# Patient Record
Sex: MALE | Race: BLACK OR AFRICAN AMERICAN | Employment: OTHER | URBAN - METROPOLITAN AREA
[De-identification: names, ages, dates, MRNs, and addresses within clinical notes are randomized per-mention and may not be internally consistent; named-entity substitution may affect disease eponyms.]

---

## 2017-01-03 ENCOUNTER — ANESTHESIA EVENT (OUTPATIENT)
Dept: MEDSURG UNIT | Age: 77
End: 2017-01-03
Payer: MEDICARE

## 2017-01-03 ENCOUNTER — APPOINTMENT (OUTPATIENT)
Dept: CT IMAGING | Age: 77
End: 2017-01-03
Attending: INTERNAL MEDICINE
Payer: MEDICARE

## 2017-01-03 ENCOUNTER — ANESTHESIA (OUTPATIENT)
Dept: MEDSURG UNIT | Age: 77
End: 2017-01-03
Payer: MEDICARE

## 2017-01-03 ENCOUNTER — HOSPITAL ENCOUNTER (OUTPATIENT)
Dept: NON INVASIVE DIAGNOSTICS | Age: 77
Discharge: HOME | End: 2017-01-04
Attending: INTERNAL MEDICINE | Admitting: INTERNAL MEDICINE
Payer: MEDICARE

## 2017-01-03 PROBLEM — E11.9 DIABETES (HCC): Status: ACTIVE | Noted: 2017-01-03

## 2017-01-03 PROBLEM — I25.10 ASCVD (ARTERIOSCLEROTIC CARDIOVASCULAR DISEASE): Status: ACTIVE | Noted: 2017-01-03

## 2017-01-03 PROBLEM — R55 NEAR SYNCOPE: Status: ACTIVE | Noted: 2017-01-03

## 2017-01-03 PROBLEM — E78.5 HYPERLIPIDEMIA: Status: ACTIVE | Noted: 2017-01-03

## 2017-01-03 PROBLEM — I10 HYPERTENSION: Status: ACTIVE | Noted: 2017-01-03

## 2017-01-03 PROBLEM — M19.90 DJD (DEGENERATIVE JOINT DISEASE): Status: ACTIVE | Noted: 2017-01-03

## 2017-01-03 PROBLEM — I42.9 CARDIOMYOPATHY (HCC): Status: ACTIVE | Noted: 2017-01-03

## 2017-01-03 LAB
GLUCOSE BLD STRIP.AUTO-MCNC: 119 MG/DL (ref 65–100)
GLUCOSE BLD STRIP.AUTO-MCNC: 120 MG/DL (ref 65–100)
GLUCOSE BLD STRIP.AUTO-MCNC: 124 MG/DL (ref 65–100)
GLUCOSE BLD STRIP.AUTO-MCNC: 153 MG/DL (ref 65–100)
GLUCOSE BLD STRIP.AUTO-MCNC: 65 MG/DL (ref 65–100)
GLUCOSE BLD STRIP.AUTO-MCNC: 76 MG/DL (ref 65–100)
GLUCOSE BLD STRIP.AUTO-MCNC: 78 MG/DL (ref 65–100)
SERVICE CMNT-IMP: ABNORMAL
SERVICE CMNT-IMP: NORMAL

## 2017-01-03 PROCEDURE — 77030010507 HC ADH SKN DERMBND J&J -B

## 2017-01-03 PROCEDURE — 76060000032 HC ANESTHESIA 0.5 TO 1 HR

## 2017-01-03 PROCEDURE — 74011250636 HC RX REV CODE- 250/636: Performed by: INTERNAL MEDICINE

## 2017-01-03 PROCEDURE — 77030031139 HC SUT VCRL2 J&J -A

## 2017-01-03 PROCEDURE — 82962 GLUCOSE BLOOD TEST: CPT

## 2017-01-03 PROCEDURE — 77030011640 HC PAD GRND REM COVD -A

## 2017-01-03 PROCEDURE — 77030002996 HC SUT SLK J&J -A

## 2017-01-03 PROCEDURE — 33264 RMVL & RPLCMT DFB GEN MLT LD: CPT

## 2017-01-03 PROCEDURE — 74011250636 HC RX REV CODE- 250/636

## 2017-01-03 PROCEDURE — 77030037029 HC IMPL ENV ICD ANTIBACT ABSRB TYRX MEDT -G

## 2017-01-03 PROCEDURE — 74011250637 HC RX REV CODE- 250/637

## 2017-01-03 PROCEDURE — 70450 CT HEAD/BRAIN W/O DYE: CPT

## 2017-01-03 PROCEDURE — L3670 SO ACRO/CLAV CAN WEB PRE OTS: HCPCS

## 2017-01-03 PROCEDURE — 74011000250 HC RX REV CODE- 250

## 2017-01-03 PROCEDURE — 74011000250 HC RX REV CODE- 250: Performed by: INTERNAL MEDICINE

## 2017-01-03 PROCEDURE — 74011000258 HC RX REV CODE- 258: Performed by: INTERNAL MEDICINE

## 2017-01-03 PROCEDURE — 77030018729 HC ELECTRD DEFIB PAD CARD -B

## 2017-01-03 PROCEDURE — 77010033678 HC OXYGEN DAILY

## 2017-01-03 PROCEDURE — C1882 AICD, OTHER THAN SING/DUAL: HCPCS

## 2017-01-03 PROCEDURE — 77030028698 HC BLD TISS PLSM MEDT -D

## 2017-01-03 PROCEDURE — 77030018836 HC SOL IRR NACL ICUM -A

## 2017-01-03 RX ORDER — DEXTROSE 50 % IN WATER (D50W) INTRAVENOUS SYRINGE
Status: DISPENSED
Start: 2017-01-03 | End: 2017-01-03

## 2017-01-03 RX ORDER — ONDANSETRON 2 MG/ML
4 INJECTION INTRAMUSCULAR; INTRAVENOUS
Status: DISCONTINUED | OUTPATIENT
Start: 2017-01-03 | End: 2017-01-04 | Stop reason: HOSPADM

## 2017-01-03 RX ORDER — FENTANYL CITRATE 50 UG/ML
12.5-5 INJECTION, SOLUTION INTRAMUSCULAR; INTRAVENOUS
Status: DISCONTINUED | OUTPATIENT
Start: 2017-01-03 | End: 2017-01-03 | Stop reason: ALTCHOICE

## 2017-01-03 RX ORDER — GUAIFENESIN 100 MG/5ML
81 LIQUID (ML) ORAL DAILY
Status: DISCONTINUED | OUTPATIENT
Start: 2017-01-04 | End: 2017-01-04 | Stop reason: HOSPADM

## 2017-01-03 RX ORDER — PROPOFOL 10 MG/ML
INJECTION, EMULSION INTRAVENOUS AS NEEDED
Status: DISCONTINUED | OUTPATIENT
Start: 2017-01-03 | End: 2017-01-03 | Stop reason: HOSPADM

## 2017-01-03 RX ORDER — DEXTROSE 50 % IN WATER (D50W) INTRAVENOUS SYRINGE
25 AS NEEDED
Status: DISCONTINUED | OUTPATIENT
Start: 2017-01-03 | End: 2017-01-04 | Stop reason: HOSPADM

## 2017-01-03 RX ORDER — BACITRACIN 50000 [IU]/1
INJECTION, POWDER, FOR SOLUTION INTRAMUSCULAR
Status: COMPLETED
Start: 2017-01-03 | End: 2017-01-03

## 2017-01-03 RX ORDER — HEPARIN SODIUM 200 [USP'U]/100ML
500 INJECTION, SOLUTION INTRAVENOUS ONCE
Status: COMPLETED | OUTPATIENT
Start: 2017-01-03 | End: 2017-01-03

## 2017-01-03 RX ORDER — BACITRACIN 50000 [IU]/1
50000 INJECTION, POWDER, FOR SOLUTION INTRAMUSCULAR ONCE
Status: COMPLETED | OUTPATIENT
Start: 2017-01-03 | End: 2017-01-03

## 2017-01-03 RX ORDER — PROPOFOL 10 MG/ML
INJECTION, EMULSION INTRAVENOUS
Status: DISCONTINUED | OUTPATIENT
Start: 2017-01-03 | End: 2017-01-03 | Stop reason: HOSPADM

## 2017-01-03 RX ORDER — SODIUM CHLORIDE 0.9 % (FLUSH) 0.9 %
5-10 SYRINGE (ML) INJECTION AS NEEDED
Status: CANCELLED | OUTPATIENT
Start: 2017-01-03

## 2017-01-03 RX ORDER — MAGNESIUM SULFATE 100 %
4 CRYSTALS MISCELLANEOUS AS NEEDED
Status: DISCONTINUED | OUTPATIENT
Start: 2017-01-03 | End: 2017-01-04 | Stop reason: HOSPADM

## 2017-01-03 RX ORDER — ACETAMINOPHEN 325 MG/1
650 TABLET ORAL
Status: DISCONTINUED | OUTPATIENT
Start: 2017-01-03 | End: 2017-01-04 | Stop reason: HOSPADM

## 2017-01-03 RX ORDER — CEFAZOLIN SODIUM 1 G/3ML
INJECTION, POWDER, FOR SOLUTION INTRAMUSCULAR; INTRAVENOUS
Status: DISCONTINUED
Start: 2017-01-03 | End: 2017-01-03 | Stop reason: ALTCHOICE

## 2017-01-03 RX ORDER — INSULIN LISPRO 100 [IU]/ML
INJECTION, SOLUTION INTRAVENOUS; SUBCUTANEOUS
Status: DISCONTINUED | OUTPATIENT
Start: 2017-01-03 | End: 2017-01-04 | Stop reason: HOSPADM

## 2017-01-03 RX ORDER — LIDOCAINE HYDROCHLORIDE AND EPINEPHRINE 10; 10 MG/ML; UG/ML
1-20 INJECTION, SOLUTION INFILTRATION; PERINEURAL
Status: DISCONTINUED | OUTPATIENT
Start: 2017-01-03 | End: 2017-01-03 | Stop reason: ALTCHOICE

## 2017-01-03 RX ORDER — SODIUM CHLORIDE 0.9 % (FLUSH) 0.9 %
5-10 SYRINGE (ML) INJECTION EVERY 8 HOURS
Status: CANCELLED | OUTPATIENT
Start: 2017-01-03

## 2017-01-03 RX ORDER — SODIUM CHLORIDE 9 MG/ML
100 INJECTION, SOLUTION INTRAVENOUS CONTINUOUS
Status: DISCONTINUED | OUTPATIENT
Start: 2017-01-03 | End: 2017-01-04 | Stop reason: HOSPADM

## 2017-01-03 RX ORDER — DEXTROSE 50 % IN WATER (D50W) INTRAVENOUS SYRINGE
12.5-25 AS NEEDED
Status: DISCONTINUED | OUTPATIENT
Start: 2017-01-03 | End: 2017-01-04 | Stop reason: HOSPADM

## 2017-01-03 RX ORDER — MIDAZOLAM HYDROCHLORIDE 1 MG/ML
1-5 INJECTION, SOLUTION INTRAMUSCULAR; INTRAVENOUS
Status: DISCONTINUED | OUTPATIENT
Start: 2017-01-03 | End: 2017-01-03 | Stop reason: ALTCHOICE

## 2017-01-03 RX ORDER — ACETAMINOPHEN 325 MG/1
650 TABLET ORAL
Status: CANCELLED | OUTPATIENT
Start: 2017-01-03

## 2017-01-03 RX ORDER — GUAIFENESIN 100 MG/5ML
LIQUID (ML) ORAL
Status: COMPLETED
Start: 2017-01-03 | End: 2017-01-03

## 2017-01-03 RX ORDER — CEPHALEXIN 500 MG/1
500 CAPSULE ORAL 3 TIMES DAILY
Qty: 15 CAP | Refills: 0 | Status: SHIPPED | OUTPATIENT
Start: 2017-01-03 | End: 2017-01-08

## 2017-01-03 RX ADMIN — PROPOFOL 50 MCG/KG/MIN: 10 INJECTION, EMULSION INTRAVENOUS at 11:56

## 2017-01-03 RX ADMIN — DEXTROSE MONOHYDRATE 12.5 G: 25 INJECTION, SOLUTION INTRAVENOUS at 10:09

## 2017-01-03 RX ADMIN — ASPIRIN 81 MG 81 MG: 81 TABLET ORAL at 17:47

## 2017-01-03 RX ADMIN — HEPARIN SODIUM 1000 UNITS: 200 INJECTION, SOLUTION INTRAVENOUS at 12:14

## 2017-01-03 RX ADMIN — PROPOFOL 30 MG: 10 INJECTION, EMULSION INTRAVENOUS at 12:34

## 2017-01-03 RX ADMIN — CEFAZOLIN SODIUM 2 G: 1 INJECTION, POWDER, FOR SOLUTION INTRAMUSCULAR; INTRAVENOUS at 12:12

## 2017-01-03 RX ADMIN — BACITRACIN 50000 UNITS: 5000 INJECTION, POWDER, FOR SOLUTION INTRAMUSCULAR at 12:35

## 2017-01-03 RX ADMIN — SODIUM CHLORIDE 250 ML: 900 INJECTION, SOLUTION INTRAVENOUS at 17:50

## 2017-01-03 RX ADMIN — BACITRACIN 50000 UNITS: 50000 INJECTION, POWDER, FOR SOLUTION INTRAMUSCULAR at 12:35

## 2017-01-03 RX ADMIN — SODIUM CHLORIDE 100 ML/HR: 900 INJECTION, SOLUTION INTRAVENOUS at 10:10

## 2017-01-03 RX ADMIN — LIDOCAINE HYDROCHLORIDE AND EPINEPHRINE 20 ML: 10; 10 INJECTION, SOLUTION INFILTRATION; PERINEURAL at 12:33

## 2017-01-03 RX ADMIN — PROPOFOL 20 MG: 10 INJECTION, EMULSION INTRAVENOUS at 12:28

## 2017-01-03 NOTE — PROGRESS NOTES
Spiritual Care Assessment/Progress Notes    Roman Peña [id number]  [id number]    1940  67 y.o.  male    Patient Telephone Number: [phone] (home)   Mormon Affiliation: [facility]   Language: English   Extended Emergency Contact Information  Primary Emergency Contact: Georgina Rodriguez  Address: [address], [address] Phone: [phone]  Mobile Phone: [phone]  Relation: Spouse   There are no active problems to display for this patient. Date: 1/3/2017       Level of Mormon/Spiritual Activity:  []         Involved in tammy tradition/spiritual practice    []         Not involved in tammy tradition/spiritual practice  []         Spiritually oriented    []         Claims no spiritual orientation    []         seeking spiritual identity  []         Feels alienated from Sikhism practice/tradition  []         Feels angry about Sikhism practice/tradition  []         Spirituality/Sikhism tradition a resource for coping at this time.   [x]         Not able to assess due to medical condition    Services Provided Today:  []         crisis intervention    []         reading Scriptures  []         spiritual assessment    []         prayer  []         empathic listening/emotional support  []         rites and rituals (cite in comments)  []         life review     []         Sikhism support  []         theological development   []         advocacy  []         ethical dialog     []         blessing  []         bereavement support    []         support to family  []         anticipatory grief support   []         help with AMD  []         spiritual guidance    []         meditation      Spiritual Care Needs  [x]         Emotional Support  []         Spiritual/Mormon Care  []         Loss/Adjustment  []         Advocacy/Referral                /Ethics  []         No needs expressed at               this time  []         Other: (note in comments)  Spiritual Care Plan  []         Follow up visits with               pt/family  []         Provide materials  []         Schedule sacraments  []         Contact Community               Clergy  [x]         Follow up as needed  []         Other: (note in               comments)     Comments:  Provided emotional support to spouse and family during and after code. Family was self supporting when  left unit. Marlo Lindsey M.S., M.Div.   [address] ([id number])

## 2017-01-03 NOTE — PROGRESS NOTES
Responding to RAT call Code S secondary to staff reports preparing for discharge when pt. C/o nausea with drooling. Upon arrival pt. Awake and oriented. Answers questions appropriately and following commands some slight drift of left lower extremity. Dr. Malick Goff at bedside. Dr. Valente Merino notified by staff via phone (See MD orders). Very slight dysarthria noted. FSBS 124. Tele-Neuro not initiated at this time per Dr. Malick Goff. NIH 3. Pt. deines headache or discomfort, but reports similar episode in past.  CT pending. Josey 37 CT results noted.

## 2017-01-03 NOTE — PROCEDURES
[address]  [phone]    Patient ID:  Patient: Morgan Hill  MRN: [id number]  Age: 67 y.o.  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 He is pacemaker-dependent and it would be catastrophic if his battery were to deplete unexpectedly. Procedures Performed:  1. Replace/Upgrade Existing ICD pulse generator (03634)  2. ICD testing, initial (05590-44)    The patient was brought to the EP lab in a postabsorptive state after informed consent had been previously obtained. Continuous electrocardiographic and hemodynamic monitoring was performed. Sedation was performed by the anesthesiologist who was in constant attendance throughout the procedure and by the anesthesiologist during ICD testing. Using 1% lidocaine with epinephrine, the left chest site was anesthetized. Using blunt dissection and electrocautery, the pocket was dissected and ultimately the old BIV-ICD generator delivered from the pocket. After the leads were disconnected from the generator, they were tested and performance verified. The new pulse generator was connected to the leads and placed in the pocket after hemostasis was confirmed. A Tyrx absorbable antibiotic envelope was used. Vigorous irrigation with antibiotic solution was performed. The pocket was closed using a running 2-0 Vicryl layer x1, followed by a more superficial layer of running 4-0 Vicryl in a subcuticular fashion to close the skin. Dermabond was applied. Defibrillation testing was performed before pocket closure.   Using an induction protocol, ventricular fibrillation was successfully sensed and a single 20J biphasic shock restored sinus rhythm, 63 ohm shock impedance. No complications. Preoperative Diagnosis: As above. Postoperative Diagnosis: As above. Procedure:  As above. Surgeon(s) and Role:  Hector Hernandez MD - Primary   Anesthesia:   MAC by the anesthesiologist  Estimated Blood Loss:  <5 cc. Specimens: * No specimens in log *   Findings:  As below. Complications:  None. Case time:  18 minutes    SETTINGS for new generator:  SJM 3369-40Q, [id number]  RA 1.6, 1.0, 440  RV -, 0.625, 490  LV -, 1.125, 680  DDD     Explanted device:  SJM 3365-40Q, [id number], implanted 10/6/2015    Recommendations:  After successful BIV-ICD generator change at the left chest, routine follow-up in 2-4 weeks for wound and device check.

## 2017-01-03 NOTE — ANESTHESIA POSTPROCEDURE EVALUATION
Post-Anesthesia Evaluation and Assessment    Patient: Roman Peña MRN: [id number]  SSN: [id number]    YOB: 1940  Age: 67 y.o. Sex: male       Cardiovascular Function/Vital Signs  Visit Vitals    /65    Pulse 60    Temp 36.2 °C (97.1 °F)    Resp 18    Ht 4' 11.5\" (1.511 m)    Wt 52.2 kg (115 lb)    SpO2 98%    BMI 22.84 kg/m2       Patient is status post general anesthesia for * No procedures listed *. Nausea/Vomiting: None    Postoperative hydration reviewed and adequate. Pain:  Pain Scale 1: Numeric (0 - 10) (01/03/17 1306)  Pain Intensity 1: 0 (01/03/17 1306)   Managed    Neurological Status: At baseline    Mental Status and Level of Consciousness: Arousable    Pulmonary Status:   O2 Device: Nasal cannula (01/03/17 1306)   Adequate oxygenation and airway patent    Complications related to anesthesia: None    Post-anesthesia assessment completed.  No concerns    Signed By: Ron Mac MD     January 3, 2017

## 2017-01-03 NOTE — ANESTHESIA POSTPROCEDURE EVALUATION
Post-Anesthesia Evaluation and Assessment    Patient: Contreras Paz MRN: [id number]  SSN: [id number]    YOB: 1940  Age: 67 y.o. Sex: male       Cardiovascular Function/Vital Signs  Visit Vitals    BP (!) 108/91    Pulse 60    Temp 36.2 °C (97.1 °F)    Resp 14    Ht 4' 11.5\" (1.511 m)    Wt 52.2 kg (115 lb)    SpO2 100%    BMI 22.84 kg/m2       Patient is status post general anesthesia for * No procedures listed *. Nausea/Vomiting: None    Postoperative hydration reviewed and adequate. Pain:  Pain Scale 1: Numeric (0 - 10) (01/03/17 1306)  Pain Intensity 1: 0 (01/03/17 1306)   Managed    Neurological Status: At baseline    Mental Status and Level of Consciousness: Arousable    Pulmonary Status:   O2 Device: Nasal cannula (01/03/17 1306)   Adequate oxygenation and airway patent    Complications related to anesthesia: None    Post-anesthesia assessment completed.  No concerns    Signed By: Olivia Hernandez MD     January 3, 2017

## 2017-01-03 NOTE — ANESTHESIA PREPROCEDURE EVALUATION
Anesthetic History   No history of anesthetic complications            Review of Systems / Medical History  Patient summary reviewed, nursing notes reviewed and pertinent labs reviewed    Pulmonary  Within defined limits                 Neuro/Psych   Within defined limits           Cardiovascular    Hypertension        Dysrhythmias   Pacemaker, CAD and CABG    Exercise tolerance: >4 METS     GI/Hepatic/Renal     GERD           Endo/Other    Diabetes    Arthritis     Other Findings        Anesthetic History   No history of anesthetic complications            Review of Systems / Medical History  Patient summary reviewed, nursing notes reviewed and pertinent labs reviewed    Pulmonary  Within defined limits                 Neuro/Psych   Within defined limits           Cardiovascular    Hypertension      CHF  Dysrhythmias   Pacemaker, CAD, CABG (CABG in 1999) and hyperlipidemia    Exercise tolerance: >4 METS     GI/Hepatic/Renal     GERD           Endo/Other    Diabetes    Arthritis     Other Findings              Physical Exam    Airway  Mallampati: II  TM Distance: 4 - 6 cm  Neck ROM: normal range of motion   Mouth opening: Normal     Cardiovascular  Regular rate and rhythm,  S1 and S2 normal,  no murmur, click, rub, or gallop             Dental    Dentition: Lower partial plate     Pulmonary  Breath sounds clear to auscultation               Abdominal  GI exam deferred       Other Findings            Anesthetic Plan    ASA: 3  Anesthesia type: total IV anesthesia and general          Induction: Intravenous  Anesthetic plan and risks discussed with: Patient                Physical Exam    Airway  Mallampati: II  TM Distance: 4 - 6 cm  Neck ROM: normal range of motion   Mouth opening: Normal     Cardiovascular  Regular rate and rhythm,  S1 and S2 normal,  no murmur, click, rub, or gallop             Dental    Dentition: Lower partial plate     Pulmonary  Breath sounds clear to auscultation               Abdominal  GI exam deferred       Other Findings            Anesthetic Plan    ASA: 3  Anesthesia type: general    Monitoring Plan: BIS      Induction: Intravenous  Anesthetic plan and risks discussed with: Patient

## 2017-01-03 NOTE — DISCHARGE INSTRUCTIONS
DISCHARGE INSTRUCTIONS FOR PATIENTS WITH ICD'S    You had a prophylactic [facility] BIV-ICD generator change 1/2/2016 due to a medical advisory for battery. 1. Remember to call for an appointment in 2-4 weeks [phone] to check healing and implant programming with Dr. Jose Brooks nurse, Mikel Trujillo. 2. Medic Alert Bracelets are available from your pharmacist to wear at all times if you choose to wear one. 3. Carry your ID card for your ICD with you at all times. This card will be given to you in the hospital or mailed to you. 4. The ICD will bulge slightly under your skin. The bulge will decrease in size over the next few weeks. Please notify the doctor's office if you notice any of the following around your ICD site:   A.  A bruise that does not go away. B.  Soreness or yellow, green, or brown drainage from the site. C. Any swelling from the site. D. If you have a fever of 100 degrees or higher that lasts for a few days. INCISION CARE       1.  Leave the skin glue over your site until it dissolves on its own, usually in a few weeks. 2.  You may shower after 3 days as long as your incision isnt submerged or directly sprayed upon until well healed. 3.  For comfort, wear loose fitting clothing. 4.  Report any signs of infection, fever, pain, swelling, redness, oozing, or heat at site especially if these symptoms increase after the first 3 to 4 days. ACTIVITY PRECAUTIONS     1. Avoid rough contact with the implant site. 2. No driving for 5 days. 3. Avoid lifting your arm over your head, carrying anything on the affected side, or lifting over 10 pounds for 30 days. For the first 2 days only bend your arm at the elbow. 4. Any extreme activity such as golf, weight lifting or exercise biking should be restricted for 60 days. 5. Do not carry objects by holding them against your implant site. 6.  No shooting rifles or any type of gun with the affected shoulder permanently.   7.  Welding and chainsaws are prohibited. SPECIAL PRECAUTIONS     1. You should avoid all strong magnetic fields, such as arc welding, large transformers, large motors. Some ICD devices will beep if it detects a strong magnet. If this occurs, move out of the area. 2.  You may not have an MRI which uses a strong magnet to take pictures. 3.  Treatments or surgery that requires diathermy or electrocautery should be discussed with your doctor before scheduled. 4.  Avoid radio frequency transmitters, including radar. 5.  Advise dentist or other medical personnel you see that you have an ICD. 6.  Cell phones and microwave oven use is okay. 7.  If you plan to move or take a trip to a new area, the doctor's office will give you a name of a doctor to contact for any problems. SPECIAL INSTRUCTIONS ON SHOCKS     1. Notify your doctor for any of the following:       A. Anytime a shock is received in a 24 hour period. An office visit is not usually required for a single shock. B.  Two or more shocks in a row. If you do not feel well, call the [facility], otherwise call your doctor. This may require an office visit. C. Two or more shocks spaced apart by several hours. This may require an office visit. 2.  Keep a record of events. Include date, time, symptoms and activity at that time. ANTIBIOTIC THERAPY    During the first 8 weeks after your ICD insertion, you may need antibiotics before any dental work or certain tests or operations. Let the dentist or doctor who is caring for you know that you have had an implanted device. You will receive a prescription for a prophylactic antibiotic called cephalexin for a few days after your implant. This was called in to your pharmacy.

## 2017-01-03 NOTE — PROGRESS NOTES
Patient ambulated in hallway without difficulty. Dressing CDI. No complaints. Discharge instructions reviewed with patient; to be discharged to home with wife. Site care instructions reviewed; site(s) CDI. Patient instructed on which medications to continue and  which to start. Prescriptions sent to patient's pharmacy. Medication info provided and reviewed with patient. Follow-up appointment information given. IV and tele box removed. Opportunity for questions provided; all questions answered. All belongings returned. Patient wheeled to front door via wheelchair by nurse; to be transported home by wife.

## 2017-01-03 NOTE — PROGRESS NOTES
1737: Pt. Started feeling nauseous, feeling lethargic, and drooling, with slight left sided facial droop, and general decrease in LOC from baseline.  in hands, and strength in feet and legs are equal. Can follow pen light in all directions. Vitals stable. Pt. Was ready for discharge with tele and IV removed. Upon seeing these symptoms tele and IV access where obtained again. Aspirin 81mg and 250 NS bolus given. Head CT ordered and transported down to CT with nurse and lifepack. 1845: returned from [address]. Vitals stable. Pt. Level of consciousness back to baseline       Bedside and Verbal shift change report given to Stefanie Coronado (oncoming nurse) by Demetrius Redmond (offgoing nurse). Report included the following information SBAR, Kardex, Intake/Output, MAR, Accordion and Recent Results.

## 2017-01-03 NOTE — PROGRESS NOTES
Hospitalist Consultation Note    NAME:  Belinda Vallejo   :   1940   MRN:   [id number]     ATTENDING: Albino Kendrick MD  PCP:  Walker Pollard MD    Date/Time:  1/3/2017 5:56 PM      Recommendations/Plan:     Responded to Code S  CVA vs TIA vs Syncope: BP in the low side, no gross focal deficits at this time, will give a small bolus of IVF,  Give ASA, check CT head, get Neurology evaluation on AM.  Hypoglycemia: noted on AM labs, will place on SSI to have hypoglycemia protocol available. Check HbA1C.  HTN: BP is in the low side, monitor. CHF: EF 20%, not in acute failure at this time, monitor. Cardiology in the case. CAD: no chest pain at this time. S/p AICD generator change: under Cardiology care. Subjective:   REQUESTING PHYSICIAN:  REASON FOR CONSULT:      Geovani Grant is a 67 y.o.  male who I was asked to see for Code S. Patient with PMH of CHF, HTN, CAD, admitted for ICD generator change. When patient was ready for D/C patient had episode of drooling and confusion, that recovered in a couple of minutes. At this time patient denies chest pain, no SOB, no N/V no diarrhea, no fever, no urinary symptoms, no other associated symptoms.             Past Medical History   Diagnosis Date    Arrhythmia     Arthritis     CAD (coronary artery disease)     Diabetes ([address]      diet controlled    GERD (gastroesophageal reflux disease)     Hypertension     Kidney stone       Past Surgical History   Procedure Laterality Date    Hx other surgical       artificial testicle    Pr colonoscopy flx dx w/collj spec when pfrmd  2011          Pr cardiac surg procedure unlist       cabg x4 vessels     Hx heart catheterization      Hx pacemaker  10/6/15    Hx gi       COLONOSCOPY    Colorectal scrn; hi risk ind  2016          Colonoscopy N/A 2016     COLONOSCOPY performed by [facility]ailin Lara [facility] at [facility] ENDOSCOPY    Upper gi endoscopy,biopsy 12/14/2016           Social History   Substance Use Topics    Smoking status: Never Smoker    Smokeless tobacco: Never Used    Alcohol use No      Family History   Problem Relation Age of Onset    Heart Disease Mother     No Known Problems Father     Cancer Sister     Diabetes Brother     Anesth Problems Neg Hx        Allergies   Allergen Reactions    Caffeine Unknown (comments)     Sweating AND WOOZY AND CAN'T SLEEP    Codeine Other (comments)     Lightheaded, WOOZY AND CAN'T SLEEP    Percocet [Oxycodone-Acetaminophen] Nausea and Vomiting      Prior to Admission medications    Medication Sig Start Date End Date Taking? Authorizing Provider   cephALEXin (KEFLEX) 500 mg capsule Take 1 Cap by mouth three (3) times daily for 5 days. 1/3/17 1/8/17 Yes Artemio Meyers MD   ramipril (ALTACE) 5 mg capsule Take 20 mg by mouth daily. Yes Historical Provider   tadalafil (CIALIS) 20 mg tablet Take 20 mg by mouth as needed. Yes Historical Provider   OTHER Take 7 Tabs by mouth daily. [address]   Yes Historical Provider   metoprolol succinate (TOPROL XL) 25 mg XL tablet Take 1 Tab by mouth daily. 10/6/15  Yes Artemio Meyers MD   aspirin 81 mg tablet Take 81 mg by mouth daily. 4/7/11  Yes Historical Provider   albuterol sulfate 90 mcg/actuation aepb Take 2 Puffs by inhalation four (4) times daily as needed for Cough (SOB, wheezing). 3/10/16   Yudy Gomez NP       REVIEW OF SYSTEMS:     Total of 12 systems reviewed as follows:   I am not able to complete the review of systems because:    The patient is intubated and sedated    The patient has altered mental status due to his acute medical problems    The patient has baseline aphasia from prior stroke(s)    The patient has baseline dementia and is not reliable historian                 POSITIVE= underlined text  Negative = text not underlined  General:  fever, chills, sweats, generalized weakness, weight loss/gain,      loss of appetite Eyes:    blurred vision, eye pain, loss of vision, double vision  ENT:    rhinorrhea, pharyngitis   Respiratory:   cough, sputum production, SOB, wheezing, GUERIN, pleuritic pain   Cardiology:   chest pain, palpitations, orthopnea, PND, edema, syncope   Gastrointestinal:  abdominal pain , N/V, dysphagia, diarrhea, constipation, bleeding   Genitourinary:  frequency, urgency, dysuria, hematuria, incontinence   Muskuloskeletal :  arthralgia, myalgia   Hematology:  easy bruising, nose or gum bleeding, lymphadenopathy   Dermatological: rash, ulceration, pruritis   Endocrine:   hot flashes or polydipsia   Neurological:  headache, dizziness, confusion, focal weakness, paresthesia,     Speech difficulties, memory loss, gait disturbance  Psychological: Feelings of anxiety, depression, agitation    Objective:   VITALS:    Visit Vitals    /72    Pulse 64    Temp 97.1 °F (36.2 °C)    Resp 16    Ht 4' 11.5\" (1.511 m)    Wt 52.2 kg (115 lb)    SpO2 94%    BMI 22.84 kg/m2     Temp (24hrs), Av.3 °F (36.3 °C), Min:97.1 °F (36.2 °C), Max:97.5 °F (36.4 °C)      PHYSICAL EXAM:   General:    Alert, cooperative, no distress, appears stated age. HEENT: Atraumatic, anicteric sclerae, pink conjunctivae     No oral ulcers, mucosa moist, throat clear  Neck:  Supple, symmetrical,  thyroid: non tender  Lungs:   Coarse BS  Chest wall:  No tenderness  No Accessory muscle use. Heart:   Regular  rhythm,  No  murmur   No edema  Abdomen:   Soft, non-tender. Not distended. Bowel sounds normal  Extremities: No cyanosis. No clubbing  Skin:     Not pale. Not Jaundiced  No rashes   Psych:  Good insight. Not depressed. Not anxious or agitated.   Neurologic: Awake, oriented x3, GCS 15, no gross focal deficits    _______________________________________________________________________  Care Plan discussed with:    Comments   Patient y    Family  y    RN y    Care Manager                    Consultant: ____________________________________________________________________  TOTAL TIME:    mins    Comments     Reviewed previous records   >50% of visit spent in counseling and coordination of care y Discussion with patient and/or family and questions answered       Critical Care Provided 39   Minutes non procedure based  ________________________________________________________________________  Signed: Kira Dasilva MD      Procedures: see electronic medical records for all procedures/Xrays and details which were not copied into this note but were reviewed prior to creation of Plan.     LAB DATA REVIEWED:    Recent Results (from the past 24 hour(s))   GLUCOSE, POC    Collection Time: 01/03/17 10:03 AM   Result Value Ref Range    Glucose (POC) 78 65 - 100 mg/dL    Performed by Ebluis Ivonne    GLUCOSE, POC    Collection Time: 01/03/17 10:23 AM   Result Value Ref Range    Glucose (POC) 153 (H) 65 - 100 mg/dL    Performed by Herbert Parkers    GLUCOSE, POC    Collection Time: 01/03/17  1:20 PM   Result Value Ref Range    Glucose (POC) 65 65 - 100 mg/dL    Performed by [address], POC    Collection Time: 01/03/17  1:46 PM   Result Value Ref Range    Glucose (POC) 76 65 - 100 mg/dL    Performed by [address], POC    Collection Time: 01/03/17  2:05 PM   Result Value Ref Range    Glucose (POC) 119 (H) 65 - 100 mg/dL    Performed by Gale Rosario, POC    Collection Time: 01/03/17  5:31 PM   Result Value Ref Range    Glucose (POC) 124 (H) 65 - 100 mg/dL    Performed by Ruma Flynn        _____________________________  Hospitalist: Kira Dasilva MD

## 2017-01-03 NOTE — IP AVS SNAPSHOT
[address] [facility] 
[phone] Patient: Patrick Vega MRN: [id number] CPV:7/5/0445 You are allergic to the following Allergen Reactions Caffeine Unknown (comments) Sweating AND WOOZY AND CAN'T SLEEP Codeine Other (comments) Lightheaded, WOOZY AND CAN'T SLEEP Percocet (Oxycodone-Acetaminophen) Nausea and Vomiting Recent Documentation Height Weight BMI Smoking Status 1.511 m 52.2 kg 22.84 kg/m2 Never Smoker Emergency Contacts Name Discharge Info Relation Home Work Mobile Georgina Rodriguez DISCHARGE CAREGIVER [3] Spouse [3] [phone] About your hospitalization You were admitted on:  January 3, 2017 You last received care in the:  [facility] You were discharged on:  January 3, 2017 Unit phone number:  [phone] Why you were hospitalized Your primary diagnosis was:  Not on File Providers Seen During Your Hospitalizations Provider Role Specialty Primary office phone Duong Javier MD Attending Provider Cardiology [phone] Your Primary Care Physician (PCP) Primary Care Physician Office Phone Office Fax Sonia Dawson [phone] Follow-up Information Follow up With Details Comments Contact Info Doung Javier MD Schedule an appointment as soon as possible for a visit in 1 month with Dr. Jessica Hwang nurse, Alon Gillespie [address] [facility] 
[phone] Current Discharge Medication List  
  
START taking these medications Dose & Instructions Dispensing Information Comments Morning Noon Evening Bedtime  
 cephALEXin 500 mg capsule Commonly known as:  Merdis Perone Your next dose is: Today, Tomorrow Other:  _________  Dose:  500 mg  
 Take 1 Cap by mouth three (3) times daily for 5 days. Quantity:  15 Cap Refills:  0 CONTINUE these medications which have NOT CHANGED Dose & Instructions Dispensing Information Comments Morning Noon Evening Bedtime  
 albuterol sulfate 90 mcg/actuation Aepb Your next dose is: Today, Tomorrow Other:  _________ Dose:  2 Puff Take 2 Puffs by inhalation four (4) times daily as needed for Cough (SOB, wheezing). Quantity:  1 Inhaler Refills:  0  
     
   
   
   
  
 aspirin 81 mg tablet Your next dose is: Today, Tomorrow Other:  _________ Dose:  81 mg Take 81 mg by mouth daily. Refills:  0 CIALIS 20 mg tablet Generic drug:  tadalafil Your next dose is: Today, Tomorrow Other:  _________ Dose:  20 mg Take 20 mg by mouth as needed. Refills:  0  
     
   
   
   
  
 metoprolol succinate 25 mg XL tablet Commonly known as:  TOPROL XL Your next dose is: Today, Tomorrow Other:  _________ Dose:  25 mg Take 1 Tab by mouth daily. Quantity:  30 Tab Refills:  11  
     
   
   
   
  
 OTHER Your next dose is: Today, Tomorrow Other:  _________ Dose:  7 Tab Take 7 Tabs by mouth daily. [address] Refills:  0  
     
   
   
   
  
 ramipril 5 mg capsule Commonly known as:  ALTACE Your next dose is: Today, Tomorrow Other:  _________ Dose:  20 mg Take 20 mg by mouth daily. Refills:  0 Where to Get Your Medications Information on where to get these meds will be given to you by the nurse or doctor. ! Ask your nurse or doctor about these medications  
  cephALEXin 500 mg capsule Discharge Instructions DISCHARGE INSTRUCTIONS FOR PATIENTS WITH ICD'S 
 
 You had a prophylactic [employer] BIV-ICD generator change 1/2/2016 due to a medical advisory for battery. 1. Remember to call for an appointment in 2-4 weeks [phone] to check healing and implant programming with Dr. Bárbara Mills nurse, Ananya Underwood. 2. Medic Alert Bracelets are available from your pharmacist to wear at all times if you choose to wear one. 3. Carry your ID card for your ICD with you at all times. This card will be given to you in the hospital or mailed to you. 4. The ICD will bulge slightly under your skin. The bulge will decrease in size over the next few weeks. Please notify the doctor's office if you notice any of the following around your ICD site: A.  A bruise that does not go away. B.  Soreness or yellow, green, or brown drainage from the site. C. Any swelling from the site. D. If you have a fever of 100 degrees or higher that lasts for a few days. INCISION CARE 1.  Leave the skin glue over your site until it dissolves on its own, usually in a few weeks. 2.  You may shower after 3 days as long as your incision isnt submerged or directly sprayed upon until well healed. 3.  For comfort, wear loose fitting clothing. 4.  Report any signs of infection, fever, pain, swelling, redness, oozing, or heat at site especially if these symptoms increase after the first 3 to 4 days. ACTIVITY PRECAUTIONS 1. Avoid rough contact with the implant site. 2. No driving for 5 days. 3. Avoid lifting your arm over your head, carrying anything on the affected side, or lifting over 10 pounds for 30 days. For the first 2 days only bend your arm at the elbow. 4. Any extreme activity such as golf, weight lifting or exercise biking should be restricted for 60 days. 5. Do not carry objects by holding them against your implant site. 6.  No shooting rifles or any type of gun with the affected shoulder permanently. 7.  Welding and chainsaws are prohibited. SPECIAL PRECAUTIONS 1. You should avoid all strong magnetic fields, such as arc welding, large transformers, large motors. Some ICD devices will beep if it detects a strong magnet. If this occurs, move out of the area. 2.  You may not have an MRI which uses a strong magnet to take pictures. 3.  Treatments or surgery that requires diathermy or electrocautery should be discussed with your doctor before scheduled. 4.  Avoid radio frequency transmitters, including radar. 5.  Advise dentist or other medical personnel you see that you have an ICD. 6.  Cell phones and microwave oven use is okay. 7.  If you plan to move or take a trip to a new area, the doctor's office will give you a name of a doctor to contact for any problems. SPECIAL INSTRUCTIONS ON SHOCKS 1. Notify your doctor for any of the following: A. Anytime a shock is received in a 24 hour period. An office visit is not usually required for a single shock. B.  Two or more shocks in a row. If you do not feel well, call the [facility], otherwise call your doctor. This may require an office visit. C. Two or more shocks spaced apart by several hours. This may require an office visit. 2.  Keep a record of events. Include date, time, symptoms and activity at that time. ANTIBIOTIC THERAPY During the first 8 weeks after your ICD insertion, you may need antibiotics before any dental work or certain tests or operations. Let the dentist or doctor who is caring for you know that you have had an implanted device. You will receive a prescription for a prophylactic antibiotic called cephalexin for a few days after your implant. Discharge Orders None Introducing [facility]! Jacob Foster introduces [employer] patient portal. Now you can access parts of your medical record, email your doctor's office, and request medication refills online.    
 
1. In your internet browser, go to [email or web address] 2. Click on the First Time User? Click Here link in the Sign In box. You will see the New Member Sign Up page. 3. Enter your [employer] Access Code exactly as it appears below. You will not need to use this code after youve completed the sign-up process. If you do not sign up before the expiration date, you must request a new code. · [employer] Access Code: [email or web address] Expires: 3/7/2017 12:49 PM 
 
4. Enter the last four digits of your Social Security Number (xxxx) and Date of Birth (mm/dd/yyyy) as indicated and click Submit. You will be taken to the next sign-up page. 5. Create a [employer] ID. This will be your [employer] login ID and cannot be changed, so think of one that is secure and easy to remember. 6. Create a [employer] password. You can change your password at any time. 7. Enter your Password Reset Question and Answer. This can be used at a later time if you forget your password. 8. Enter your e-mail address. You will receive e-mail notification when new information is available in [address] 9. Click Sign Up. You can now view and download portions of your medical record. 10. Click the Download Summary menu link to download a portable copy of your medical information. If you have questions, please visit the Frequently Asked Questions section of the [employer] website. Remember, [employer] is NOT to be used for urgent needs. For medical emergencies, dial 911. Now available from your iPhone and Android! General Information Please provide this summary of care documentation to your next provider. Patient Signature:  ____________________________________________________________ Date:  ____________________________________________________________  
  
[facility] Provider Signature:  ____________________________________________________________ Date:  ____________________________________________________________

## 2017-01-03 NOTE — PROGRESS NOTES
Dr Yogesh Huerta notified via telephone of pts blood glucose of 78, pt given 1/2 Amp Dextrose 50% 12.5grams per protocol, blood glucose rechecked and 153. Pt upset with level of blood glucose (153), pt educated regarding unsafe low blood glucose while NPO and during a procedure. Dr Ivana Bragg) at bedside to assess pt, notified of blood glucose levels and treatment per protocol.

## 2017-01-03 NOTE — PROGRESS NOTES
EP/ End of Procedure/ TRANSFER - OUT REPORT:    Verbal report given to Alton Toro, Electrophysiology  on Equilla Aschoff for routine progression of care       Report consisted of patients Situation, Background, Assessment and   Recommendations(SBAR). Information from the following report(s) SBAR, Kardex, Procedure Summary and MAR was reviewed with the receiving nurse. Opportunity for questions and clarification was provided.

## 2017-01-03 NOTE — H&P
[facility]   [address] [facility] [address], [address]   HISTORY AND PHYSICAL       Name:  Lauren Marvin   MR#:  [id number]   :  1940   Account #:  [de-identified]        Date of Adm:  2017       CHIEF COMPLAINT: Prophylactic BiV ICD generator change. HISTORY OF PRESENT ILLNESS: This is a 78-year-old male with a   history of a [address] ICD, here for prophylactic   generator change. The device has medical advisory for battery. ALLERGIES:   1. CAFFEINE. 2. CODEINE. 3. PERCOCET. PAST MEDICAL HISTORY:   1. Coronary artery disease with remote coronary artery bypass grafting   in .   2. Hypertensive heart disease. 3. Chronic left bundle branch block. FAMILY HISTORY: Noncontributory. SOCIAL HISTORY: Noncontributory. REVIEW OF SYSTEMS   Noncontributory. PHYSICAL EXAMINATION   VITAL SIGNS: Blood pressure 101/65, pulse 60, respirations 14,   oxygen saturation 98% on room air. GENERAL: Nondiaphoretic, not in acute distress. RESPIRATORY: Unlabored, clear to auscultation bilaterally anteriorly. CARDIAC: Regular rate and rhythm. CHEST: Site is without erosion. NEUROLOGIC: Awake, appropriate, grossly nonfocal.    LABORATORY DATA: Outpatient labs were reviewed. IMPRESSION: Given the potential benefits, risks, and alternatives, he   agrees to proceed with prophylactic biventricular implantable   cardioverter-defibrillator generator change. It would be catastrophic   should his device prematurely and promptly deplete. RECOMMENDATIONS: After generator change, anticipate discharge   later today. MD KAREN Lomeli / Anay Dawkins   D:  2017   14:42   T:  2017   15:01   Job #:  [id number]

## 2017-01-04 VITALS
WEIGHT: 115 LBS | SYSTOLIC BLOOD PRESSURE: 124 MMHG | HEART RATE: 64 BPM | DIASTOLIC BLOOD PRESSURE: 63 MMHG | HEIGHT: 60 IN | TEMPERATURE: 98 F | RESPIRATION RATE: 18 BRPM | BODY MASS INDEX: 22.58 KG/M2 | OXYGEN SATURATION: 95 %

## 2017-01-04 LAB
ALBUMIN SERPL BCP-MCNC: 2.7 G/DL (ref 3.5–5)
ALBUMIN/GLOB SERPL: 0.8 {RATIO} (ref 1.1–2.2)
ALP SERPL-CCNC: 59 U/L (ref 45–117)
ALT SERPL-CCNC: 20 U/L (ref 12–78)
ANION GAP BLD CALC-SCNC: 9 MMOL/L (ref 5–15)
AST SERPL W P-5'-P-CCNC: 22 U/L (ref 15–37)
BASOPHILS # BLD AUTO: 0 K/UL (ref 0–0.1)
BASOPHILS # BLD: 0 % (ref 0–1)
BILIRUB SERPL-MCNC: 0.8 MG/DL (ref 0.2–1)
BUN SERPL-MCNC: 15 MG/DL (ref 6–20)
BUN/CREAT SERPL: 23 (ref 12–20)
CALCIUM SERPL-MCNC: 8.2 MG/DL (ref 8.5–10.1)
CHLORIDE SERPL-SCNC: 111 MMOL/L (ref 97–108)
CO2 SERPL-SCNC: 25 MMOL/L (ref 21–32)
CREAT SERPL-MCNC: 0.66 MG/DL (ref 0.7–1.3)
EOSINOPHIL # BLD: 0 K/UL (ref 0–0.4)
EOSINOPHIL NFR BLD: 0 % (ref 0–7)
ERYTHROCYTE [DISTWIDTH] IN BLOOD BY AUTOMATED COUNT: 16.2 % (ref 11.5–14.5)
GLOBULIN SER CALC-MCNC: 3.3 G/DL (ref 2–4)
GLUCOSE BLD STRIP.AUTO-MCNC: 122 MG/DL (ref 65–100)
GLUCOSE SERPL-MCNC: 105 MG/DL (ref 65–100)
HCT VFR BLD AUTO: 40.1 % (ref 36.6–50.3)
HGB BLD-MCNC: 13.2 G/DL (ref 12.1–17)
LYMPHOCYTES # BLD AUTO: 14 % (ref 12–49)
LYMPHOCYTES # BLD: 1.8 K/UL (ref 0.8–3.5)
MAGNESIUM SERPL-MCNC: 1.9 MG/DL (ref 1.6–2.4)
MCH RBC QN AUTO: 30.9 PG (ref 26–34)
MCHC RBC AUTO-ENTMCNC: 32.9 G/DL (ref 30–36.5)
MCV RBC AUTO: 93.9 FL (ref 80–99)
MONOCYTES # BLD: 1.2 K/UL (ref 0–1)
MONOCYTES NFR BLD AUTO: 9 % (ref 5–13)
NEUTS SEG # BLD: 9.9 K/UL (ref 1.8–8)
NEUTS SEG NFR BLD AUTO: 77 % (ref 32–75)
PLATELET # BLD AUTO: 152 K/UL (ref 150–400)
POTASSIUM SERPL-SCNC: 4 MMOL/L (ref 3.5–5.1)
PROT SERPL-MCNC: 6 G/DL (ref 6.4–8.2)
RBC # BLD AUTO: 4.27 M/UL (ref 4.1–5.7)
SERVICE CMNT-IMP: ABNORMAL
SODIUM SERPL-SCNC: 145 MMOL/L (ref 136–145)
WBC # BLD AUTO: 12.8 K/UL (ref 4.1–11.1)

## 2017-01-04 PROCEDURE — 74011250637 HC RX REV CODE- 250/637: Performed by: INTERNAL MEDICINE

## 2017-01-04 PROCEDURE — 85025 COMPLETE CBC W/AUTO DIFF WBC: CPT | Performed by: INTERNAL MEDICINE

## 2017-01-04 PROCEDURE — 83735 ASSAY OF MAGNESIUM: CPT | Performed by: INTERNAL MEDICINE

## 2017-01-04 PROCEDURE — 82962 GLUCOSE BLOOD TEST: CPT

## 2017-01-04 PROCEDURE — 80053 COMPREHEN METABOLIC PANEL: CPT | Performed by: INTERNAL MEDICINE

## 2017-01-04 PROCEDURE — 36415 COLL VENOUS BLD VENIPUNCTURE: CPT | Performed by: INTERNAL MEDICINE

## 2017-01-04 RX ADMIN — ASPIRIN 81 MG 81 MG: 81 TABLET ORAL at 09:55

## 2017-01-04 NOTE — PROGRESS NOTES
PROGRESS NOTE    NAME:  Leeann Lopez   :   1940   MRN:   [id number]     Date/Time:  2017 7:13 AM  Subjective:   History:  Chart reviewed and patient seen and examined and D/W his nurse this AM and all events noted. (Hospitalist's help appreciated yesterday} He was admitted yesterday for generator change for ICVD and apparently was doing well until late afternoon when he became a little confused with some drooling when he was attempted to be ambulated prior to planned D/C. Of note is that BP recordings were OK up until about 4 PM and subsequently after the event recordings were on the low side; however now improved to baseline since hydration. He has been up ambulating this AM w/o dizziness or further neurologic or cardio/respiratory events. There are no other c/o on complete ROS.       Medications reviewed:  Current Facility-Administered Medications   Medication Dose Route Frequency    0.9% sodium chloride infusion  100 mL/hr IntraVENous CONTINUOUS    dextrose (D50W) injection syrg 12.5 g  25 mL IntraVENous PRN    aspirin chewable tablet 81 mg  81 mg Oral DAILY    insulin lispro (HUMALOG) injection   SubCUTAneous AC&HS    glucose chewable tablet 16 g  4 Tab Oral PRN    dextrose (D50W) injection syrg 12.5-25 g  12.5-25 g IntraVENous PRN    glucagon (GLUCAGEN) injection 1 mg  1 mg IntraMUSCular PRN    acetaminophen (TYLENOL) tablet 650 mg  650 mg Oral Q4H PRN    ondansetron (ZOFRAN) injection 4 mg  4 mg IntraVENous Q6H PRN        Objective:   Vitals:  Visit Vitals    /61    Pulse 65    Temp 97.5 °F (36.4 °C)    Resp 17    Ht 4' 11.5\" (1.511 m)    Wt 52.2 kg (115 lb)    SpO2 98%    BMI 22.84 kg/m2    O2 Flow Rate (L/min): 2 l/min (oxygen removed.) O2 Device: Room air Temp (24hrs), Av.5 °F (36.4 °C), Min:97.1 °F (36.2 °C), Max:97.8 °F (36.6 °C)      Last 24hr Input/Output:    Intake/Output Summary (Last 24 hours) at 17 2515  Last data filed at 17 9486   Gross per 24 hour Intake              620 ml   Output                0 ml   Net              620 ml        PHYSICAL EXAM:  General:     Alert, cooperative, no distress, appears stated age. Head:    Normocephalic, without obvious abnormality, atraumatic. Eyes:    Conjunctivae/corneas clear. PERRLA  Nose:   Nares normal. No drainage or sinus tenderness. Throat:     Lips, mucosa, and tongue normal.  No Thrush  Neck:   Supple, symmetrical,  no adenopathy, thyroid: non tender     no carotid bruit and no JVD. Back:     Symmetric,  No CVA tenderness. Lungs:    Clear to auscultation bilaterally. No Wheezing or Rhonchi. No rales. Heart:    Regular rate and rhythm,  no murmur, rub or gallop. (STS at Incision site)  Abdomen:    Soft, non-tender. Not distended. Bowel sounds normal. No masses  Extremities:  Extremities normal, atraumatic, No cyanosis. No edema. No clubbing  Lymph nodes:  Cervical, supraclavicular normal.  Neurologic:  Normal strength, Alert and oriented X 3.        Lab Data Reviewed:    Recent Results (from the past 24 hour(s))   GLUCOSE, POC    Collection Time: 01/03/17 10:03 AM   Result Value Ref Range    Glucose (POC) 78 65 - 100 mg/dL    Performed by [employer] Diver    GLUCOSE, POC    Collection Time: 01/03/17 10:23 AM   Result Value Ref Range    Glucose (POC) 153 (H) 65 - 100 mg/dL    Performed by [employer] Diver    GLUCOSE, POC    Collection Time: 01/03/17  1:20 PM   Result Value Ref Range    Glucose (POC) 65 65 - 100 mg/dL    Performed by [address], POC    Collection Time: 01/03/17  1:46 PM   Result Value Ref Range    Glucose (POC) 76 65 - 100 mg/dL    Performed by [address], POC    Collection Time: 01/03/17  2:05 PM   Result Value Ref Range    Glucose (POC) 119 (H) 65 - 100 mg/dL    Performed by Moises Nuñez, POC    Collection Time: 01/03/17  5:31 PM   Result Value Ref Range    Glucose (POC) 124 (H) 65 - 100 mg/dL    Performed by Moises Hora, POC Collection Time: 01/03/17 10:38 PM   Result Value Ref Range    Glucose (POC) 120 (H) 65 - 100 mg/dL    Performed by Kae Fuentes    CBC WITH AUTOMATED DIFF    Collection Time: 01/04/17  4:52 AM   Result Value Ref Range    WBC 12.8 (H) 4.1 - 11.1 K/uL    RBC 4.27 4. 10 - 5.70 M/uL    HGB 13.2 12.1 - 17.0 g/dL    HCT 40.1 36.6 - 50.3 %    MCV 93.9 80.0 - 99.0 FL    MCH 30.9 26.0 - 34.0 PG    MCHC 32.9 30.0 - 36.5 g/dL    RDW 16.2 (H) 11.5 - 14.5 %    PLATELET 890 682 - 918 K/uL    NEUTROPHILS 77 (H) 32 - 75 %    LYMPHOCYTES 14 12 - 49 %    MONOCYTES 9 5 - 13 %    EOSINOPHILS 0 0 - 7 %    BASOPHILS 0 0 - 1 %    ABS. NEUTROPHILS 9.9 (H) 1.8 - 8.0 K/UL    ABS. LYMPHOCYTES 1.8 0.8 - 3.5 K/UL    ABS. MONOCYTES 1.2 (H) 0.0 - 1.0 K/UL    ABS. EOSINOPHILS 0.0 0.0 - 0.4 K/UL    ABS. BASOPHILS 0.0 0.0 - 0.1 K/UL   MAGNESIUM    Collection Time: 01/04/17  4:52 AM   Result Value Ref Range    Magnesium 1.9 1.6 - 2.4 mg/dL   METABOLIC PANEL, COMPREHENSIVE    Collection Time: 01/04/17  4:52 AM   Result Value Ref Range    Sodium 145 136 - 145 mmol/L    Potassium 4.0 3.5 - 5.1 mmol/L    Chloride 111 (H) 97 - 108 mmol/L    CO2 25 21 - 32 mmol/L    Anion gap 9 5 - 15 mmol/L    Glucose 105 (H) 65 - 100 mg/dL    BUN 15 6 - 20 MG/DL    Creatinine 0.66 (L) 0.70 - 1.30 MG/DL    BUN/Creatinine ratio 23 (H) 12 - 20      GFR est AA >60 >60 ml/min/1.73m2    GFR est non-AA >60 >60 ml/min/1.73m2    Calcium 8.2 (L) 8.5 - 10.1 MG/DL    Bilirubin, total 0.8 0.2 - 1.0 MG/DL    ALT 20 12 - 78 U/L    AST 22 15 - 37 U/L    Alk.  phosphatase 59 45 - 117 U/L    Protein, total 6.0 (L) 6.4 - 8.2 g/dL    Albumin 2.7 (L) 3.5 - 5.0 g/dL    Globulin 3.3 2.0 - 4.0 g/dL    A-G Ratio 0.8 (L) 1.1 - 2.2           Assessment/Plan:     Principal Problem:    Near syncope (1/3/2017)    Active Problems:    Cardiomyopathy ([facility] (1/3/2017)      Diabetes ([facility] (1/3/2017)      ASCVD (arteriosclerotic cardiovascular disease) (1/3/2017)      Hypertension (1/3/2017) Hyperlipidemia (1/3/2017)      DJD (degenerative joint disease) (1/3/2017)       ___________________________________________________  PLAN:    1. Had Vasovagal near syncopal; episode with relative hypotension and now recovered  2. No need for Neurology consult or further work up  3. Resume all usual meds  4.  BS a little on high side but still good  5.   Able to D/C when OK with Dr. Zak Dorsey      D/W patient, wife and his nurse      ___________________________________________________    Attending Physician: Bethanie Layne MD

## 2017-01-04 NOTE — PROGRESS NOTES
Patient ambulated in hallway without difficulty. Dressing CDI. No complaints. Discharge instructions reviewed with patient; to be discharged to home with wife. Site care instructions reviewed; site(s) CDI. Patient instructed on which medications to continue and which to start. Prescriptions sent to pharmacy. Medication info provided and reviewed with patient. Follow-up appointment information given. IV and tele box removed. Opportunity for questions provided; all questions answered. All belongings returned. Patient wheeled to front door via wheelchair by volunteer; to be transported home by wife.

## 2017-01-04 NOTE — PROGRESS NOTES
POD#1 site and device programming check OK. S/p BIV-ICD gen change yesterday. Right before discharge, developed neuro change, likely vasovagal episode. Resolved by the time I saw him. No recurrence. Dermabond intact. No hematoma. Ambulatory and taking oral.      Visit Vitals    /63 (BP 1 Location: Right arm, BP Patient Position: At rest)    Pulse 64    Temp 98 °F (36.7 °C)    Resp 18    Ht 4' 11.5\" (1.511 m)    Wt 52.2 kg (115 lb)    SpO2 95%    BMI 22.84 kg/m2       ND, NAD  L chest site OK. RRR, no rub. Lungs CTAB. No unilateral arm edema. Awake, appropriate, neuro grossly nonfocal.      PLAN:  Discharge to home with F/U in the office for wound and device programming check. All questions answered. Cephalexin x 5 days. Patient is aware of signs and sx warranting urgent med F/U or calling 911.

## 2017-01-04 NOTE — PROGRESS NOTES
Event(s) noted, patient seems OK on my interview. All questions answered. Hospitalist help appreciated.

## 2017-01-04 NOTE — ANESTHESIA POSTPROCEDURE EVALUATION
Post-Anesthesia Evaluation and Assessment    Patient: Chris Walton MRN: [id number]  SSN: [id number]    YOB: 1940  Age: 67 y.o. Sex: male       Cardiovascular Function/Vital Signs  Visit Vitals    /63 (BP 1 Location: Right arm, BP Patient Position: At rest)    Pulse 64    Temp 36.7 °C (98 °F)    Resp 18    Ht 4' 11.5\" (1.511 m)    Wt 52.2 kg (115 lb)    SpO2 95%    BMI 22.84 kg/m2       Patient is status post general anesthesia for * No procedures listed *. Nausea/Vomiting: None    Postoperative hydration reviewed and adequate. Pain:  Pain Scale 1: Numeric (0 - 10) (01/04/17 0752)  Pain Intensity 1: 0 (01/04/17 0752)   Managed    Neurological Status: At baseline    Mental Status and Level of Consciousness: Arousable    Pulmonary Status:   O2 Device: Room air (01/04/17 0752)   Adequate oxygenation and airway patent    Complications related to anesthesia: None    Post-anesthesia assessment completed.  No concerns    Signed By: Jarrell Mitchell MD     January 4, 2017

## 2017-01-04 NOTE — CARDIO/PULMONARY
C/P Rehab Note:    Chart Reviewed. Admitting diagnosis of Prophylactic BiV ICD generator change. PMH significant for:  1. CABG  2. HTN  3. DM  4. Hyperlipidemia  5. Cardiomyopathy EF 20%    Pt is a non smoker. Attempted to provide teaching materials but already has been discharged.

## 2017-08-05 PROBLEM — Z79.899 ON STATIN THERAPY: Status: ACTIVE | Noted: 2017-08-05

## 2017-08-05 PROBLEM — K57.90 DIVERTICULOSIS: Status: ACTIVE | Noted: 2017-08-05

## 2017-08-05 PROBLEM — N52.9 ED (ERECTILE DYSFUNCTION): Status: ACTIVE | Noted: 2017-08-05

## 2017-08-05 PROBLEM — K59.09 CONSTIPATION, CHRONIC: Status: ACTIVE | Noted: 2017-08-05

## 2017-08-05 PROBLEM — I25.10 ATHEROSCLEROTIC HEART DISEASE OF NATIVE CORONARY ARTERY WITHOUT ANGINA PECTORIS: Status: ACTIVE | Noted: 2017-08-05

## 2017-08-05 PROBLEM — M79.671 RIGHT FOOT PAIN: Status: ACTIVE | Noted: 2017-08-05

## 2017-08-05 PROBLEM — F41.9 ANXIETY: Status: ACTIVE | Noted: 2017-08-05

## 2017-08-05 PROBLEM — G62.9 NEUROPATHY: Status: ACTIVE | Noted: 2017-08-05

## 2017-08-05 PROBLEM — E29.1 HYPOGONADISM MALE: Status: ACTIVE | Noted: 2017-08-05

## 2017-08-05 PROBLEM — R00.1 BRADYCARDIA: Status: ACTIVE | Noted: 2017-08-05

## 2017-08-05 PROBLEM — E11.9 DIABETES MELLITUS (HCC): Status: ACTIVE | Noted: 2017-08-05

## 2017-08-05 RX ORDER — POLYETHYLENE GLYCOL 3350
POWDER (GRAM) MISCELLANEOUS
COMMUNITY
End: 2017-08-07 | Stop reason: SDUPTHER

## 2017-08-05 RX ORDER — ALPRAZOLAM 0.5 MG/1
TABLET ORAL
COMMUNITY
End: 2017-08-07 | Stop reason: ALTCHOICE

## 2017-08-05 RX ORDER — OMEPRAZOLE 20 MG/1
20 CAPSULE, DELAYED RELEASE ORAL DAILY
COMMUNITY
End: 2017-08-07 | Stop reason: ALTCHOICE

## 2017-08-05 RX ORDER — TESTOSTERONE CYPIONATE 200 MG/ML
INJECTION INTRAMUSCULAR ONCE
COMMUNITY
End: 2017-08-07 | Stop reason: ALTCHOICE

## 2017-08-05 RX ORDER — MELOXICAM 15 MG/1
15 TABLET ORAL DAILY
COMMUNITY
End: 2017-08-07 | Stop reason: ALTCHOICE

## 2017-08-07 ENCOUNTER — OFFICE VISIT (OUTPATIENT)
Dept: INTERNAL MEDICINE CLINIC | Age: 77
End: 2017-08-07

## 2017-08-07 VITALS
WEIGHT: 115 LBS | DIASTOLIC BLOOD PRESSURE: 80 MMHG | RESPIRATION RATE: 18 BRPM | SYSTOLIC BLOOD PRESSURE: 140 MMHG | OXYGEN SATURATION: 97 % | TEMPERATURE: 97.9 F | HEIGHT: 60 IN | HEART RATE: 60 BPM | BODY MASS INDEX: 22.58 KG/M2

## 2017-08-07 DIAGNOSIS — E78.2 MIXED HYPERLIPIDEMIA: ICD-10-CM

## 2017-08-07 DIAGNOSIS — I10 ESSENTIAL HYPERTENSION: Primary | ICD-10-CM

## 2017-08-07 DIAGNOSIS — M15.9 PRIMARY OSTEOARTHRITIS INVOLVING MULTIPLE JOINTS: ICD-10-CM

## 2017-08-07 DIAGNOSIS — Z00.00 MEDICARE ANNUAL WELLNESS VISIT, INITIAL: ICD-10-CM

## 2017-08-07 DIAGNOSIS — I25.10 ASCVD (ARTERIOSCLEROTIC CARDIOVASCULAR DISEASE): ICD-10-CM

## 2017-08-07 DIAGNOSIS — E11.9 TYPE 2 DIABETES MELLITUS WITHOUT COMPLICATION, WITHOUT LONG-TERM CURRENT USE OF INSULIN (HCC): ICD-10-CM

## 2017-08-07 DIAGNOSIS — Z12.11 COLON CANCER SCREENING: ICD-10-CM

## 2017-08-07 DIAGNOSIS — I42.9 CARDIOMYOPATHY, UNSPECIFIED TYPE (HCC): ICD-10-CM

## 2017-08-07 DIAGNOSIS — F41.9 ANXIETY: ICD-10-CM

## 2017-08-07 PROBLEM — R55 NEAR SYNCOPE: Status: RESOLVED | Noted: 2017-01-03 | Resolved: 2017-08-07

## 2017-08-07 PROBLEM — M79.671 RIGHT FOOT PAIN: Status: RESOLVED | Noted: 2017-08-05 | Resolved: 2017-08-07

## 2017-08-07 LAB
ALBUMIN SERPL-MCNC: 3.9 G/DL (ref 3.9–5.4)
ALKALINE PHOS POC: 79 U/L (ref 38–126)
ALT SERPL-CCNC: 34 U/L (ref 9–52)
AST SERPL-CCNC: 34 U/L (ref 14–36)
BUN BLD-MCNC: 18 MG/DL (ref 9–20)
CALCIUM BLD-MCNC: 10.1 MG/DL (ref 8.4–10.2)
CHLORIDE BLD-SCNC: 106 MMOL/L (ref 98–107)
CHOLEST SERPL-MCNC: 197 MG/DL (ref 0–200)
CK (CPK) POC: 103 U/L (ref 30–135)
CO2 POC: 28 MMOL/L (ref 22–32)
CREAT BLD-MCNC: 0.7 MG/DL (ref 0.8–1.5)
EGFR (POC): 90.4
GLUCOSE POC: 90 MG/DL (ref 75–110)
HBA1C MFR BLD HPLC: 6.1 % (ref 4–5.7)
HDLC SERPL-MCNC: 59 MG/DL (ref 35–130)
LDL CHOLESTEROL POC: 124.8 MG/DL (ref 0–130)
POTASSIUM SERPL-SCNC: 5.3 MMOL/L (ref 3.6–5)
PROT SERPL-MCNC: 7.1 G/DL (ref 6.3–8.2)
SODIUM SERPL-SCNC: 144 MMOL/L (ref 137–145)
TCHOL/HDL RATIO (POC): 3.3 (ref 0–4)
TOTAL BILIRUBIN POC: 0.7 MG/DL (ref 0.2–1.3)
TRIGL SERPL-MCNC: 66 MG/DL (ref 0–200)
VLDLC SERPL CALC-MCNC: 13.2 MG/DL

## 2017-08-07 RX ORDER — RAMIPRIL 10 MG/1
10 CAPSULE ORAL DAILY
COMMUNITY
Start: 2017-07-05 | End: 2018-04-10 | Stop reason: SDUPTHER

## 2017-08-07 RX ORDER — POLYETHYLENE GLYCOL 3350 17 G/17G
POWDER, FOR SOLUTION ORAL AS NEEDED
COMMUNITY
Start: 2017-05-30 | End: 2018-09-05 | Stop reason: SDUPTHER

## 2017-08-07 NOTE — PROGRESS NOTES
Chief Complaint   Patient presents with    Diabetes     3 month follow-up     1. Have you been to the ER, urgent care clinic since your last visit? Hospitalized since your last visit? No    2. Have you seen or consulted any other health care providers outside of the [address] [place] [address] since your last visit? Include any pap smears or colon screening.  No

## 2017-08-07 NOTE — PROGRESS NOTES
This is an Initial Medicare Annual Wellness Exam (AWV) (Performed 12 months after IPPE or effective date of Medicare Part B enrollment, Once in a lifetime)    I have reviewed the patient's medical history in detail and updated the computerized patient record. He presents today for his initial annual Medicare wellness examination. He is also here in follow-up of his medical problems include hypertension, hyperlipidemia, diabetes, cardiomyopathy, DJD, GERD, and generalized anxiety. .  He seems to be doing well and is taking his medications regularly. He is trying to get some exercise. He denies any chest pain shortness breath cardio instruments. There are no GI/ complaint. There are no neurologic complaints including headaches. There are no other complaints on complete review of systems.     History     Past Medical History:   Diagnosis Date    Anxiety 8/5/2017    Arrhythmia     Arthritis     Atherosclerotic heart disease of native coronary artery without angina pectoris 8/5/2017    Bradycardia 8/5/2017    CAD (coronary artery disease)     Constipation, chronic 8/5/2017    Diabetes ([address]     diet controlled    Diabetes mellitus ([address] 8/5/2017    Diverticulosis 8/5/2017    ED (erectile dysfunction) 8/5/2017    GERD (gastroesophageal reflux disease)     Hypertension     Hypogonadism male 8/5/2017    Kidney stone     Neuropathy ([address] 8/5/2017    On statin therapy 8/5/2017    Right foot pain 8/5/2017      Past Surgical History:   Procedure Laterality Date    CARDIAC SURG PROCEDURE UNLIST      cabg x4 vessels 1999    COLONOSCOPY N/A 6/21/2016    COLONOSCOPY performed by Rohit Perez., [facility] at [facility] ENDOSCOPY    COLORECTAL SCRN; HI RISK IND  6/21/2016         HX GI      COLONOSCOPY    HX HEART CATHETERIZATION      HX OTHER SURGICAL      artificial testicle    HX PACEMAKER  10/6/15    MA COLONOSCOPY FLX DX W/COLLJ SPEC WHEN PFRMD  4/8/2011         UPPER GI ENDOSCOPY,BIOPSY 12/14/2016          Current Outpatient Prescriptions   Medication Sig Dispense Refill    polyethylene glycol (MIRALAX) 17 gram/dose powder       ramipril (ALTACE) 10 mg capsule       UBIDECARENONE (CO Q-10 PO) Take  by mouth.  OTHER Nutratherm fat-furner stimulant      glucose blood VI test strips (TRUETEST TEST STRIPS) strip by Does Not Apply route See Admin Instructions.  tadalafil (CIALIS) 20 mg tablet Take 20 mg by mouth as needed.  OTHER Take 7 Tabs by mouth daily. [facility]      metoprolol succinate (TOPROL XL) 25 mg XL tablet Take 1 Tab by mouth daily. 30 Tab 11    aspirin 81 mg tablet Take 81 mg by mouth daily.        Allergies   Allergen Reactions    Caffeine Unknown (comments)     Sweating AND WOOZY AND CAN'T SLEEP    Codeine Other (comments)     Lightheaded, WOOZY AND CAN'T SLEEP    Percocet [Oxycodone-Acetaminophen] Nausea and Vomiting     Family History   Problem Relation Age of Onset    Heart Disease Mother     No Known Problems Father     Cancer Sister     Diabetes Brother     Anesth Problems Neg Hx      Social History   Substance Use Topics    Smoking status: Never Smoker    Smokeless tobacco: Never Used    Alcohol use No     Patient Active Problem List   Diagnosis Code    [facility] I42.9    ASCVD (arteriosclerotic cardiovascular disease) I25.10    Hypertension I10    Hyperlipidemia E78.5    DJD (degenerative joint disease) M19.90    Diverticulosis K57.90    On statin therapy Z79.899    Neuropathy ([facility] G62.9    Hypogonadism male E29.1    ED (erectile dysfunction) N52.9    Diabetes mellitus ([facility] E11.9    Constipation, chronic K59.09    Bradycardia R00.1    Anxiety F41.9    Medicare annual wellness visit, initial Z00.00    Colon cancer screening Z12.11         Depression Risk Factor Screening:     PHQ over the last two weeks 8/7/2017   Little interest or pleasure in doing things Not at all   Feeling down, depressed or hopeless Not at all   Total Score PHQ 2 0     Alcohol Risk Factor Screening: On any occasion during the past 3 months, have you had more than 4 drinks containing alcohol? No    Do you average more than 14 drinks per week? No      Functional Ability and Level of Safety:     Hearing Loss   none    Activities of Daily Living   Self-care. Requires assistance with: no ADLs    Fall Risk   Fall Risk Assessment, last 12 mths 8/7/2017   Able to walk? Yes   Fall in past 12 months? No     Abuse Screen   None  Patient is not abused    Review of Systems   Constitutional: He denies fevers, weight loss, sweats. Eyes: No blurred or double vision. ENT: No difficulty with swallowing, taste, speech or smell. Neck: no stiffness or swelling  Respiratory: No cough wheezing or shortness of breath. Cardiovascular: Denies chest pain, palpitations, unexplained indigestion or syncope. Gastrointestinal:  No changes in bowel movements, no abdominal pain, no bloating. Genitourinary:  He denies frequency, nocturia or stranguria. Extremities: No joint pain, stiffness or swelling. Neurological:  No numbness, tingling, burring paresthesias or loss of motor strength. No syncope, dizziness or frequent headache  Lymphatic: no adenopathy noted  Hematologic: no easy bruising or bleeding gums  Skin:  No recent rashes or mole changes. Psychiatric/Behavioral:  Negative for depression.   T    Physical Examination         Evaluation of Cognitive Function:  Mood/affect:  neutral  Appearance: age appropriate  Family member/caregiver input: None    Vitals:    08/07/17 0911   BP: 140/80   Pulse: 60   Resp: 18   Temp: 97.9 °F (36.6 °C)   TempSrc: Oral   SpO2: 97%   Weight: 115 lb (52.2 kg)   Height: 4' 11.84\" (1.52 m)   PainSc:   0 - No pain        General appearance - alert, well appearing, and in no distress  Mental status - alert, oriented to person, place, and time  HEENT:  Ears - bilateral TM's and external ear canals clear  Eyes - pupillary responses were normal.  Extraocular muscle function intact. Lids and conjunctiva not injected. Fundoscopic exam revealed sharp disc margins. eye movements intact  Pharynx- clear with teeth in good repair. No masses were noted  Neck - supple without thyromegaly or burit. No JVD noted  Lungs - clear to auscultation and percussion  Cardiac- normal rate, regular rhythm without murmurs. PMI not displaced. No gallop, rub or click  Abdomen - flat, soft, non-tender without palpable organomegaly or mass. No pulsatile mass was felt, and not bruit was heard. Bowel sounds were active  : Circumcised, Testes descended w/o masses  Rectal: Deferred to next visit  Extremities -  no clubbing cyanosis or edema  Lymphatics - no palpable lymphadenopathy, no hepatosplenomegaly  Hematologic: no petechiae or purpura  Peripheral vascular -Femoral, Dorsalis pedis and posterior tibial pulses felt without difficulty  Skin - no rash or unusual mole change noted  Neurological - Cranial nerves II-XII grossly intact. Motor strength 5/5. DTR's 2+ and symmetric. Station and gait normal  Back exam - full range of motion, no tenderness, palpable spasm or pain on motion  Musculoskeletal - no joint tenderness, deformity or swelling      Patient Care Team:  Mile Olivas MD as PCP - General (Internal Medicine)    Advice/Referrals/Counseling   Education and counseling provided:  Colorectal cancer screening tests      Assessment/Plan     ASSESSMENT:   1. Essential hypertension    2. Mixed hyperlipidemia    3. Primary osteoarthritis involving multiple joints    4. Type 2 diabetes mellitus without complication, without long-term current use of insulin (HCC)    5. Cardiomyopathy, unspecified type ([facility]    6. ASCVD (arteriosclerotic cardiovascular disease)    7. Anxiety    8. Medicare annual wellness visit, initial    9. Colon cancer screening      Impression  1. Hypertension that is well controlled today  2.   Hyperlipidemia we will see what that status is reviewed last numbers with him  3. DJD seems to be stable currently  4. Diabetes we will see what that status is I reviewed his last numbers and his medications  5. Cardiomyopathy seems to be stable  6. ASCVD seems to be stable  7. Anxiety that is currently stable  Medicare annual wellness examination and question are performed on patient today. Results were reviewed with him. Questions were answered. Lifestyle modifications and adjustments discussed with patient. We will call with the above lab results make further recommendations adjustments of all stable continue same and I will recheck him again in 3 months sooner should to be a problem    PLAN:  .  Orders Placed This Encounter    AMB POC LIPID PROFILE    AMB POC HEMOGLOBIN A1C    AMB POC CK (CPK)    AMB POC COMPREHENSIVE METABOLIC PANEL    AMB POC FECAL OCCULT BLOOD QL-3 CARDS    polyethylene glycol (MIRALAX) 17 gram/dose powder    ramipril (ALTACE) 10 mg capsule    UBIDECARENONE (CO Q-10 PO)    OTHER         ATTENTION:   This medical record was transcribed using an electronic medical records system. Although proofread, it may and can contain electronic and spelling errors. Other human spelling and other errors may be present. Corrections may be executed at a later time. Please feel free to contact us for any clarifications as needed. Follow-up Disposition:  Return in about 3 months (around 11/7/2017).       Juaquin Rodriguez MD.

## 2017-08-07 NOTE — MR AVS SNAPSHOT
Visit Information Date & Time Provider Department Dept. Phone Encounter #  
 8/7/2017  8:20 AM Lavon Barrett MD [address] [place] [address] [facility] [phone] [id number] Upcoming Health Maintenance Date Due HEMOGLOBIN A1C Q6M 1940 LIPID PANEL Q1 1940 FOOT EXAM Q1 7/1/1949 MICROALBUMIN Q1 7/1/1949 EYE EXAM RETINAL OR DILATED Q1 7/1/1949 DTaP/Tdap/Td series (1 - Tdap) 7/1/1960 ZOSTER VACCINE AGE 60> 5/1/1999 GLAUCOMA SCREENING Q2Y 7/1/2004 MEDICARE YEARLY EXAM 7/1/2004 INFLUENZA AGE 9 TO ADULT 8/1/2017 Allergies as of 8/7/2017  Review Complete On: 8/7/2017 By: Lavon Barrett MD  
  
 Severity Noted Reaction Type Reactions Caffeine  04/08/2011    Unknown (comments) Sweating AND WOOZY AND CAN'T SLEEP Codeine  04/07/2011    Other (comments) Lightheaded, WOOZY AND CAN'T SLEEP Percocet [Oxycodone-acetaminophen]  10/28/2015    Nausea and Vomiting Current Immunizations  Reviewed on 4/6/2016 Name Date Influenza Vaccine 10/1/2016, 12/16/2015, 11/10/2014 Influenza Vaccine (Quad) PF 10/7/2015  8:47 AM  
 Pneumococcal Conjugate (PCV-13) 9/14/2015 Pneumococcal Vaccine (Unspecified Type) 10/17/2011 Not reviewed this visit You Were Diagnosed With   
  
 Codes Comments Essential hypertension    -  Primary ICD-10-CM: I10 
ICD-9-CM: 401.9 Mixed hyperlipidemia     ICD-10-CM: E78.2 ICD-9-CM: 272.2 Primary osteoarthritis involving multiple joints     ICD-10-CM: M15.0 ICD-9-CM: 715.09 Type 2 diabetes mellitus without complication, without long-term current use of insulin (HCC)     ICD-10-CM: E11.9 ICD-9-CM: 250.00 Cardiomyopathy, unspecified type ([facility]     ICD-10-CM: I42.9 ICD-9-CM: 425.4 ASCVD (arteriosclerotic cardiovascular disease)     ICD-10-CM: I25.10 ICD-9-CM: 429.2, 440.9 Anxiety     ICD-10-CM: F41.9 ICD-9-CM: 300.00 Vitals BP Pulse Temp Resp Height(growth percentile) Weight(growth percentile) 140/80 (BP 1 Location: Left arm, BP Patient Position: Sitting) 60 97.9 °F (36.6 °C) (Oral) 18 4' 11.84\" (1.52 m) 115 lb (52.2 kg) SpO2 BMI Smoking Status 97% 22.58 kg/m2 Never Smoker Vitals History BMI and BSA Data Body Mass Index Body Surface Area  
 22.58 kg/m 2 1.48 m 2 Preferred Pharmacy Pharmacy Name Phone [facility] [address], [address] [place] [address] Avenue [phone] Your Updated Medication List  
  
   
This list is accurate as of: 8/7/17  9:43 AM.  Always use your most recent med list.  
  
  
  
  
 aspirin 81 mg tablet Take 81 mg by mouth daily. CIALIS 20 mg tablet Generic drug:  tadalafil Take 20 mg by mouth as needed. CO Q-10 PO Take  by mouth.  
  
 metoprolol succinate 25 mg XL tablet Commonly known as:  TOPROL XL Take 1 Tab by mouth daily. * OTHER Take 7 Tabs by mouth daily. [address]  
  
 * OTHER Nutratherm fat-furner stimulant  
  
 polyethylene glycol 17 gram/dose powder Commonly known as:  MIRALAX  
  
 ramipril 10 mg capsule Commonly known as:  ALTACE  
  
 TRUETEST TEST STRIPS strip Generic drug:  glucose blood VI test strips  
by Does Not Apply route See Admin Instructions. * Notice: This list has 2 medication(s) that are the same as other medications prescribed for you. Read the directions carefully, and ask your doctor or other care provider to review them with you. We Performed the Following AMB POC CK (CPK) [89435 CPT(R)] AMB POC COMPREHENSIVE METABOLIC PANEL [24449 CPT(R)] AMB POC HEMOGLOBIN A1C [87517 CPT(R)] AMB POC LIPID PROFILE [69447 CPT(R)] Introducing [facility]! [address] introduces [employer] patient portal. Now you can access parts of your medical record, email your doctor's office, and request medication refills online. 1. In your internet browser, go to [email or web address] 2. Click on the First Time User? Click Here link in the Sign In box. You will see the New Member Sign Up page. 3. Enter your O[employer] Access Code exactly as it appears below. You will not need to use this code after youve completed the sign-up process. If you do not sign up before the expiration date, you must request a new code. · [employer] Access Code: [email or web address] Expires: 11/5/2017  8:49 AM 
 
4. Enter the last four digits of your Social Security Number (xxxx) and Date of Birth (mm/dd/yyyy) as indicated and click Submit. You will be taken to the next sign-up page. 5. Create a [employer] ID. This will be your Omnikles login ID and cannot be changed, so think of one that is secure and easy to remember. 6. Create a [employer] password. You can change your password at any time. 7. Enter your Password Reset Question and Answer. This can be used at a later time if you forget your password. 8. Enter your e-mail address. You will receive e-mail notification when new information is available in [address] 9. Click Sign Up. You can now view and download portions of your medical record. 10. Click the Download Summary menu link to download a portable copy of your medical information. If you have questions, please visit the Frequently Asked Questions section of the Omnikles website. Remember, Omnikles is NOT to be used for urgent needs. For medical emergencies, dial 911. Now available from your iPhone and Android! Please provide this summary of care documentation to your next provider. Your primary care clinician is listed as Henry. If you have any questions after today's visit, please call [phone].

## 2017-08-10 LAB
HEMOCCULT STL QL: NEGATIVE
VALID INTERNAL CONTROL?: YES

## 2017-10-24 ENCOUNTER — OFFICE VISIT (OUTPATIENT)
Dept: INTERNAL MEDICINE CLINIC | Age: 77
End: 2017-10-24

## 2017-10-24 VITALS
HEIGHT: 60 IN | TEMPERATURE: 97.9 F | WEIGHT: 115.8 LBS | SYSTOLIC BLOOD PRESSURE: 139 MMHG | BODY MASS INDEX: 22.74 KG/M2 | OXYGEN SATURATION: 97 % | RESPIRATION RATE: 16 BRPM | DIASTOLIC BLOOD PRESSURE: 85 MMHG | HEART RATE: 59 BPM

## 2017-10-24 DIAGNOSIS — I25.10 ASCVD (ARTERIOSCLEROTIC CARDIOVASCULAR DISEASE): ICD-10-CM

## 2017-10-24 DIAGNOSIS — Z23 ENCOUNTER FOR IMMUNIZATION: ICD-10-CM

## 2017-10-24 DIAGNOSIS — I42.9 CARDIOMYOPATHY, UNSPECIFIED TYPE (HCC): ICD-10-CM

## 2017-10-24 DIAGNOSIS — I10 ESSENTIAL HYPERTENSION: ICD-10-CM

## 2017-10-24 DIAGNOSIS — E11.9 TYPE 2 DIABETES MELLITUS WITHOUT COMPLICATION, WITHOUT LONG-TERM CURRENT USE OF INSULIN (HCC): Primary | ICD-10-CM

## 2017-10-24 NOTE — PATIENT INSTRUCTIONS

## 2017-10-24 NOTE — MR AVS SNAPSHOT
Visit Information Date & Time Provider Department Dept. Phone Encounter #  
 10/24/2017  1:40 PM Jackelin Cardozo MD [facility] [id number] Follow-up Instructions Return if symptoms worsen or fail to improve. Your Appointments 11/20/2017  8:20 AM  
FOLLOW UP 10 with MD GUILLE Lopez [facility] ([facility]) Appt Note: 3 month flp [address] [phone] [address] [facility] [address] Upcoming Health Maintenance Date Due  
 EYE EXAM RETINAL OR DILATED Q1 7/1/1949 DTaP/Tdap/Td series (1 - Tdap) 7/1/1960 ZOSTER VACCINE AGE 60> 5/1/1999 GLAUCOMA SCREENING Q2Y 7/1/2004 MICROALBUMIN Q1 10/17/2017 HEMOGLOBIN A1C Q6M 2/7/2018 FOOT EXAM Q1 8/7/2018 LIPID PANEL Q1 8/7/2018 MEDICARE YEARLY EXAM 8/8/2018 COLONOSCOPY 6/21/2021 Allergies as of 10/24/2017  Review Complete On: 10/24/2017 By: Jackelin Cardozo MD  
  
 Severity Noted Reaction Type Reactions Caffeine  04/08/2011    Unknown (comments) Sweating AND WOOZY AND CAN'T SLEEP Codeine  04/07/2011    Other (comments) Lightheaded, WOOZY AND CAN'T SLEEP Percocet [Oxycodone-acetaminophen]  10/28/2015    Nausea and Vomiting Current Immunizations  Reviewed on 4/6/2016 Name Date Influenza High Dose Vaccine PF  Incomplete Influenza Vaccine 10/1/2016, 12/16/2015, 11/10/2014 Influenza Vaccine (Quad) PF 10/7/2015  8:47 AM  
 Pneumococcal Conjugate (PCV-13) 9/14/2015 Pneumococcal Vaccine (Unspecified Type) 10/17/2011 Not reviewed this visit You Were Diagnosed With   
  
 Codes Comments Type 2 diabetes mellitus without complication, without long-term current use of insulin (HCC)    -  Primary ICD-10-CM: E11.9 ICD-9-CM: 250.00 Essential hypertension     ICD-10-CM: I10 
ICD-9-CM: 401.9 ASCVD (arteriosclerotic cardiovascular disease)     ICD-10-CM: I25.10 ICD-9-CM: 429.2, 440.9 Cardiomyopathy, unspecified type ([facility]     ICD-10-CM: I42.9 ICD-9-CM: 425.4 Encounter for immunization     ICD-10-CM: G42 ICD-9-CM: V03.89 Vitals BP Pulse Temp Resp Height(growth percentile) Weight(growth percentile) 139/85 (BP 1 Location: Left arm, BP Patient Position: Sitting) (!) 59 97.9 °F (36.6 °C) (Oral) 16 4' 11\" (1.499 m) 115 lb 12.8 oz (52.5 kg) SpO2 BMI Smoking Status 97% 23.39 kg/m2 Never Smoker Vitals History BMI and BSA Data Body Mass Index Body Surface Area  
 23.39 kg/m 2 1.48 m 2 Preferred Pharmacy Pharmacy Name Phone [facility] [address] [place] [address] [address], [address] [phone] Your Updated Medication List  
  
   
This list is accurate as of: 10/24/17  2:56 PM.  Always use your most recent med list.  
  
  
  
  
 aspirin 81 mg tablet Take 81 mg by mouth daily. CIALIS 20 mg tablet Generic drug:  tadalafil Take 20 mg by mouth as needed. CO Q-10 PO Take  by mouth.  
  
 metoprolol succinate 25 mg XL tablet Commonly known as:  TOPROL XL Take 1 Tab by mouth daily. * OTHER Take 7 Tabs by mouth daily. [address]  
  
 * OTHER Nutratherm fat-furner stimulant  
  
 polyethylene glycol 17 gram/dose powder Commonly known as:  Kreg Patron  
as needed. ramipril 10 mg capsule Commonly known as:  ALTACE  
  
 TRUETEST TEST STRIPS strip Generic drug:  glucose blood VI test strips  
by Does Not Apply route See Admin Instructions. * Notice: This list has 2 medication(s) that are the same as other medications prescribed for you. Read the directions carefully, and ask your doctor or other care provider to review them with you. We Performed the Following ADMIN INFLUENZA VIRUS VAC [ HCP] INFLUENZA VIRUS VACCINE, HIGH DOSE SEASONAL, PRESERVATIVE FREE [35688 CPT(R)] Follow-up Instructions Return if symptoms worsen or fail to improve. Introducing \[facility] [place] [facility]\"" & [facility]! Ron Herbert introduces [employer] patient portal. Now you can access parts of your medical record, email your doctor's office, and request medication refills online. 1. In your internet browser, go to [email or web address] 2. Click on the First Time User? Click Here link in the Sign In box. You will see the New Member Sign Up page. 3. Enter your [employer] Access Code exactly as it appears below. You will not need to use this code after youve completed the sign-up process. If you do not sign up before the expiration date, you must request a new code. · [employer] Access Code: [email or web address] Expires: 11/5/2017  8:49 AM 
 
4. Enter the last four digits of your Social Security Number (xxxx) and Date of Birth (mm/dd/yyyy) as indicated and click Submit. You will be taken to the next sign-up page. 5. Create a [employer] ID. This will be your [employer] login ID and cannot be changed, so think of one that is secure and easy to remember. 6. Create a [employer] password. You can change your password at any time. 7. Enter your Password Reset Question and Answer. This can be used at a later time if you forget your password. 8. Enter your e-mail address. You will receive e-mail notification when new information is available in [address] 9. Click Sign Up. You can now view and download portions of your medical record. 10. Click the Download Summary menu link to download a portable copy of your medical information. If you have questions, please visit the Frequently Asked Questions section of the [employer] website. Remember, [employer] is NOT to be used for urgent needs. For medical emergencies, dial 911. Now available from your iPhone and Android! Please provide this summary of care documentation to your next provider. Your primary care clinician is listed as Henry. If you have any questions after today's visit, please call [phone].

## 2017-10-24 NOTE — PROGRESS NOTES
Subjective:   Jeralene Kussmaul is a 65 y.o. male      Chief Complaint   Patient presents with    Documentation        History of present illness: He has multiple medical problems specifically consisting of ASCVD, cardiomyopathy, hypertension, and diabetes among many others. He is here to participate in a [facility] diabetes class and needs my okay. He notes no chest pain shortness breath PND orthopnea or any other cardiorespiratory complaints. His blood sugars have been doing well. He notes no polyuria polydipsia and has no other complaints.     Patient Active Problem List   Diagnosis Code    Cardiomyopathy ([facility] I42.9    ASCVD (arteriosclerotic cardiovascular disease) I25.10    Hypertension I10    Hyperlipidemia E78.5    DJD (degenerative joint disease) M19.90    Diverticulosis K57.90    On statin therapy Z79.899    Neuropathy G62.9    Hypogonadism male E29.1    ED (erectile dysfunction) N52.9    Diabetes mellitus ([facility] E11.9    Constipation, chronic K59.09    Bradycardia R00.1    Anxiety F41.9    Medicare annual wellness visit, initial Z00.00    Colon cancer screening Z12.11      Past Medical History:   Diagnosis Date    Anxiety 8/5/2017    Arrhythmia     Arthritis     Atherosclerotic heart disease of native coronary artery without angina pectoris 8/5/2017    Bradycardia 8/5/2017    CAD (coronary artery disease)     Constipation, chronic 8/5/2017    Diabetes ([facility]     diet controlled    Diabetes mellitus ([facility] 8/5/2017    Diverticulosis 8/5/2017    ED (erectile dysfunction) 8/5/2017    GERD (gastroesophageal reflux disease)     Hypertension     Hypogonadism male 8/5/2017    Kidney stone     Neuropathy 8/5/2017    On statin therapy 8/5/2017    Right foot pain 8/5/2017      Allergies   Allergen Reactions    Caffeine Unknown (comments)     Sweating AND WOOZY AND CAN'T SLEEP    Codeine Other (comments)     Lightheaded, WOOZY AND CAN'T SLEEP    Percocet [Oxycodone-Acetaminophen] Nausea and Vomiting      Family History   Problem Relation Age of Onset    Heart Disease Mother     No Known Problems Father     Cancer Sister     Diabetes Brother     Anesth Problems Neg Hx       Social History     Social History    Marital status:      Spouse name: N/A    Number of children: N/A    Years of education: N/A     Occupational History    Not on file. Social History Main Topics    Smoking status: Never Smoker    Smokeless tobacco: Never Used    Alcohol use No    Drug use: No    Sexual activity: Not on file     Other Topics Concern    Not on file     Social History Narrative     Prior to Admission medications    Medication Sig Start Date End Date Taking? Authorizing Provider   polyethylene glycol (MIRALAX) 17 gram/dose powder as needed. 5/30/17  Yes Historical Provider   ramipril (ALTACE) 10 mg capsule  7/5/17  Yes Historical Provider   UBIDECARENONE (CO Q-10 PO) Take  by mouth. Yes Historical Provider   OTHER Nutratherm fat-furner stimulant   Yes Historical Provider   glucose blood VI test strips (TRUETEST TEST STRIPS) strip by Does Not Apply route See Admin Instructions. Yes Historical Provider   tadalafil (CIALIS) 20 mg tablet Take 20 mg by mouth as needed. Yes Historical Provider   OTHER Take 7 Tabs by mouth daily. [address]   Yes Historical Provider   metoprolol succinate (TOPROL XL) 25 mg XL tablet Take 1 Tab by mouth daily. 10/6/15  Yes Anuja Braswell MD   aspirin 81 mg tablet Take 81 mg by mouth daily. 4/7/11  Yes Historical Provider        Review of Systems              Constitutional:  He denies fever, weight loss, sweats or fatigue. EYES: No blurred or double vision,               ENT: no nasal congestion, no headache or dizziness. No difficulty with               swallowing, taste, speech or smell. Respiratory:  No cough, wheezing or shortness of breath. No sputum production.   Cardiac:  Denies chest pain, palpitations, unexplained indigestion, syncope, edema, PND or orthopnea. GI:  No changes in bowel movements, no abdominal pain, no bloating, anorexia, nausea, vomiting or heartburn. :  No frequency or dysuria. Denies incontinence or sexual dysfunction. Extremities:  No joint pain, stiffness or swelling  Back:.no pain or soreness  Skin:  No recent rashes or mole changes. Neurological:  No numbness, tingling, burning paresthesias or loss of motor strength. No syncope, dizziness, frequent headaches or memory loss. Hematologic:  No easy bruising  Lymphatic: No lymph node enlargement    Objective:     Vitals:    10/24/17 1347   BP: 139/85   Pulse: (!) 59   Resp: 16   Temp: 97.9 °F (36.6 °C)   TempSrc: Oral   SpO2: 97%   Weight: 115 lb 12.8 oz (52.5 kg)   Height: 4' 11\" (1.499 m)   PainSc:   0 - No pain       Body mass index is 23.39 kg/(m^2). Physical Examination:              General Appearance:  Well-developed, well-nourished, no acute distress. HEENT:      Ears:  The TMs and ear canals were clear. Eyes:  The pupillary responses were normal.  Extraocular muscle function intact. Lids and conjunctiva not injected. Funduscopic exam revealed sharp disc margins. Nares: Clear w/o edema or erythema  Pharynx:  Clear with teeth in good repair. No masses were noted. Neck:  Supple without thyromegaly or adenopathy. No JVD noted. No carotid                bruits. Lungs:  Clear to auscultation and percussion. Cardiac:  Regular rate and rhythm without murmur. PMI not displaced. No gallop, rub or click. Abdominal: Soft, non-tender, no hepata-spleenomegally or masses  Extremities:  No clubbing, cyanosis or edema. Skin:  No rash or unusual mole changes noted. Lymph Nodes:  None felt in the cervical, supraclavicular, axillary or inguinal region. Neurological: . DTRs 2+ and symmetric. Station and gait normal.   Hematologic:   No purpura or petechiae        Assessment/Plan:         1.  Type 2 diabetes mellitus without complication, without long-term current use of insulin ([facility]    2. Essential hypertension    3. ASCVD (arteriosclerotic cardiovascular disease)    4. Cardiomyopathy, unspecified type ([facility]    5. Encounter for immunization        Impressions/Plan:  Impression  1. Diabetes A1c in April was 5.6 and in July was 6.1.  2.  Hypertension that is well controlled  3. ASCVD clinically asymptomatic as is his cardiomyopathy form was filled out and he is okay for [facility] participation. Flu shot is given today we will recheck him at his previous schedule appointment for his other medical problems    Orders Placed This Encounter    Influenza virus vaccine ([address]) 65 years and older ([id number])       Follow-up Disposition:  Return if symptoms worsen or fail to improve. No results found for any visits on 10/24/17. Salvatore Hudson MD    The patient was given after the visit summary the patient verbalized an understanding of the plans and problems as explained.

## 2017-10-24 NOTE — PROGRESS NOTES
Krupa Camara is a 65 y.o. male      Chief Complaint   Patient presents with    Documentation         1. Have you been to the ER, urgent care clinic since your last visit? Hospitalized since your last visit? No    2. Have you seen or consulted any other health care providers outside of the [address] [place] [address] since your last visit? Include any pap smears or colon screening.  No

## 2017-11-20 ENCOUNTER — OFFICE VISIT (OUTPATIENT)
Dept: INTERNAL MEDICINE CLINIC | Age: 77
End: 2017-11-20

## 2017-11-20 VITALS
HEART RATE: 60 BPM | HEIGHT: 60 IN | RESPIRATION RATE: 14 BRPM | OXYGEN SATURATION: 97 % | DIASTOLIC BLOOD PRESSURE: 82 MMHG | SYSTOLIC BLOOD PRESSURE: 124 MMHG | BODY MASS INDEX: 22.65 KG/M2 | TEMPERATURE: 97.3 F | WEIGHT: 115.4 LBS

## 2017-11-20 DIAGNOSIS — F41.9 ANXIETY: ICD-10-CM

## 2017-11-20 DIAGNOSIS — I42.9 CARDIOMYOPATHY, UNSPECIFIED TYPE (HCC): ICD-10-CM

## 2017-11-20 DIAGNOSIS — E11.9 TYPE 2 DIABETES MELLITUS WITHOUT COMPLICATION, WITHOUT LONG-TERM CURRENT USE OF INSULIN (HCC): ICD-10-CM

## 2017-11-20 DIAGNOSIS — I25.10 ASCVD (ARTERIOSCLEROTIC CARDIOVASCULAR DISEASE): ICD-10-CM

## 2017-11-20 DIAGNOSIS — M15.9 PRIMARY OSTEOARTHRITIS INVOLVING MULTIPLE JOINTS: ICD-10-CM

## 2017-11-20 DIAGNOSIS — E78.2 MIXED HYPERLIPIDEMIA: ICD-10-CM

## 2017-11-20 DIAGNOSIS — I10 ESSENTIAL HYPERTENSION: Primary | ICD-10-CM

## 2017-11-20 LAB
ALBUMIN SERPL-MCNC: 3.9 G/DL (ref 3.9–5.4)
ALKALINE PHOS POC: 77 U/L (ref 38–126)
ALT SERPL-CCNC: 40 U/L (ref 9–52)
AST SERPL-CCNC: 36 U/L (ref 14–36)
BUN BLD-MCNC: 16 MG/DL (ref 9–20)
CALCIUM BLD-MCNC: 10 MG/DL (ref 8.4–10.2)
CHLORIDE BLD-SCNC: 105 MMOL/L (ref 98–107)
CHOLEST SERPL-MCNC: 191 MG/DL (ref 0–200)
CK (CPK) POC: 98 U/L (ref 30–135)
CO2 POC: 31 MMOL/L (ref 22–32)
CREAT BLD-MCNC: 0.8 MG/DL (ref 0.8–1.5)
EGFR (POC): 85.6
GLUCOSE POC: 75 MG/DL (ref 75–110)
HBA1C MFR BLD HPLC: 6.4 % (ref 4.5–5.7)
HDLC SERPL-MCNC: 59 MG/DL (ref 35–130)
LDL CHOLESTEROL POC: 119.4 MG/DL (ref 0–130)
MICROALBUMIN UR TEST STR-MCNC: NORMAL MG/L (ref 0–20)
POTASSIUM SERPL-SCNC: 4.8 MMOL/L (ref 3.6–5)
PROT SERPL-MCNC: 7 G/DL (ref 6.3–8.2)
SODIUM SERPL-SCNC: 144 MMOL/L (ref 137–145)
TCHOL/HDL RATIO (POC): 3.2 (ref 0–4)
TOTAL BILIRUBIN POC: 0.8 MG/DL (ref 0.2–1.3)
TRIGL SERPL-MCNC: 63 MG/DL (ref 0–200)
VLDLC SERPL CALC-MCNC: 12.6 MG/DL

## 2017-11-20 RX ORDER — BLOOD-GLUCOSE METER
EACH MISCELLANEOUS
Qty: 1 EACH | Refills: 0 | Status: SHIPPED | OUTPATIENT
Start: 2017-11-20 | End: 2021-07-26 | Stop reason: SDUPTHER

## 2017-11-20 NOTE — PROGRESS NOTES
1. Have you been to the ER, urgent care clinic since your last visit? Hospitalized since your last visit? No    2. Have you seen or consulted any other health care providers outside of the [address] [place] [address] since your last visit? Include any pap smears or colon screening. No     Chief Complaint   Patient presents with    Hypertension     3 month follow up    Diabetes     3 month follow up    Cholesterol Problem     3 month follow up     Fasting    Eye exam done around 9/2017- patient does not remember the name of the eye doctor.

## 2017-11-20 NOTE — MR AVS SNAPSHOT
Visit Information Date & Time Provider Department Dept. Phone Encounter #  
 11/20/2017  8:20 AM Pilo Hanson MD [address] [place] [address] [facility] [phone] [id number] Upcoming Health Maintenance Date Due  
 EYE EXAM RETINAL OR DILATED Q1 7[birth year]1/1950 DTaP/Tdap/Td series (1 - Tdap) 7/1/1960 ZOSTER VACCINE AGE 60> 5/1/1999 GLAUCOMA SCREENING Q2Y 7/1/2004 MICROALBUMIN Q1 10/17/2017 HEMOGLOBIN A1C Q6M 2/7/2018 FOOT EXAM Q1 8/7/2018 LIPID PANEL Q1 8/7/2018 MEDICARE YEARLY EXAM 8/8/2018 COLONOSCOPY 6/21/2021 Allergies as of 11/20/2017  Review Complete On: 11/20/2017 By: Pilo Hanson MD  
  
 Severity Noted Reaction Type Reactions Caffeine  04/08/2011    Unknown (comments) Sweating AND WOOZY AND CAN'T SLEEP Codeine  04/07/2011    Other (comments) Lightheaded, WOOZY AND CAN'T SLEEP Percocet [Oxycodone-acetaminophen]  10/28/2015    Nausea and Vomiting Current Immunizations  Reviewed on 4/6/2016 Name Date Influenza High Dose Vaccine PF 10/24/2017 Influenza Vaccine 10/1/2016, 12/16/2015, 11/10/2014 Influenza Vaccine (Quad) PF 10/7/2015  8:47 AM  
 Pneumococcal Conjugate (PCV-13) 9/14/2015 Pneumococcal Vaccine (Unspecified Type) 10/17/2011 Not reviewed this visit You Were Diagnosed With   
  
 Codes Comments Essential hypertension    -  Primary ICD-10-CM: I10 
ICD-9-CM: 401.9 Type 2 diabetes mellitus without complication, without long-term current use of insulin (HCC)     ICD-10-CM: E11.9 ICD-9-CM: 250.00 Mixed hyperlipidemia     ICD-10-CM: E78.2 ICD-9-CM: 272.2 Cardiomyopathy, unspecified type ([facility] [address]     ICD-10-CM: I42.9 ICD-9-CM: 425.4 ASCVD (arteriosclerotic cardiovascular disease)     ICD-10-CM: I25.10 ICD-9-CM: 429.2, 440.9 Primary osteoarthritis involving multiple joints     ICD-10-CM: M15.0 ICD-9-CM: 715.09 Anxiety     ICD-10-CM: F41.9 ICD-9-CM: 300.00 Vitals BP Pulse Temp Resp Height(growth percentile) Weight(growth percentile) 124/82 60 97.3 °F (36.3 °C) (Oral) 14 4' 11\" (1.499 m) 115 lb 6.4 oz (52.3 kg) SpO2 BMI Smoking Status 97% 23.31 kg/m2 Never Smoker Vitals History BMI and BSA Data Body Mass Index Body Surface Area  
 23.31 kg/m 2 1.48 m 2 Preferred Pharmacy Pharmacy Name Phone [facility] [address] [place] [address] Dr [address], [address] [phone] Your Updated Medication List  
  
   
This list is accurate as of: 11/20/17  9:09 AM.  Always use your most recent med list.  
  
  
  
  
 aspirin 81 mg tablet Take 81 mg by mouth daily. CIALIS 20 mg tablet Generic drug:  tadalafil Take 20 mg by mouth as needed. CO Q-10 PO Take  by mouth.  
  
 metoprolol succinate 25 mg XL tablet Commonly known as:  TOPROL XL Take 1 Tab by mouth daily. * OTHER Take 7 Tabs by mouth daily. [address]  
  
 * OTHER Nutratherm fat-furner stimulant  
  
 polyethylene glycol 17 gram/dose powder Commonly known as:  Reginia Crazier  
as needed. ramipril 10 mg capsule Commonly known as:  ALTACE Take 10 mg by mouth daily. TRUETEST TEST STRIPS strip Generic drug:  glucose blood VI test strips  
by Does Not Apply route See Admin Instructions. * Notice: This list has 2 medication(s) that are the same as other medications prescribed for you. Read the directions carefully, and ask your doctor or other care provider to review them with you. Introducing [facility]! Jaciel Mc introduces [employer] patient portal. Now you can access parts of your medical record, email your doctor's office, and request medication refills online. 1. In your internet browser, go to [email or web address] 2. Click on the First Time User? Click Here link in the Sign In box. You will see the New Member Sign Up page. 3. Enter your [employer] Access Code exactly as it appears below. You will not need to use this code after youve completed the sign-up process. If you do not sign up before the expiration date, you must request a new code. · [employer] Access Code: [email or web address] Expires: 2/18/2018  8:14 AM 
 
4. Enter the last four digits of your Social Security Number (xxxx) and Date of Birth (mm/dd/yyyy) as indicated and click Submit. You will be taken to the next sign-up page. 5. Create a [employer] ID. This will be your [employer] login ID and cannot be changed, so think of one that is secure and easy to remember. 6. Create a [employer] password. You can change your password at any time. 7. Enter your Password Reset Question and Answer. This can be used at a later time if you forget your password. 8. Enter your e-mail address. You will receive e-mail notification when new information is available in [address]. 9. Click Sign Up. You can now view and download portions of your medical record. 10. Click the Download Summary menu link to download a portable copy of your medical information. If you have questions, please visit the Frequently Asked Questions section of the [employer] website. Remember, [employer] is NOT to be used for urgent needs. For medical emergencies, dial 911. Now available from your iPhone and Android! Please provide this summary of care documentation to your next provider. Your primary care clinician is listed as Henry. If you have any questions after today's visit, please call [phone].

## 2017-11-20 NOTE — PROGRESS NOTES
Chief Complaint   Patient presents with    Hypertension     3 month follow up    Diabetes     3 month follow up    Cholesterol Problem     3 month follow up       SUBJECTIVE:    Isaiah Lopez is a 65 y.o. male who returns in follow-up for his hypertension, diabetes, hyperlipidemia, ASCVD, cardiomyopathy, DJD, and anxiety. He seems to be doing well and take his medication on a regular basis as well as trying to follow his diet. He is getting some exercise. He denies any chest pain shortness breath cardiorespiratory points. He denies any GI/ complaints. He denies any neurologic complaints. There are no other complaints complete review of systems. Current Outpatient Prescriptions   Medication Sig Dispense Refill    polyethylene glycol (MIRALAX) 17 gram/dose powder as needed.  ramipril (ALTACE) 10 mg capsule Take 10 mg by mouth daily.  UBIDECARENONE (CO Q-10 PO) Take  by mouth.  OTHER Nutratherm fat-furner stimulant      glucose blood VI test strips (TRUETEST TEST STRIPS) strip by Does Not Apply route See Admin Instructions.  tadalafil (CIALIS) 20 mg tablet Take 20 mg by mouth as needed.  OTHER Take 7 Tabs by mouth daily. [facility]      metoprolol succinate (TOPROL XL) 25 mg XL tablet Take 1 Tab by mouth daily. 30 Tab 11    aspirin 81 mg tablet Take 81 mg by mouth daily.        Past Medical History:   Diagnosis Date    Anxiety 8/5/2017    Arrhythmia     Arthritis     Atherosclerotic heart disease of native coronary artery without angina pectoris 8/5/2017    Bradycardia 8/5/2017    CAD (coronary artery disease)     Constipation, chronic 8/5/2017    Diabetes ([address]     diet controlled    Diabetes mellitus ([address] 8/5/2017    Diverticulosis 8/5/2017    ED (erectile dysfunction) 8/5/2017    GERD (gastroesophageal reflux disease)     Hypertension     Hypogonadism male 8/5/2017    Kidney stone     Neuropathy 8/5/2017    On statin therapy 8/5/2017  Right foot pain 8/5/2017     Past Surgical History:   Procedure Laterality Date    CARDIAC SURG PROCEDURE UNLIST      cabg x4 vessels 1999    COLONOSCOPY N/A 6/21/2016    COL[facility] ENDOSCOPY    COLORECTAL SCRN; HI RISK IND  6/21/2016         HX GI      COLONOSCOPY    HX HEART CATHETERIZATION      HX OTHER SURGICAL      artificial testicle    HX PACEMAKER  10/6/15    TN COLONOSCOPY FLX DX W/COLLJ SPEC WHEN PFRMD  4/8/2011         UPPER GI ENDOSCOPY,BIOPSY  12/14/2016          Allergies   Allergen Reactions    Caffeine Unknown (comments)     Sweating AND WOOZY AND CAN'T SLEEP    Codeine Other (comments)     Lightheaded, WOOZY AND CAN'T SLEEP    Percocet [Oxycodone-Acetaminophen] Nausea and Vomiting       REVIEW OF SYSTEMS:  General: negative for - chills or fever, or weight loss or gain  ENT: negative for - headaches, nasal congestion or tinnitus  Eyes: no blurred or visual changes  Neck: No stiffness or swollen nodes  Respiratory: negative for - cough, hemoptysis, shortness of breath or wheezing  Cardiovascular : negative for - chest pain, edema, palpitations or shortness of breath  Gastrointestinal: negative for - abdominal pain, blood in stools, heartburn or nausea/vomiting  Genito-Urinary: no dysuria, trouble voiding, or hematuria  Musculoskeletal: negative for - gait disturbance, joint pain, joint stiffness or joint swelling  Neurological: no TIA or stroke symptoms  Hematologic: no bruises, no bleeding  Lymphatic: no swollen glands  Integument: no lumps, mole changes, nail changes or rash  Endocrine:no malaise/lethargy poly uria or polydipsia or unexpected weight changes        Social History     Social History    Marital status:      Spouse name: N/A    Number of children: N/A    Years of education: N/A     Social History Main Topics    Smoking status: Never Smoker    Smokeless tobacco: Never Used    Alcohol use No    Drug use: No    Sexual activity: Not Asked     Other Topics Concern    None     Social History Narrative     Family History   Problem Relation Age of Onset    Heart Disease Mother     No Known Problems Father     Cancer Sister     Diabetes Brother     Anesth Problems Neg Hx        OBJECTIVE:     Visit Vitals    /82    Pulse 60    Temp 97.3 °F (36.3 °C) (Oral)    Resp 14    Ht 4' 11\" (1.499 m)    Wt 115 lb 6.4 oz (52.3 kg)    SpO2 97%    BMI 23.31 kg/m2     CONSTITUTIONAL:   well nourished, appears age appropriate  EYES: sclera anicteric, PERRL, EOMI  ENMT:nars clear, moist mucous membranes, pharynx clear  NECK: supple. Thyroid normal, No JVD or bruits  RESPIRATORY: Chest: clear to ascultation and percussion, normal inspiratory effort  CARDIOVASCULAR: Heart: regular rate and rhythm no murmurs, rubs or gallops, PMI not displaced, No thrills  GASTROINTESTINAL: Abdomen: non distended, soft, non tender, bowel sounds normal  HEMATOLOGIC: no purpura, petechiae or bruising  LYMPHATIC: No lymph node enlargemant  MUSCULOSKELETAL: Extremities: no edema or active synovitis, pulse 1+   INTEGUMENT: No unusual rashes or suspicious skin lesions noted. Nails appear normal.  PERIPHERAL VASCULAR: normal pulses femoral, PT and DP  NEUROLOGIC: non-focal exam, A & O X 3  PSYCHIATRIC:, appropriate affect     ASSESSMENT:   1. Essential hypertension    2. Type 2 diabetes mellitus without complication, without long-term current use of insulin ([address]    3. Mixed hyperlipidemia    4. Cardiomyopathy, unspecified type ([address]    5. ASCVD (arteriosclerotic cardiovascular disease)    6. Primary osteoarthritis involving multiple joints    7. Anxiety      Impression  1. Hypertension that is controlled on recheck it was all that was up initially by the digital cuff the regular cuff was normal.  2.  Diabetes we will see what that status is make adjustments as necessary  3.   Hyperlipidemia I reviewed his prior labs repeat status pending will make adjustments as necessary  4. Cardiomyopathy seems to be stable  5. ASCVD clinically stable  6. DJD currently stable  7. Anxiety chronic but stable  I will call the lab make further recommendations adjustments as necessary. Follow stable continue same and follow-up in 3 months or sooner should be a problem. PLAN:  .  Orders Placed This Encounter    AMB POC URINE, MICROALBUMIN, SEMIQUANT (1 RESULT)    AMB POC LIPID PROFILE    AMB POC HEMOGLOBIN A1C    AMB POC COMPREHENSIVE METABOLIC PANEL    AMB POC CK (CPK)         ATTENTION:   This medical record was transcribed using an electronic medical records system. Although proofread, it may and can contain electronic and spelling errors. Other human spelling and other errors may be present. Corrections may be executed at a later time. Please feel free to contact us for any clarifications as needed. Follow-up Disposition:  Return in about 3 months (around 2/20/2018). No results found for any visits on 11/20/17. Bethanie Layne MD    The patient verbalized understanding of the problems and plans as explained.

## 2017-11-20 NOTE — TELEPHONE ENCOUNTER
Requested Prescriptions     Pending Prescriptions Disp Refills    Blood-Glucose Meter (TRUE METRIX GLUCOSE METER) misc 1 Each 0     Sig: Use daily as needed for diabetes management. Diagnosis E11.9    glucose blood VI test strips (TRUE METRIX GLUCOSE TEST STRIP) strip 50 Strip 5     Sig: Use 1 to 2 times daily as needed for Diabetes management. Diagnosis is 411.9.    glucose blood VI test strips (TRUETEST TEST STRIPS) strip       Sig: by Does Not Apply route See Admin Instructions.

## 2018-03-04 PROBLEM — M15.9 PRIMARY OSTEOARTHRITIS INVOLVING MULTIPLE JOINTS: Status: ACTIVE | Noted: 2017-01-03

## 2018-03-04 PROBLEM — E78.2 MIXED HYPERLIPIDEMIA: Status: ACTIVE | Noted: 2017-01-03

## 2018-03-04 PROBLEM — E11.42 CONTROLLED TYPE 2 DIABETES MELLITUS WITH DIABETIC POLYNEUROPATHY, WITHOUT LONG-TERM CURRENT USE OF INSULIN (HCC): Status: ACTIVE | Noted: 2017-08-05

## 2018-03-05 ENCOUNTER — OFFICE VISIT (OUTPATIENT)
Dept: INTERNAL MEDICINE CLINIC | Age: 78
End: 2018-03-05

## 2018-03-05 VITALS
SYSTOLIC BLOOD PRESSURE: 114 MMHG | RESPIRATION RATE: 16 BRPM | BODY MASS INDEX: 22.38 KG/M2 | HEIGHT: 60 IN | WEIGHT: 114 LBS | OXYGEN SATURATION: 98 % | TEMPERATURE: 98.1 F | HEART RATE: 60 BPM | DIASTOLIC BLOOD PRESSURE: 76 MMHG

## 2018-03-05 DIAGNOSIS — M15.9 PRIMARY OSTEOARTHRITIS INVOLVING MULTIPLE JOINTS: ICD-10-CM

## 2018-03-05 DIAGNOSIS — I25.10 ASCVD (ARTERIOSCLEROTIC CARDIOVASCULAR DISEASE): ICD-10-CM

## 2018-03-05 DIAGNOSIS — E11.42 CONTROLLED TYPE 2 DIABETES MELLITUS WITH DIABETIC POLYNEUROPATHY, WITHOUT LONG-TERM CURRENT USE OF INSULIN (HCC): ICD-10-CM

## 2018-03-05 DIAGNOSIS — R10.13 EPIGASTRIC PAIN: ICD-10-CM

## 2018-03-05 DIAGNOSIS — E78.2 MIXED HYPERLIPIDEMIA: ICD-10-CM

## 2018-03-05 DIAGNOSIS — I10 ESSENTIAL HYPERTENSION: Primary | ICD-10-CM

## 2018-03-05 DIAGNOSIS — I42.9 CARDIOMYOPATHY, UNSPECIFIED TYPE (HCC): ICD-10-CM

## 2018-03-05 LAB
ALBUMIN SERPL-MCNC: 4.3 G/DL (ref 3.9–5.4)
ALKALINE PHOS POC: 66 U/L (ref 38–126)
ALT SERPL-CCNC: 44 U/L (ref 9–52)
AMYLASE, POCT, AMYLPOCT: 113 U/L (ref 30–100)
AST SERPL-CCNC: 40 U/L (ref 14–36)
BUN BLD-MCNC: 16 MG/DL (ref 9–20)
CALCIUM BLD-MCNC: 10.2 MG/DL (ref 8.4–10.2)
CHLORIDE BLD-SCNC: 106 MMOL/L (ref 98–107)
CHOLEST SERPL-MCNC: 202 MG/DL (ref 0–200)
CK (CPK) POC: 143 U/L (ref 30–135)
CO2 POC: 29 MMOL/L (ref 22–32)
CREAT BLD-MCNC: 0.7 MG/DL (ref 0.8–1.5)
EGFR (POC): 90.4
GLUCOSE POC: 65 MG/DL (ref 75–110)
GRAN# POC: 4.3 K/UL (ref 2–7.8)
GRAN% POC: 58.5 % (ref 37–92)
HBA1C MFR BLD HPLC: 5.7 % (ref 4.5–5.7)
HCT VFR BLD CALC: 50.1 % (ref 37–51)
HDLC SERPL-MCNC: 69 MG/DL (ref 35–130)
HGB BLD-MCNC: 16.6 G/DL (ref 12–18)
LDL CHOLESTEROL POC: 120 MG/DL (ref 0–130)
LY# POC: 2.6 K/UL (ref 0.6–4.1)
LY% POC: 36 % (ref 10–58.5)
MCH RBC QN: 32.7 PG (ref 26–32)
MCHC RBC-ENTMCNC: 33.2 G/DL (ref 30–36)
MCV RBC: 98 FL (ref 80–97)
MID #, POC: 0.3 K/UL (ref 0–1.8)
MID% POC: 5.5 % (ref 0.1–24)
PLATELET # BLD: 191 K/UL (ref 140–440)
POTASSIUM SERPL-SCNC: 5.1 MMOL/L (ref 3.6–5)
PROT SERPL-MCNC: 7.6 G/DL (ref 6.3–8.2)
RBC # BLD: 5.09 M/UL (ref 4.2–6.3)
SODIUM SERPL-SCNC: 140 MMOL/L (ref 137–145)
TCHOL/HDL RATIO (POC): 2.9 (ref 0–4)
TOTAL BILIRUBIN POC: 1.3 MG/DL (ref 0.2–1.3)
TRIGL SERPL-MCNC: 65 MG/DL (ref 0–200)
VLDLC SERPL CALC-MCNC: 13 MG/DL
WBC # BLD: 7.2 K/UL (ref 4.1–10.9)

## 2018-03-05 NOTE — MR AVS SNAPSHOT
[address] [facility] [address] [address] [phone] Patient: Niki Riley MRN: [id number]:[id number] Visit Information Date & Time Provider Department Dept. Phone Encounter #  
 3/5/2018  8:40 AM Sol Crystal MD [place] [id number] [phone] [id number] Upcoming Health Maintenance Date Due  
 EYE EXAM RETINAL OR DILATED Q1 7/1/1949 DTaP/Tdap/Td series (1 - Tdap) 7/1/1960 ZOSTER VACCINE AGE 60> 5/1/1999 GLAUCOMA SCREENING Q2Y 7/1/2004 HEMOGLOBIN A1C Q6M 5/20/2018 FOOT EXAM Q1 8/7/2018 MEDICARE YEARLY EXAM 8/8/2018 MICROALBUMIN Q1 11/20/2018 LIPID PANEL Q1 11/20/2018 COLONOSCOPY 6/21/2021 Allergies as of 3/5/2018  Review Complete On: 3/5/2018 By: Little Garcias LPN Severity Noted Reaction Type Reactions Caffeine  04/08/2011    Unknown (comments) Sweating AND WOOZY AND CAN'T SLEEP Codeine  04/07/2011    Other (comments) Lightheaded, WOOZY AND CAN'T SLEEP Percocet [Oxycodone-acetaminophen]  10/28/2015    Nausea and Vomiting Current Immunizations  Reviewed on 4/6/2016 Name Date Influenza High Dose Vaccine PF 10/24/2017 Influenza Vaccine 10/1/2016, 12/16/2015, 11/10/2014 Influenza Vaccine (Quad) PF 10/7/2015  8:47 AM  
 Pneumococcal Conjugate (PCV-13) 9/14/2015 Pneumococcal Vaccine (Unspecified Type) 10/17/2011 Not reviewed this visit Vitals BP Pulse Temp Resp Height(growth percentile) Weight(growth percentile) 114/76 (BP 1 Location: Left arm, BP Patient Position: Sitting) 60 98.1 °F (36.7 °C) (Oral) 16 4' 11\" (1.499 m) 114 lb (51.7 kg) SpO2 BMI Smoking Status 98% 23.03 kg/m2 Never Smoker Vitals History BMI and BSA Data Body Mass Index Body Surface Area 23.03 kg/m 2 1.47 m 2 Preferred Pharmacy Pharmacy Name Phone [facility] [address] [place] [address] [phone] Your Updated Medication List  
  
   
This list is accurate as of 3/5/18  9:27 AM.  Always use your most recent med list.  
  
  
  
  
 aspirin 81 mg tablet Take 81 mg by mouth daily. Blood-Glucose Meter Misc Commonly known as:  TRUE METRIX GLUCOSE METER Use daily as needed for diabetes management. Diagnosis E11.9 CIALIS 20 mg tablet Generic drug:  tadalafil Take 20 mg by mouth as needed. CO Q-10 PO Take  by mouth. * glucose blood VI test strips strip Commonly known as:  TRUE METRIX GLUCOSE TEST STRIP Use 1 to 2 times daily as needed for Diabetes management. Diagnosis is 411.9.  
  
 * glucose blood VI test strips strip Commonly known as:  TRUETEST TEST STRIPS  
by Does Not Apply route See Admin Instructions. metoprolol succinate 25 mg XL tablet Commonly known as:  TOPROL XL Take 1 Tab by mouth daily. * OTHER Take 7 Tabs by mouth daily. [address]  
  
 * OTHER Nutratherm fat-furner stimulant  
  
 polyethylene glycol 17 gram/dose powder Commonly known as:  Mevelyn Mall  
as needed. ramipril 10 mg capsule Commonly known as:  ALTACE Take 10 mg by mouth daily. * Notice: This list has 4 medication(s) that are the same as other medications prescribed for you. Read the directions carefully, and ask your doctor or other care provider to review them with you. Introducing [facility]! Drake Mayfield introduces [employer] patient portal. Now you can access parts of your medical record, email your doctor's office, and request medication refills online. 1. In your internet browser, go to [email or web address] 2. Click on the First Time User? Click Here link in the Sign In box. You will see the New Member Sign Up page. 3. Enter your [employer] Access Code exactly as it appears below.  You will not need to use this code after youve completed the sign-up process. If you do not sign up before the expiration date, you must request a new code. · [employer] Access Code: [email or web address] Expires: 6/3/2018  8:33 AM 
 
4. Enter the last four digits of your Social Security Number (xxxx) and Date of Birth (mm/dd/yyyy) as indicated and click Submit. You will be taken to the next sign-up page. 5. Create a [employer] ID. This will be your [employer] login ID and cannot be changed, so think of one that is secure and easy to remember. 6. Create a Gear[employer] password. You can change your password at any time. 7. Enter your Password Reset Question and Answer. This can be used at a later time if you forget your password. 8. Enter your e-mail address. You will receive e-mail notification when new information is available in [address]. 9. Click Sign Up. You can now view and download portions of your medical record. 10. Click the Download Summary menu link to download a portable copy of your medical information. If you have questions, please visit the Frequently Asked Questions section of the [employer] website. Remember, [employer] is NOT to be used for urgent needs. For medical emergencies, dial 911. Now available from your iPhone and Android! Please provide this summary of care documentation to your next provider. Your primary care clinician is listed as Henry. If you have any questions after today's visit, please call [phone].

## 2018-03-05 NOTE — PROGRESS NOTES
1. Have you been to the ER, urgent care clinic since your last visit? Hospitalized since your last visit? No    2. Have you seen or consulted any other health care providers outside of the [address] [place] [address] since your last visit? Include any pap smears or colon screening. No     Chief Complaint   Patient presents with    Hypertension     3 mo. f/u    Diabetes     3 mo. f/u    Cholesterol Problem     3 mo. f/u       Fasting      Eye exam was done June, 2017 by Dr. Amber Stephens.

## 2018-03-05 NOTE — PATIENT INSTRUCTIONS
Arthritis: Care Instructions  Your Care Instructions  Arthritis, also called osteoarthritis, is a breakdown of the cartilage that cushions your joints. When the cartilage wears down, your bones rub against each other. This causes pain and stiffness. Many people have some arthritis as they age. Arthritis most often affects the joints of the spine, hands, hips, knees, or feet. You can take simple measures to protect your joints, ease your pain, and help you stay active. Follow-up care is a key part of your treatment and safety. Be sure to make and go to all appointments, and call your doctor if you are having problems. It's also a good idea to know your test results and keep a list of the medicines you take. How can you care for yourself at home? · Stay at a healthy weight. Being overweight puts extra strain on your joints. · Talk to your doctor or physical therapist about exercises that will help ease joint pain. ¨ Stretch. You may enjoy gentle forms of yoga to help keep your joints and muscles flexible. ¨ Walk instead of jog. Other types of exercise that are less stressful on the joints include riding a bicycle, swimming, barbara chi, or water exercise. ¨ Lift weights. Strong muscles help reduce stress on your joints. Stronger thigh muscles, for example, take some of the stress off of the knees and hips. Learn the right way to lift weights so you do not make joint pain worse. · Take your medicines exactly as prescribed. Call your doctor if you think you are having a problem with your medicine. · Take pain medicines exactly as directed. ¨ If the doctor gave you a prescription medicine for pain, take it as prescribed. ¨ If you are not taking a prescription pain medicine, ask your doctor if you can take an over-the-counter medicine. · Use a cane, crutch, walker, or another device if you need help to get around. These can help rest your joints.  You also can use other things to make life easier, such as a higher toilet seat and padded handles on kitchen utensils. · Do not sit in low chairs, which can make it hard to get up. · Put heat or cold on your sore joints as needed. Use whichever helps you most. You also can take turns with hot and cold packs. ¨ Apply heat 2 or 3 times a day for 20 to 30 minutes-using a heating pad, hot shower, or hot pack-to relieve pain and stiffness. ¨ Put ice or a cold pack on your sore joint for 10 to 20 minutes at a time. Put a thin cloth between the ice and your skin. When should you call for help? Call your doctor now or seek immediate medical care if:  ? · You have sudden swelling, warmth, or pain in any joint. ? · You have joint pain and a fever or rash. ? · You have such bad pain that you cannot use a joint. ? Watch closely for changes in your health, and be sure to contact your doctor if:  ? · You have mild joint symptoms that continue even with more than 6 weeks of care at home. ? · You have stomach pain or other problems with your medicine. Where can you learn more? Go to http://estrella-david.info/. Enter S139 in the search box to learn more about \"Arthritis: Care Instructions. \"  Current as of: October 31, 2016  Content Version: 11.4  © 7485-1328 [employer]. Care instructions adapted under license by [employer] (which disclaims liability or warranty for this information). If you have questions about a medical condition or this instruction, always ask your healthcare professional. [place] [address] any warranty or liability for your use of this information.

## 2018-03-05 NOTE — PROGRESS NOTES
Chief Complaint   Patient presents with    Hypertension     3 mo. f/u    Diabetes     3 mo. f/u    Cholesterol Problem     3 mo. f/u       SUBJECTIVE:    Ezra Samano is a 65 y.o. male who returns in follow-up of his medical problems include hypertension, diabetes, hyperlipidemia, DJD, ASCVD, cardiomyopathy, and anxiety. He has a new problem today of complaints of upper abdominal discomfort more in the midepigastric area. He actually did complain that this was performed referred for GI evaluation by Dr. Danilo Loera and the EGD was unremarkable. He does not remember have an ultrasound at that point. He notes that she feels better he can throw up but he has a hard time throwing up. It is an intermittent discomfort. He denies any change in bowel habits. There are no other GI complaints and he has no  complaints. He denies any chest pain, shortness of breath, palpitations or cardiorespiratory complaints. He denies any headaches or neurologic complaints. There are no arthritic complaints and no other complaints on complete review of systems. He is trying to take his medicine regularly as well as follow his diet and get exercise. Current Outpatient Prescriptions   Medication Sig Dispense Refill    Blood-Glucose Meter (TRUE METRIX GLUCOSE METER) misc Use daily as needed for diabetes management. Diagnosis E11.9 1 Each 0    glucose blood VI test strips (TRUE METRIX GLUCOSE TEST STRIP) strip Use 1 to 2 times daily as needed for Diabetes management. Diagnosis is 411.9. 50 Strip 5    glucose blood VI test strips (TRUETEST TEST STRIPS) strip by Does Not Apply route See Admin Instructions. 100 Strip prn    polyethylene glycol (MIRALAX) 17 gram/dose powder as needed.  ramipril (ALTACE) 10 mg capsule Take 10 mg by mouth daily.  UBIDECARENONE (CO Q-10 PO) Take  by mouth.  OTHER Nutratherm fat-furner stimulant      tadalafil (CIALIS) 20 mg tablet Take 20 mg by mouth as needed.       OTHER Take 7 Tabs by mouth daily. [facility] [employer]      metoprolol succinate (TOPROL XL) 25 mg XL tablet Take 1 Tab by mouth daily. 30 Tab 11    aspirin 81 mg tablet Take 81 mg by mouth daily.        Past Medical History:   Diagnosis Date    Anxiety 8/5/2017    Arrhythmia     Arthritis     Atherosclerotic heart disease of native coronary artery without angina pectoris 8/5/2017    Bradycardia 8/5/2017    CAD (coronary artery disease)     Constipation, chronic 8/5/2017    Diabetes ([facility]     diet controlled    Diabetes mellitus ([facility] 8/5/2017    Diverticulosis 8/5/2017    ED (erectile dysfunction) 8/5/2017    GERD (gastroesophageal reflux disease)     Hypertension     Hypogonadism male 8/5/2017    Kidney stone     Neuropathy 8/5/2017    On statin therapy 8/5/2017    Right foot pain 8/5/2017     Past Surgical History:   Procedure Laterality Date    CARDIAC SURG PROCEDURE UNLIST      cabg x4 vessels 1999    COLONOSCOPY N/A 6/21/2016    COLONOSCOPY performed by Bhavna Turner MD at [facility]; HI RISK IND  6/21/2016         HX GI      COLONOSCOPY    HX HEART CATHETERIZATION      HX OTHER SURGICAL      artificial testicle    HX PACEMAKER  10/6/15    [place] COLONOSCOPY FLX DX W/COLLJ SPEC WHEN PFRMD  4/8/2011         UPPER GI ENDOSCOPY,BIOPSY  12/14/2016          Allergies   Allergen Reactions    Caffeine Unknown (comments)     Sweating AND WOOZY AND CAN'T SLEEP    Codeine Other (comments)     Lightheaded, WOOZY AND CAN'T SLEEP    Percocet [Oxycodone-Acetaminophen] Nausea and Vomiting       REVIEW OF SYSTEMS:  General: negative for - chills or fever, or weight loss or gain  ENT: negative for - headaches, nasal congestion or tinnitus  Eyes: no blurred or visual changes  Neck: No stiffness or swollen nodes  Respiratory: negative for - cough, hemoptysis, shortness of breath or wheezing  Cardiovascular : negative for - chest pain, edema, palpitations or shortness of breath  Gastrointestinal: Positive for upper abdominal pain is noted. Negative for -  blood in stools, heartburn or nausea/vomiting  Genito-Urinary: no dysuria, trouble voiding, or hematuria  Musculoskeletal: negative for - gait disturbance, joint pain, joint stiffness or joint swelling  Neurological: no TIA or stroke symptoms  Hematologic: no bruises, no bleeding  Lymphatic: no swollen glands  Integument: no lumps, mole changes, nail changes or rash  Endocrine:no malaise/lethargy poly uria or polydipsia or unexpected weight changes        Social History     Social History    Marital status:      Spouse name: N/A    Number of children: N/A    Years of education: N/A     Social History Main Topics    Smoking status: Never Smoker    Smokeless tobacco: Never Used    Alcohol use No    Drug use: No    Sexual activity: Not Asked     Other Topics Concern    None     Social History Narrative     Family History   Problem Relation Age of Onset    Heart Disease Mother     No Known Problems Father     Cancer Sister     Diabetes Brother     Anesth Problems Neg Hx        OBJECTIVE:     Visit Vitals    /76 (BP 1 Location: Left arm, BP Patient Position: Sitting)    Pulse 60    Temp 98.1 °F (36.7 °C) (Oral)    Resp 16    Ht 4' 11\" (1.499 m)    Wt 114 lb (51.7 kg)    SpO2 98%    BMI 23.03 kg/m2     CONSTITUTIONAL:   well nourished, appears age appropriate  EYES: sclera anicteric, PERRL, EOMI  ENMT:nares clear, moist mucous membranes, pharynx clear  NECK: supple.  Thyroid normal, No JVD or bruits  RESPIRATORY: Chest: clear to ascultation and percussion, normal inspiratory effort  CARDIOVASCULAR: Heart: regular rate and rhythm no murmurs, rubs or gallops, PMI not displaced, No thrills  GASTROINTESTINAL: Abdomen: non distended, soft, non tender, bowel sounds normal  HEMATOLOGIC: no purpura, petechiae or bruising  LYMPHATIC: No lymph node enlargemant  MUSCULOSKELETAL: Extremities: no edema or active synovitis, pulse 1+. No diabetic foot changes noted  INTEGUMENT: No unusual rashes or suspicious skin lesions noted. Nails appear normal.  PERIPHERAL VASCULAR: normal pulses femoral, PT and DP  NEUROLOGIC: non-focal exam, A & O X 3. Normal distal sensation and proprioception all toes both feet. PSYCHIATRIC:, appropriate affect     ASSESSMENT:   1. Essential hypertension    2. Controlled type 2 diabetes mellitus with diabetic polyneuropathy, without long-term current use of insulin ([address]    3. Mixed hyperlipidemia    4. Primary osteoarthritis involving multiple joints    5. ASCVD (arteriosclerotic cardiovascular disease)    6. Cardiomyopathy, unspecified type ([address]    7. Epigastric pain      Impression  1. Hypertension that is controlled continue current therapy reviewed with him  2. Diabetes status pending reviewed prior labs will make adjustments if necessary  3. Hyperlipidemia prior labs reviewed repeat status pending will adjust if needed  4. DJD that seems to be stable  5. ASCVD clinically stable continue current aspirin daily  6. Cardiomyopathy stable  7. Epigastric discomfort unclear etiology at this point I will schedule ultrasound to further evaluate + check an amylase along with a CBC and his regular liver enzymes. He previously had EGD as noted. I will recheck him of the ultrasound and will decide upon any further therapy. Follow-up schedule III months was other medical problems and I will call the lab results and make adjustments if necessary. I will see him sooner should there be a problem. PLAN:  .  Orders Placed This Encounter    US ABD COMP    AMB POC LIPID PROFILE    AMB POC HEMOGLOBIN A1C    AMB POC COMPREHENSIVE METABOLIC PANEL    AMB POC CK (CPK)    AMB POC AMYLASE    AMB POC COMPLETE CBC,AUTOMATED ENTER         ATTENTION:   This medical record was transcribed using an electronic medical records system.   Although proofread, it may and can contain electronic and spelling errors. Other human spelling and other errors may be present. Corrections may be executed at a later time. Please feel free to contact us for any clarifications as needed. Follow-up Disposition:  Return in about 3 months (around 6/5/2018). No results found for any visits on 03/05/18. Luisa Do MD    The patient verbalized understanding of the problems and plans as explained.

## 2018-03-11 ENCOUNTER — HOSPITAL ENCOUNTER (EMERGENCY)
Age: 78
Discharge: HOME | End: 2018-03-12
Attending: EMERGENCY MEDICINE | Admitting: EMERGENCY MEDICINE
Payer: MEDICARE

## 2018-03-11 ENCOUNTER — APPOINTMENT (OUTPATIENT)
Dept: CT IMAGING | Age: 78
End: 2018-03-11
Attending: EMERGENCY MEDICINE
Payer: MEDICARE

## 2018-03-11 VITALS
BODY MASS INDEX: 22.29 KG/M2 | DIASTOLIC BLOOD PRESSURE: 92 MMHG | HEIGHT: 60 IN | TEMPERATURE: 98.3 F | WEIGHT: 113.54 LBS | OXYGEN SATURATION: 98 % | SYSTOLIC BLOOD PRESSURE: 113 MMHG | HEART RATE: 79 BPM | RESPIRATION RATE: 16 BRPM

## 2018-03-11 DIAGNOSIS — G89.29 CHRONIC ABDOMINAL PAIN: ICD-10-CM

## 2018-03-11 DIAGNOSIS — R11.2 NON-INTRACTABLE VOMITING WITH NAUSEA, UNSPECIFIED VOMITING TYPE: Primary | ICD-10-CM

## 2018-03-11 DIAGNOSIS — R10.9 CHRONIC ABDOMINAL PAIN: ICD-10-CM

## 2018-03-11 LAB
ALBUMIN SERPL-MCNC: 3.5 G/DL (ref 3.5–5)
ALBUMIN/GLOB SERPL: 0.9 {RATIO} (ref 1.1–2.2)
ALP SERPL-CCNC: 77 U/L (ref 45–117)
ALT SERPL-CCNC: 31 U/L (ref 12–78)
ANION GAP SERPL CALC-SCNC: 8 MMOL/L (ref 5–15)
APPEARANCE UR: CLEAR
AST SERPL-CCNC: 25 U/L (ref 15–37)
BACTERIA URNS QL MICRO: NEGATIVE /HPF
BASOPHILS # BLD: 0 K/UL (ref 0–0.1)
BASOPHILS NFR BLD: 0 % (ref 0–1)
BILIRUB SERPL-MCNC: 0.8 MG/DL (ref 0.2–1)
BILIRUB UR QL: NEGATIVE
BUN SERPL-MCNC: 23 MG/DL (ref 6–20)
BUN/CREAT SERPL: 26 (ref 12–20)
CALCIUM SERPL-MCNC: 9.5 MG/DL (ref 8.5–10.1)
CHLORIDE SERPL-SCNC: 105 MMOL/L (ref 97–108)
CK MB CFR SERPL CALC: NORMAL % (ref 0–2.5)
CK MB SERPL-MCNC: <1 NG/ML (ref 5–25)
CK SERPL-CCNC: 76 U/L (ref 39–308)
CO2 SERPL-SCNC: 26 MMOL/L (ref 21–32)
COLOR UR: ABNORMAL
CREAT SERPL-MCNC: 0.89 MG/DL (ref 0.7–1.3)
DIFFERENTIAL METHOD BLD: ABNORMAL
EOSINOPHIL # BLD: 0 K/UL (ref 0–0.4)
EOSINOPHIL NFR BLD: 0 % (ref 0–7)
EPITH CASTS URNS QL MICRO: ABNORMAL /LPF
ERYTHROCYTE [DISTWIDTH] IN BLOOD BY AUTOMATED COUNT: 14.2 % (ref 11.5–14.5)
GLOBULIN SER CALC-MCNC: 3.8 G/DL (ref 2–4)
GLUCOSE SERPL-MCNC: 135 MG/DL (ref 65–100)
GLUCOSE UR STRIP.AUTO-MCNC: NEGATIVE MG/DL
HCT VFR BLD AUTO: 46.3 % (ref 36.6–50.3)
HGB BLD-MCNC: 15.7 G/DL (ref 12.1–17)
HGB UR QL STRIP: NEGATIVE
HYALINE CASTS URNS QL MICRO: ABNORMAL /LPF (ref 0–5)
IMM GRANULOCYTES # BLD: 0 K/UL (ref 0–0.04)
IMM GRANULOCYTES NFR BLD AUTO: 0 % (ref 0–0.5)
KETONES UR QL STRIP.AUTO: ABNORMAL MG/DL
LEUKOCYTE ESTERASE UR QL STRIP.AUTO: NEGATIVE
LIPASE SERPL-CCNC: 58 U/L (ref 73–393)
LYMPHOCYTES # BLD: 0.4 K/UL (ref 0.8–3.5)
LYMPHOCYTES NFR BLD: 4 % (ref 12–49)
MCH RBC QN AUTO: 32.2 PG (ref 26–34)
MCHC RBC AUTO-ENTMCNC: 33.9 G/DL (ref 30–36.5)
MCV RBC AUTO: 94.9 FL (ref 80–99)
MONOCYTES # BLD: 0.6 K/UL (ref 0–1)
MONOCYTES NFR BLD: 6 % (ref 5–13)
NEUTS SEG # BLD: 9.2 K/UL (ref 1.8–8)
NEUTS SEG NFR BLD: 90 % (ref 32–75)
NITRITE UR QL STRIP.AUTO: NEGATIVE
NRBC # BLD: 0 K/UL (ref 0–0.01)
NRBC BLD-RTO: 0 PER 100 WBC
PH UR STRIP: 5 [PH] (ref 5–8)
PLATELET # BLD AUTO: 166 K/UL (ref 150–400)
PMV BLD AUTO: 9.8 FL (ref 8.9–12.9)
POTASSIUM SERPL-SCNC: 4.1 MMOL/L (ref 3.5–5.1)
PROT SERPL-MCNC: 7.3 G/DL (ref 6.4–8.2)
PROT UR STRIP-MCNC: ABNORMAL MG/DL
RBC # BLD AUTO: 4.88 M/UL (ref 4.1–5.7)
RBC #/AREA URNS HPF: ABNORMAL /HPF (ref 0–5)
RBC MORPH BLD: ABNORMAL
SODIUM SERPL-SCNC: 139 MMOL/L (ref 136–145)
SP GR UR REFRACTOMETRY: 1.02 (ref 1–1.03)
TROPONIN I SERPL-MCNC: <0.04 NG/ML
UA: UC IF INDICATED,UAUC: ABNORMAL
UROBILINOGEN UR QL STRIP.AUTO: 0.2 EU/DL (ref 0.2–1)
WBC # BLD AUTO: 10.2 K/UL (ref 4.1–11.1)
WBC URNS QL MICRO: ABNORMAL /HPF (ref 0–4)

## 2018-03-11 PROCEDURE — 80053 COMPREHEN METABOLIC PANEL: CPT | Performed by: PHYSICIAN ASSISTANT

## 2018-03-11 PROCEDURE — 83690 ASSAY OF LIPASE: CPT | Performed by: PHYSICIAN ASSISTANT

## 2018-03-11 PROCEDURE — 81001 URINALYSIS AUTO W/SCOPE: CPT | Performed by: EMERGENCY MEDICINE

## 2018-03-11 PROCEDURE — 36415 COLL VENOUS BLD VENIPUNCTURE: CPT | Performed by: PHYSICIAN ASSISTANT

## 2018-03-11 PROCEDURE — 74177 CT ABD & PELVIS W/CONTRAST: CPT

## 2018-03-11 PROCEDURE — 84484 ASSAY OF TROPONIN QUANT: CPT | Performed by: EMERGENCY MEDICINE

## 2018-03-11 PROCEDURE — 82550 ASSAY OF CK (CPK): CPT | Performed by: EMERGENCY MEDICINE

## 2018-03-11 PROCEDURE — 96361 HYDRATE IV INFUSION ADD-ON: CPT

## 2018-03-11 PROCEDURE — 85025 COMPLETE CBC W/AUTO DIFF WBC: CPT | Performed by: PHYSICIAN ASSISTANT

## 2018-03-11 PROCEDURE — 74011636320 HC RX REV CODE- 636/320: Performed by: EMERGENCY MEDICINE

## 2018-03-11 PROCEDURE — 99284 EMERGENCY DEPT VISIT MOD MDM: CPT

## 2018-03-11 PROCEDURE — 93005 ELECTROCARDIOGRAM TRACING: CPT

## 2018-03-11 PROCEDURE — 74011250636 HC RX REV CODE- 250/636: Performed by: EMERGENCY MEDICINE

## 2018-03-11 PROCEDURE — 96374 THER/PROPH/DIAG INJ IV PUSH: CPT

## 2018-03-11 RX ORDER — ONDANSETRON 2 MG/ML
4 INJECTION INTRAMUSCULAR; INTRAVENOUS
Status: COMPLETED | OUTPATIENT
Start: 2018-03-11 | End: 2018-03-11

## 2018-03-11 RX ORDER — SODIUM CHLORIDE 9 MG/ML
50 INJECTION, SOLUTION INTRAVENOUS
Status: COMPLETED | OUTPATIENT
Start: 2018-03-11 | End: 2018-03-12

## 2018-03-11 RX ORDER — PANTOPRAZOLE SODIUM 40 MG/1
40 TABLET, DELAYED RELEASE ORAL DAILY
Qty: 20 TAB | Refills: 0 | Status: SHIPPED | OUTPATIENT
Start: 2018-03-11 | End: 2018-03-31

## 2018-03-11 RX ORDER — ONDANSETRON 4 MG/1
4 TABLET, ORALLY DISINTEGRATING ORAL
Qty: 10 TAB | Refills: 0 | Status: SHIPPED | OUTPATIENT
Start: 2018-03-11 | End: 2018-11-19 | Stop reason: ALTCHOICE

## 2018-03-11 RX ORDER — SODIUM CHLORIDE 0.9 % (FLUSH) 0.9 %
10 SYRINGE (ML) INJECTION
Status: COMPLETED | OUTPATIENT
Start: 2018-03-11 | End: 2018-03-11

## 2018-03-11 RX ADMIN — SODIUM CHLORIDE 50 ML/HR: 900 INJECTION, SOLUTION INTRAVENOUS at 23:00

## 2018-03-11 RX ADMIN — IOPAMIDOL 100 ML: 755 INJECTION, SOLUTION INTRAVENOUS at 23:00

## 2018-03-11 RX ADMIN — ONDANSETRON 4 MG: 2 INJECTION INTRAMUSCULAR; INTRAVENOUS at 21:37

## 2018-03-11 RX ADMIN — SODIUM CHLORIDE 1000 ML: 900 INJECTION, SOLUTION INTRAVENOUS at 21:36

## 2018-03-11 RX ADMIN — Medication 10 ML: at 23:00

## 2018-03-12 ENCOUNTER — HOSPITAL ENCOUNTER (OUTPATIENT)
Dept: ULTRASOUND IMAGING | Age: 78
Discharge: HOME | End: 2018-03-12
Attending: INTERNAL MEDICINE
Payer: MEDICARE

## 2018-03-12 DIAGNOSIS — R10.13 EPIGASTRIC PAIN: ICD-10-CM

## 2018-03-12 LAB
ATRIAL RATE: 79 BPM
CALCULATED P AXIS, ECG09: 48 DEGREES
CALCULATED R AXIS, ECG10: -106 DEGREES
CALCULATED T AXIS, ECG11: 74 DEGREES
DIAGNOSIS, 93000: NORMAL
P-R INTERVAL, ECG05: 180 MS
Q-T INTERVAL, ECG07: 412 MS
QRS DURATION, ECG06: 146 MS
QTC CALCULATION (BEZET), ECG08: 472 MS
VENTRICULAR RATE, ECG03: 79 BPM

## 2018-03-12 PROCEDURE — 76700 US EXAM ABDOM COMPLETE: CPT

## 2018-03-12 NOTE — DISCHARGE INSTRUCTIONS
Nausea and Vomiting: Care Instructions  Your Care Instructions    When you are nauseated, you may feel weak and sweaty and notice a lot of saliva in your mouth. Nausea often leads to vomiting. Most of the time you do not need to worry about nausea and vomiting, but they can be signs of other illnesses. Two common causes of nausea and vomiting are stomach flu and food poisoning. Nausea and vomiting from viral stomach flu will usually start to improve within 24 hours. Nausea and vomiting from food poisoning may last from 12 to 48 hours. The doctor has checked you carefully, but problems can develop later. If you notice any problems or new symptoms, get medical treatment right away. Follow-up care is a key part of your treatment and safety. Be sure to make and go to all appointments, and call your doctor if you are having problems. It's also a good idea to know your test results and keep a list of the medicines you take. How can you care for yourself at home? · To prevent dehydration, drink plenty of fluids, enough so that your urine is light yellow or clear like water. Choose water and other caffeine-free clear liquids until you feel better. If you have kidney, heart, or liver disease and have to limit fluids, talk with your doctor before you increase the amount of fluids you drink. · Rest in bed until you feel better. · When you are able to eat, try clear soups, mild foods, and liquids until all symptoms are gone for 12 to 48 hours. Other good choices include dry toast, crackers, cooked cereal, and gelatin dessert, such as Jell-O. When should you call for help? Call 911 anytime you think you may need emergency care. For example, call if:  ? · You passed out (lost consciousness). ?Call your doctor now or seek immediate medical care if:  ? · You have symptoms of dehydration, such as:  ¨ Dry eyes and a dry mouth. ¨ Passing only a little dark urine.   ¨ Feeling thirstier than usual.   ? · You have new or worsening belly pain. ? · You have a new or higher fever. ? · You vomit blood or what looks like coffee grounds. ? Watch closely for changes in your health, and be sure to contact your doctor if:  ? · You have ongoing nausea and vomiting. ? · Your vomiting is getting worse. ? · Your vomiting lasts longer than 2 days. ? · You are not getting better as expected. Where can you learn more? Go to http://estrella-david.info/. Enter 25 [id number] in the search box to learn more about \"Nausea and Vomiting: Care Instructions. \"  Current as of: March 20, 2017  Content Version: 11.4  © 1921-4428 [employer]. Care instructions adapted under license by [employer] (which disclaims liability or warranty for this information). If you have questions about a medical condition or this instruction, always ask your healthcare professional. [place] [address] any warranty or liability for your use of this information. Abdominal Pain: Care Instructions  Your Care Instructions    Abdominal pain has many possible causes. Some aren't serious and get better on their own in a few days. Others need more testing and treatment. If your pain continues or gets worse, you need to be rechecked and may need more tests to find out what is wrong. You may need surgery to correct the problem. Don't ignore new symptoms, such as fever, nausea and vomiting, urination problems, pain that gets worse, and dizziness. These may be signs of a more serious problem. Your doctor may have recommended a follow-up visit in the next 8 to 12 hours. If you are not getting better, you may need more tests or treatment. The doctor has checked you carefully, but problems can develop later. If you notice any problems or new symptoms, get medical treatment right away. Follow-up care is a key part of your treatment and safety.  Be sure to make and go to all appointments, and call your doctor if you are having problems. It's also a good idea to know your test results and keep a list of the medicines you take. How can you care for yourself at home? · Rest until you feel better. · To prevent dehydration, drink plenty of fluids, enough so that your urine is light yellow or clear like water. Choose water and other caffeine-free clear liquids until you feel better. If you have kidney, heart, or liver disease and have to limit fluids, talk with your doctor before you increase the amount of fluids you drink. · If your stomach is upset, eat mild foods, such as rice, dry toast or crackers, bananas, and applesauce. Try eating several small meals instead of two or three large ones. · Wait until 48 hours after all symptoms have gone away before you have spicy foods, alcohol, and drinks that contain caffeine. · Do not eat foods that are high in fat. · Avoid anti-inflammatory medicines such as aspirin, ibuprofen (Advil, Motrin), and naproxen (Aleve). These can cause stomach upset. Talk to your doctor if you take daily aspirin for another health problem. When should you call for help? Call 911 anytime you think you may need emergency care. For example, call if:  ? · You passed out (lost consciousness). ? · You pass maroon or very bloody stools. ? · You vomit blood or what looks like coffee grounds. ? · You have new, severe belly pain. ?Call your doctor now or seek immediate medical care if:  ? · Your pain gets worse, especially if it becomes focused in one area of your belly. ? · You have a new or higher fever. ? · Your stools are black and look like tar, or they have streaks of blood. ? · You have unexpected vaginal bleeding. ? · You have symptoms of a urinary tract infection. These may include:  ¨ Pain when you urinate. ¨ Urinating more often than usual.  ¨ Blood in your urine. ? · You are dizzy or lightheaded, or you feel like you may faint. ? Watch closely for changes in your health, and be sure to contact your doctor if:  ? · You are not getting better after 1 day (24 hours). Where can you learn more? Go to http://estrella-david.info/. Enter K189 in the search box to learn more about \"Abdominal Pain: Care Instructions. \"  Current as of: March 20, 2017  Content Version: 11.4  © 7861-5675 [employer]. Care instructions adapted under license by [email or web address] (which disclaims liability or warranty for this information). If you have questions about a medical condition or this instruction, always ask your healthcare professional. [address] any warranty or liability for your use of this information. Chronic Pain: Care Instructions  Your Care Instructions    Chronic pain is pain that lasts a long time (months or even years) and may or may not have a clear cause. It is different from acute pain, which usually does have a clear cause-like an injury or illness-and gets better over time. Chronic pain:  · Lasts over time but may vary from day to day. · Does not go away despite efforts to end it. · May disrupt your sleep and lead to fatigue. · May cause depression or anxiety. · May make your muscles tense, causing more pain. · Can disrupt your work, hobbies, home life, and relationships with friends and family. Chronic pain is a very real condition. It is not just in your head. Treatment can help and usually includes several methods used together, such as medicines, physical therapy, exercise, and other treatments. Learning how to relax and changing negative thought patterns can also help you cope. Chronic pain is complex. Taking an active role in your treatment will help you better manage your pain. Tell your doctor if you have trouble dealing with your pain. You may have to try several things before you find what works best for you. Follow-up care is a key part of your treatment and safety.  Be sure to make and go to all appointments, and call your doctor if you are having problems. It's also a good idea to know your test results and keep a list of the medicines you take. How can you care for yourself at home? · Pace yourself. Break up large jobs into smaller tasks. Save harder tasks for days when you have less pain, or go back and forth between hard tasks and easier ones. Take rest breaks. · Relax, and reduce stress. Relaxation techniques such as deep breathing or meditation can help. · Keep moving. Gentle, daily exercise can help reduce pain over the long run. Try low- or no-impact exercises such as walking, swimming, and stationary biking. Do stretches to stay flexible. · Try heat, cold packs, and massage. · Get enough sleep. Chronic pain can make you tired and drain your energy. Talk with your doctor if you have trouble sleeping because of pain. · Think positive. Your thoughts can affect your pain level. Do things that you enjoy to distract yourself when you have pain instead of focusing on the pain. See a movie, read a book, listen to music, or spend time with a friend. · If you think you are depressed, talk to your doctor about treatment. · Keep a daily pain diary. Record how your moods, thoughts, sleep patterns, activities, and medicine affect your pain. You may find that your pain is worse during or after certain activities or when you are feeling a certain emotion. Having a record of your pain can help you and your doctor find the best ways to treat your pain. · Take pain medicines exactly as directed. ¨ If the doctor gave you a prescription medicine for pain, take it as prescribed. ¨ If you are not taking a prescription pain medicine, ask your doctor if you can take an over-the-counter medicine. Reducing constipation caused by pain medicine  · Include fruits, vegetables, beans, and whole grains in your diet each day. These foods are high in fiber.   · Drink plenty of fluids, enough so that your urine is light yellow or clear like water. If you have kidney, heart, or liver disease and have to limit fluids, talk with your doctor before you increase the amount of fluids you drink. · If your doctor recommends it, get more exercise. Walking is a good choice. Bit by bit, increase the amount you walk every day. Try for at least 30 minutes on most days of the week. · Schedule time each day for a bowel movement. A daily routine may help. Take your time and do not strain when having a bowel movement. When should you call for help? Call your doctor now or seek immediate medical care if:  ? · Your pain gets worse or is out of control. ? · You feel down or blue, or you do not enjoy things like you once did. You may be depressed, which is common in people with chronic pain. Depression can be treated. ? · You have vomiting or cramps for more than 2 hours. ? Watch closely for changes in your health, and be sure to contact your doctor if:  ? · You cannot sleep because of pain. ? · You are very worried or anxious about your pain. ? · You have trouble taking your pain medicine. ? · You have any concerns about your pain medicine. ? · You have trouble with bowel movements, such as:  ¨ No bowel movement in 3 days. ¨ Blood in the anal area, in your stool, or on the toilet paper. ¨ Diarrhea for more than 24 hours. Where can you learn more? Go to http://estrella-david.info/. Enter N004 in the search box to learn more about \"Chronic Pain: Care Instructions. \"  Current as of: October 14, 2016  Content Version: 11.4  © 3078-3921 [employer]. Care instructions adapted under license by [employer] (which disclaims liability or warranty for this information). If you have questions about a medical condition or this instruction, always ask your healthcare professional. [address] any warranty or liability for your use of this information.

## 2018-03-12 NOTE — ED NOTES
.Discharge instructions reviewed with patient and given to pt per MD Eric Ravi. Pt able to return/verbalize discharge instructions. Copy of discharge instructions given. RX given to pt. Pt condition stable, no further complaints. Pt out of ER, accompanied by self. Assisted to car by ED staff. Patient out of ED prior to obtaining discharge vitals. Belongings & discharge paperwork out of ED with patient. Ambulated to bathroom prior to discharge, steady gait.

## 2018-03-12 NOTE — ED PROVIDER NOTES
EMERGENCY DEPARTMENT HISTORY AND PHYSICAL EXAM    Date: 3/11/2018  Patient Name: Zenaida Self    History of Presenting Illness     Chief Complaint   Patient presents with    Vomiting     patient vomiting since this morning (approx 4 times), unable to hold fluids down       History Provided By: Patient    HPI: Zenaida Self is a 65 y.o. male, pmhx HTN, GERD, CAD, DM, diverticulosis, who presents ambulatory to the ED c/o burning, diffuse abdominal pain [id number] this morning. Pt reports associated nausea and vomiting. Pt also reports a follow-up tomorrow for an US for these intermittent episodes of abdominal burning, that onset after eating certain foods. Per chart review, pt was last seen by his PCP on 3/5, his PCP notes he will US to further evaluate and check the pt's amylase, CBC and liver enzymes. Pt has had an EGD performed in December 2017 by Dr. Max Ellison that was noted to be unremarkable. He denies taking any medications for relief of symptoms or any other relieving or exacerbating factors. Pt specifically denies any fever, congestion, cough, shortness of breath, chest pain, diarrhea, dysuria, or urinary frequency. PCP: Ky Santos MD   Cardiology: PMHx: Significant for HTN, GERD, CAD, Arrhythmia, DM, Arthritis, Kidney stone, diverticulosis  PSHx: Significant for Colonoscopy, Heart Cath, CABG, Pacemaker placement  Social Hx: -tobacco, -EtOH, -Illicit Drugs     There are no other complaints, changes, or physical findings at this time. Current Outpatient Prescriptions   Medication Sig Dispense Refill    ondansetron (ZOFRAN ODT) 4 mg disintegrating tablet Take 1 Tab by mouth every eight (8) hours as needed for Nausea. 10 Tab 0    pantoprazole (PROTONIX) 40 mg tablet Take 1 Tab by mouth daily for 20 days. 20 Tab 0    Blood-Glucose Meter (TRUE METRIX GLUCOSE METER) misc Use daily as needed for diabetes management.   Diagnosis E11.9 1 Each 0    glucose blood VI test strips (TRUE METRIX GLUCOSE TEST STRIP) strip Use 1 to 2 times daily as needed for Diabetes management. Diagnosis is 411.9. 50 Strip 5    glucose blood VI test strips (TRUETEST TEST STRIPS) strip by Does Not Apply route See Admin Instructions. 100 Strip prn    polyethylene glycol (MIRALAX) 17 gram/dose powder as needed.  ramipril (ALTACE) 10 mg capsule Take 10 mg by mouth daily.  UBIDECARENONE (CO Q-10 PO) Take  by mouth.  OTHER Nutratherm fat-furner stimulant      tadalafil (CIALIS) 20 mg tablet Take 20 mg by mouth as needed.  OTHER Take 7 Tabs by mouth daily. HEART HEALTH FROM [facility]      metoprolol succinate (TOPROL XL) 25 mg XL tablet Take 1 Tab by mouth daily. 30 Tab 11    aspirin 81 mg tablet Take 81 mg by mouth daily.          Past History     Past Medical History:  Past Medical History:   Diagnosis Date    Anxiety 8/5/2017    Arrhythmia     Arthritis     Atherosclerotic heart disease of native coronary artery without angina pectoris 8/5/2017    Bradycardia 8/5/2017    CAD (coronary artery disease)     Constipation, chronic 8/5/2017    Diabetes ([address]     diet controlled    Diabetes mellitus ([address] 8/5/2017    Diverticulosis 8/5/2017    ED (erectile dysfunction) 8/5/2017    GERD (gastroesophageal reflux disease)     Hypertension     Hypogonadism male 8/5/2017    Kidney stone     Neuropathy 8/5/2017    On statin therapy 8/5/2017    Right foot pain 8/5/2017       Past Surgical History:  Past Surgical History:   Procedure Laterality Date    CARDIAC SURG PROCEDURE UNLIST      cabg x4 vessels 1999    COLONOSCOPY N/A 6/21/2016    COLONOSCOPY performed by Meagan Newell MD at [address] [facility]; HI RISK IND  6/21/2016         HX GI      COLONOSCOPY    HX HEART CATHETERIZATION      HX OTHER SURGICAL      artificial testicle    HX PACEMAKER  10/6/15    [place] COLONOSCOPY FLX DX W/COLLJ SPEC WHEN PFRMD  4/8/2011         UPPER GI ENDOSCOPY,BIOPSY  12/14/2016            Family History:  Family History   Problem Relation Age of Onset    Heart Disease Mother     No Known Problems Father     Cancer Sister     Diabetes Brother     Anesth Problems Neg Hx        Social History:  Social History   Substance Use Topics    Smoking status: Never Smoker    Smokeless tobacco: Never Used    Alcohol use No       Allergies: Allergies   Allergen Reactions    Caffeine Unknown (comments)     Sweating AND WOOZY AND CAN'T SLEEP    Codeine Other (comments)     Lightheaded, WOOZY AND CAN'T SLEEP    Percocet [Oxycodone-Acetaminophen] Nausea and Vomiting         Review of Systems   Review of Systems   Constitutional: Negative for chills and fever. HENT: Negative. Eyes: Negative. Respiratory: Negative for cough, chest tightness and shortness of breath. Cardiovascular: Negative for chest pain and leg swelling. Gastrointestinal: Positive for abdominal pain, nausea and vomiting. Negative for diarrhea. Endocrine: Negative. Genitourinary: Negative for difficulty urinating and dysuria. Musculoskeletal: Negative for myalgias. Skin: Negative. Neurological: Negative. Psychiatric/Behavioral: Negative. All other systems reviewed and are negative. Physical Exam   Physical Exam   Constitutional: He is oriented to person, place, and time. He appears well-developed and well-nourished. No distress. HENT:   Head: Normocephalic and atraumatic. Nose: Nose normal.   Mouth/Throat: No oropharyngeal exudate. Eyes: Conjunctivae and EOM are normal. Pupils are equal, round, and reactive to light. Neck: Normal range of motion. Neck supple. No JVD present. Cardiovascular: Normal rate, regular rhythm, normal heart sounds and intact distal pulses. Exam reveals no friction rub. No murmur heard. Pulmonary/Chest: Effort normal and breath sounds normal. No stridor. No respiratory distress. He has no wheezes. He has no rales. Abdominal: Soft.  Bowel sounds are normal. He exhibits no distension. There is tenderness in the epigastric area. There is no rebound. Musculoskeletal: Normal range of motion. He exhibits no tenderness. Neurological: He is alert and oriented to person, place, and time. No cranial nerve deficit. Skin: Skin is warm and dry. No rash noted. He is not diaphoretic. Psychiatric: He has a normal mood and affect. His speech is normal and behavior is normal. Judgment and thought content normal. Cognition and memory are normal.   Nursing note and vitals reviewed.         Diagnostic Study Results     Labs -     Recent Results (from the past 12 hour(s))   CK W/ CKMB & INDEX    Collection Time: 03/11/18  5:17 PM   Result Value Ref Range    CK 76 39 - 308 U/L    CK - MB <1.0 <3.6 NG/ML    CK-MB Index Cannot be calculated 0 - 2.5     TROPONIN I    Collection Time: 03/11/18  5:17 PM   Result Value Ref Range    Troponin-I, Qt. <0.04 <0.05 ng/mL   URINALYSIS W/ REFLEX CULTURE    Collection Time: 03/11/18  7:19 PM   Result Value Ref Range    Color YELLOW/STRAW      Appearance CLEAR CLEAR      Specific gravity 1.025 1.003 - 1.030      pH (UA) 5.0 5.0 - 8.0      Protein TRACE (A) NEG mg/dL    Glucose NEGATIVE  NEG mg/dL    Ketone TRACE (A) NEG mg/dL    Bilirubin NEGATIVE  NEG      Blood NEGATIVE  NEG      Urobilinogen 0.2 0.2 - 1.0 EU/dL    Nitrites NEGATIVE  NEG      Leukocyte Esterase NEGATIVE  NEG      WBC 0-4 0 - 4 /hpf    RBC 0-5 0 - 5 /hpf    Epithelial cells FEW FEW /lpf    Bacteria NEGATIVE  NEG /hpf    UA:UC IF INDICATED CULTURE NOT INDICATED BY UA RESULT CNI      Hyaline cast 0-2 0 - 5 /lpf   CBC WITH AUTOMATED DIFF    Collection Time: 03/11/18  8:12 PM   Result Value Ref Range    WBC 10.2 4.1 - 11.1 K/uL    RBC 4.88 4.10 - 5.70 M/uL    HGB 15.7 12.1 - 17.0 g/dL    HCT 46.3 36.6 - 50.3 %    MCV 94.9 80.0 - 99.0 FL    MCH 32.2 26.0 - 34.0 PG    MCHC 33.9 30.0 - 36.5 g/dL    RDW 14.2 11.5 - 14.5 %    PLATELET 563 885 - 972 K/uL    MPV 9.8 8.9 - 12.9 FL    NRBC 0.0 0  WBC    ABSOLUTE NRBC 0.00 0.00 - 0.01 K/uL    NEUTROPHILS 90 (H) 32 - 75 %    LYMPHOCYTES 4 (L) 12 - 49 %    MONOCYTES 6 5 - 13 %    EOSINOPHILS 0 0 - 7 %    BASOPHILS 0 0 - 1 %    IMMATURE GRANULOCYTES 0 0.0 - 0.5 %    ABS. NEUTROPHILS 9.2 (H) 1.8 - 8.0 K/UL    ABS. LYMPHOCYTES 0.4 (L) 0.8 - 3.5 K/UL    ABS. MONOCYTES 0.6 0.0 - 1.0 K/UL    ABS. EOSINOPHILS 0.0 0.0 - 0.4 K/UL    ABS. BASOPHILS 0.0 0.0 - 0.1 K/UL    ABS. IMM. GRANS. 0.0 0.00 - 0.04 K/UL    DF SMEAR SCANNED      RBC COMMENTS NORMOCYTIC, NORMOCHROMIC     METABOLIC PANEL, COMPREHENSIVE    Collection Time: 03/11/18  8:12 PM   Result Value Ref Range    Sodium 139 136 - 145 mmol/L    Potassium 4.1 3.5 - 5.1 mmol/L    Chloride 105 97 - 108 mmol/L    CO2 26 21 - 32 mmol/L    Anion gap 8 5 - 15 mmol/L    Glucose 135 (H) 65 - 100 mg/dL    BUN 23 (H) 6 - 20 MG/DL    Creatinine 0.89 0.70 - 1.30 MG/DL    BUN/Creatinine ratio 26 (H) 12 - 20      GFR est AA >60 >60 ml/min/1.73m2    GFR est non-AA >60 >60 ml/min/1.73m2    Calcium 9.5 8.5 - 10.1 MG/DL    Bilirubin, total 0.8 0.2 - 1.0 MG/DL    ALT (SGPT) 31 12 - 78 U/L    AST (SGOT) 25 15 - 37 U/L    Alk.  phosphatase 77 45 - 117 U/L    Protein, total 7.3 6.4 - 8.2 g/dL    Albumin 3.5 3.5 - 5.0 g/dL    Globulin 3.8 2.0 - 4.0 g/dL    A-G Ratio 0.9 (L) 1.1 - 2.2     LIPASE    Collection Time: 03/11/18  8:12 PM   Result Value Ref Range    Lipase 58 (L) 73 - 393 U/L   EKG, 12 LEAD, INITIAL    Collection Time: 03/11/18  9:31 PM   Result Value Ref Range    Ventricular Rate 79 BPM    Atrial Rate 79 BPM    P-R Interval 180 ms    QRS Duration 146 ms    Q-T Interval 412 ms    QTC Calculation (Bezet) 472 ms    Calculated P Axis 48 degrees    Calculated R Axis -106 degrees    Calculated T Axis 74 degrees    Diagnosis       Atrial-sensed ventricular-paced rhythm  Biventricular pacemaker detected  Abnormal ECG  When compared with ECG of 10-MAR-2016 17:46,  Electronic ventricular pacemaker has replaced Sinus rhythm Radiologic Studies -   CT ABD PELV W CONT   Final Result        CT Results  (Last 48 hours)               03/11/18 2319  CT ABD PELV W CONT Final result    Impression:  IMPRESSION:       1. Gastric distention and evidence of gastroesophageal reflux. Consider chronic   gastroparesis. No bowel obstruction. 2. Nonobstructing left nephrolithiasis. 3. Questionable double duct sign and soft tissue prominence of the ampulla. Given the normal serum bilirubin, axillary mass is less likely. Narrative:  EXAM:  CT ABD PELV W CONT       INDICATION: Multiple episodes of emesis today. Normal serum bilirubin. COMPARISON: CT abdomen/pelvis without contrast on 9/21/2015. CONTRAST:  100 mL of Isovue-370. TECHNIQUE:    Following the uneventful intravenous administration of contrast, thin axial   images were obtained through the abdomen and pelvis. Coronal and sagittal   reconstructions were generated. Oral contrast was not administered. CT dose   reduction was achieved through use of a standardized protocol tailored for this   examination and automatic exposure control for dose modulation. FINDINGS:    LUNG BASES: Clear. INCIDENTALLY IMAGED HEART AND MEDIASTINUM: Left ventricular wall hypertrophy. No   effusion. Fluid in the esophagus may represent gastroesophageal reflux. LIVER: Tiny cysts. No solid mass. GALLBLADDER: Incomplete distention. CBD measures 7 mm in diameter. SPLEEN: Normal size. No mass or infarct. PANCREAS: Borderline pancreatic ductal dilatation. No inflammation. No mass. Subtle soft tissue attenuation at the ampulla of Vater. ADRENALS: Unremarkable. KIDNEYS: Calculus in the lower pole of the left kidney measures 6 mm. No   hydronephrosis. No solid renal mass. STOMACH: Distention with food and fluid, similar to 2015. SMALL BOWEL: No dilatation or wall thickening. COLON: No dilatation or wall thickening. APPENDIX: Unremarkable.    PERITONEUM: No ascites or pneumoperitoneum. RETROPERITONEUM: Aorta is atherosclerotic without aneurysm. No mesenteric   vascular occlusion. No lymphadenopathy. REPRODUCTIVE ORGANS: Mild prostatomegaly contains calcifications. Right seminal   vesicle is larger than the left, unchanged. URINARY BLADDER: No mass or calculus. BONES: No destructive bone lesion. ADDITIONAL COMMENTS: N/A                 Medical Decision Making   I am the first provider for this patient. I reviewed the vital signs, available nursing notes, past medical history, past surgical history, family history and social history. Vital Signs-Reviewed the patient's vital signs. Patient Vitals for the past 12 hrs:   Temp Pulse Resp BP SpO2   03/11/18 1907 98.3 °F (36.8 °C) 79 16 (!) 113/92 98 %       Pulse Oximetry Analysis - 98% on RA    Cardiac Monitor:   Rate: 79 bpm  Rhythm: Normal Sinus Rhythm      Records Reviewed: Nursing Notes, Old Medical Records, Previous electrocardiograms, Previous Radiology Studies and Previous Laboratory Studies    Provider Notes (Medical Decision Making):     DDX:  Gastritis, colitis, sbo, uti, dehydration, gastroenteritis    Plan:  Labs, ua, ivf, antiemetic, ct scan    Impression:  N/v/chronic abd pain    ED Course:   Initial assessment performed. The patients presenting problems have been discussed, and they are in agreement with the care plan formulated and outlined with them. I have encouraged them to ask questions as they arise throughout their visit. I reviewed our electronic medical record system for any past medical records that were available that may contribute to the patients current condition, the nursing notes and and vital signs from today's visit    Nursing notes will be reviewed as they become available in realtime while the pt has been in the ED. Gayle Nguyễn MD    EKG interpretation 6610: atrial sensed, L Axis, rate 79; , , QTc 472; no acute ischemia;  Gayle Nguyễn MD      I personally reviewed pt's imaging. Official read by radiology listed below. Gilberto Waller MD    PROGRESS NOTE:  10:31 PM  Pt reevaluated. Pt reports to be feeling better. Awaiting CT results, Will continue to monitor and re-eval when imaging results back. Written by Shavonne Robb ED Scribe, as dictated by Gilberto Waller MD      11:54 PM  Progress note:  Pt noted to be feeling better, ready for discharge. Discussed lab and imaging findings with pt and/or family, specifically noting gastritis. Pt will follow up as instructed. All questions have been answered, pt voiced understanding and agreement with plan. If narcotics were prescribed, pt was advised not to drive or operate heavy machinery. If abx were prescribed, pt advised that diarrhea and rash are possible side effects of the medications. Specific return precautions provided in addition to instructions for pt to return to the ED immediately should sx worsen at any time. Gilberto Waller MD      Critical Care Time:     none    PLAN:  1. Current Discharge Medication List      START taking these medications    Details   ondansetron (ZOFRAN ODT) 4 mg disintegrating tablet Take 1 Tab by mouth every eight (8) hours as needed for Nausea. Qty: 10 Tab, Refills: 0      pantoprazole (PROTONIX) 40 mg tablet Take 1 Tab by mouth daily for 20 days. Qty: 20 Tab, Refills: 0           2. Follow-up Information     Follow up With Details Comments Contact Info    Mary Ann Alex MD Schedule an appointment as soon as possible for a visit in 2 days  [address]      Marilynne Ganser., MD Schedule an appointment as soon as possible for a visit As needed [address]  [address] [facility]  [phone]          Return to ED if worse     Disposition:    11:54 PM   The patient's results have been reviewed with family and/or caregiver.  They verbally convey their understanding and agreement of the patient's signs, symptoms, diagnosis, treatment and prognosis and additionally agree to follow up as recommended in the discharge instructions or to return to the Emergency Room should the patient's condition change prior to their follow-up appointment. The family and/or caregiver verbally agrees with the care-plan and all of their questions have been answered. The discharge instructions have also been provided to the them with educational information regarding the patient's diagnosis as well a list of reasons why the patient would want to return to the ER prior to their follow-up appointment should their condition change. Stacey Huggins MD      Diagnosis     Clinical Impression:   1. Non-intractable vomiting with nausea, unspecified vomiting type    2. Chronic abdominal pain        Attestations: This note is prepared by Michael Jensen, acting as Scribe for MD Stacey Marte MD : The scribe's documentation has been prepared under my direction and personally reviewed by me in its entirety. I confirm that the note above accurately reflects all work, treatment, procedures, and medical decision making performed by me. This note will not be viewable in [address]

## 2018-03-13 ENCOUNTER — OFFICE VISIT (OUTPATIENT)
Dept: INTERNAL MEDICINE CLINIC | Age: 78
End: 2018-03-13

## 2018-03-13 VITALS
WEIGHT: 112.8 LBS | SYSTOLIC BLOOD PRESSURE: 144 MMHG | BODY MASS INDEX: 22.15 KG/M2 | TEMPERATURE: 97.5 F | HEIGHT: 60 IN | HEART RATE: 60 BPM | DIASTOLIC BLOOD PRESSURE: 78 MMHG | OXYGEN SATURATION: 98 %

## 2018-03-13 DIAGNOSIS — R19.7 DIARRHEA, UNSPECIFIED TYPE: Primary | ICD-10-CM

## 2018-03-13 RX ORDER — DIPHENOXYLATE HYDROCHLORIDE AND ATROPINE SULFATE 2.5; .025 MG/1; MG/1
TABLET ORAL
Qty: 20 TAB | Refills: 0 | Status: SHIPPED | OUTPATIENT
Start: 2018-03-13 | End: 2018-06-25 | Stop reason: ALTCHOICE

## 2018-03-13 NOTE — PROGRESS NOTES
Subjective:  Mr. Bisi Coates is a pleasant 65year old gentleman, who comes in today with his wife in attendance. He is a patient of Dr. Moo Sahu. All of his difficulties started on Sunday while at [employer]. He started getting nauseated and then started to vomit. Because of his increasing vomiting he was taken to the ER on Sunday. CT scan of the abdomen and pelvis failed to reveal any obstruction. There was a gastric distention and evidence of gastroesophageal reflux. He was therefore discharged home on Zofran and Protonix to be taken for the next 20 days. Yesterday morning he got up and developed watery diarrhea. He had several episodes of diarrhea yesterday. He has had no further nausea or vomiting. He denies presence of blood in his stools or melena. Denies any chills or fever. Because of the persistent diarrhea he contacted the office this morning to be seen for further evaluation. Since this morning he's only had three episodes of watery diarrhea. He denies any dizziness or headaches. No chills or fever. He denies any abdominal pain. He denies any urinary symptoms.       Past Medical History:   Diagnosis Date    Anxiety 8/5/2017    Arrhythmia     Arthritis     Atherosclerotic heart disease of native coronary artery without angina pectoris 8/5/2017    Bradycardia 8/5/2017    CAD (coronary artery disease)     Constipation, chronic 8/5/2017    Diabetes ([address]     diet controlled    Diabetes mellitus ([address] 8/5/2017    Diverticulosis 8/5/2017    ED (erectile dysfunction) 8/5/2017    GERD (gastroesophageal reflux disease)     Hypertension     Hypogonadism male 8/5/2017    Kidney stone     Neuropathy 8/5/2017    On statin therapy 8/5/2017    Right foot pain 8/5/2017     Past Surgical History:   Procedure Laterality Date    CARDIAC SURG PROCEDURE UNLIST      cabg x4 vessels 1999    COLONOSCOPY N/A 6/21/2016    [facility] at [facility] ENDOSCOPY    COLORECTAL SCRN; HI RISK IND  6/21/2016         HX GI      COLONOSCOPY    HX HEART CATHETERIZATION      HX OTHER SURGICAL      artificial testicle    HX PACEMAKER  10/6/15    [place] COLONOSCOPY FLX DX W/COLLJ SPEC WHEN PFRMD  4/8/2011         UPPER GI ENDOSCOPY,BIOPSY  12/14/2016            Current Outpatient Prescriptions on File Prior to Visit   Medication Sig Dispense Refill    ondansetron (ZOFRAN ODT) 4 mg disintegrating tablet Take 1 Tab by mouth every eight (8) hours as needed for Nausea. 10 Tab 0    pantoprazole (PROTONIX) 40 mg tablet Take 1 Tab by mouth daily for 20 days. 20 Tab 0    Blood-Glucose Meter (TRUE METRIX GLUCOSE METER) misc Use daily as needed for diabetes management. Diagnosis E11.9 1 Each 0    glucose blood VI test strips (TRUE METRIX GLUCOSE TEST STRIP) strip Use 1 to 2 times daily as needed for Diabetes management. Diagnosis is 411.9. 50 Strip 5    glucose blood VI test strips (TRUETEST TEST STRIPS) strip by Does Not Apply route See Admin Instructions. 100 Strip prn    ramipril (ALTACE) 10 mg capsule Take 10 mg by mouth daily.  UBIDECARENONE (CO Q-10 PO) Take  by mouth.  OTHER Nutratherm fat-furner stimulant      tadalafil (CIALIS) 20 mg tablet Take 20 mg by mouth as needed.  OTHER Take 7 Tabs by mouth daily. HEART HEALTH FROM [facility]      metoprolol succinate (TOPROL XL) 25 mg XL tablet Take 1 Tab by mouth daily. 30 Tab 11    aspirin 81 mg tablet Take 81 mg by mouth daily.  polyethylene glycol (MIRALAX) 17 gram/dose powder as needed. No current facility-administered medications on file prior to visit. Allergies   Allergen Reactions    Caffeine Unknown (comments)     Sweating AND WOOZY AND CAN'T SLEEP    Codeine Other (comments)     Lightheaded, WOOZY AND CAN'T SLEEP    Percocet [Oxycodone-Acetaminophen] Nausea and Vomiting       Physical Examination:  GENERAL:  Pleasant, thin gentleman in no acute distress. He is alert and oriented.   VITALS: BP: 144/78, non orthostatic. P: 60 and regular. T: 97.5. O2 sat: 98%. WT: 112 lbs. NECK:  Supple without adenopathy. CHEST:  Lungs clear to auscultation, no rales or wheezes. CV:  Heart regular rhythm without murmur. ABDOMEN:  Bowel sounds present in four quadrants, soft, non tender, no palpable organomegaly or masses. No CVA tenderness. EXTREMITIES:  No edema or calf tenderness. Distal pulses were present. Impression:  1. Diarrhea. I really strongly suspect he must have had some type of GI virus. Plan:  1. Since he's improving today and tolerating fluid well, it was opted to start Lomotil, to take 1-2 tablets 4x a day as needed for diarrhea. 2. I have instructed both he and his wife to keep him on a bland diet that includes rice, bananas, toast and baked chicken. 3. I did ask him to drink at least 8 oz of water on the hour. 4. He certainly will call us should he fail to improve or if his condition worsens.

## 2018-03-13 NOTE — MR AVS SNAPSHOT
[address] [facility] [address] [address] [phone] Patient: Ezra Samano MRN: [id number] E:[birth year] Visit Information Date & Time Provider Department Dept. Phone Encounter #  
 3/13/2018 10:45 AM Rich Moreno NP [facility] [phone] [id number] Follow-up Instructions Return if symptoms worsen or fail to improve. Your Appointments 6/25/2018  8:30 AM  
FOLLOW UP 10 with MD LEANN [facility] Appt Note: [address] [address] . [facility] [address] Upcoming Health Maintenance Date Due  
 EYE EXAM RETINAL OR DILATED Q1 7/1/1949 DTaP/Tdap/Td series (1 - Tdap) 7/1/1960 ZOSTER VACCINE AGE 60> 5/1/1999 GLAUCOMA SCREENING Q2Y 7/1/2004 MEDICARE YEARLY EXAM 8/8/2018 HEMOGLOBIN A1C Q6M 9/5/2018 MICROALBUMIN Q1 11/20/2018 FOOT EXAM Q1 3/5/2019 LIPID PANEL Q1 3/5/2019 COLONOSCOPY 6/21/2021 Allergies as of 3/13/2018  Review Complete On: 3/13/2018 By: Rich Moreno NP Severity Noted Reaction Type Reactions Caffeine  04/08/2011    Unknown (comments) Sweating AND WOOZY AND CAN'T SLEEP Codeine  04/07/2011    Other (comments) Lightheaded, WOOZY AND CAN'T SLEEP Percocet [Oxycodone-acetaminophen]  10/28/2015    Nausea and Vomiting Current Immunizations  Reviewed on 4/6/2016 Name Date Influenza High Dose Vaccine PF 10/24/2017 Influenza Vaccine 10/1/2016, 12/16/2015, 11/10/2014 Influenza Vaccine (Quad) PF 10/7/2015  8:47 AM  
 Pneumococcal Conjugate (PCV-13) 9/14/2015 Pneumococcal Vaccine (Unspecified Type) 10/17/2011 Not reviewed this visit You Were Diagnosed With   
  
 Codes Comments Diarrhea, unspecified type    -  Primary ICD-10-CM: R19.7 ICD-9-CM: 787.91 Vitals BP Pulse Temp Height(growth percentile) Weight(growth percentile) SpO2  
 144/78 (BP 1 Location: Left arm, BP Patient Position: Sitting) 60 97.5 °F (36.4 °C) (Oral) 4' 11\" (1.499 m) 112 lb 12.8 oz (51.2 kg) 98% BMI Smoking Status 22.78 kg/m2 Never Smoker BMI and BSA Data Body Mass Index Body Surface Area 22.78 kg/m 2 1.46 m 2 Preferred Pharmacy Pharmacy Name Phone [facility] [address], [address] [phone] Your Updated Medication List  
  
   
This list is accurate as of 3/13/18 12:53 PM.  Always use your most recent med list.  
  
  
  
  
 aspirin 81 mg tablet Take 81 mg by mouth daily. Blood-Glucose Meter Misc Commonly known as:  TRUE METRIX GLUCOSE METER Use daily as needed for diabetes management. Diagnosis E11.9 CIALIS 20 mg tablet Generic drug:  tadalafil Take 20 mg by mouth as needed. CO Q-10 PO Take  by mouth. * diphenoxylate-atropine 2.5-0.025 mg per tablet Commonly known as:  LOMOTIL Take one or two tablets 4 times a day as needed for diarrhea not to exceed more then 8 tablets daily * diphenoxylate-atropine 2.5-0.025 mg per tablet Commonly known as:  LOMOTIL Take one or two tablets 4 times a day as needed for diarrhea not to exceed more then 8 tablets daily * glucose blood VI test strips strip Commonly known as:  TRUE METRIX GLUCOSE TEST STRIP Use 1 to 2 times daily as needed for Diabetes management. Diagnosis is 411.9.  
  
 * glucose blood VI test strips strip Commonly known as:  TRUETEST TEST STRIPS  
by Does Not Apply route See Admin Instructions. metoprolol succinate 25 mg XL tablet Commonly known as:  TOPROL XL Take 1 Tab by mouth daily. ondansetron 4 mg disintegrating tablet Commonly known as:  ZOFRAN ODT  
 Take 1 Tab by mouth every eight (8) hours as needed for Nausea. * OTHER Take 7 Tabs by mouth daily. [address]  
  
 * OTHER Nutratherm fat-furner stimulant  
  
 pantoprazole 40 mg tablet Commonly known as:  PROTONIX Take 1 Tab by mouth daily for 20 days. polyethylene glycol 17 gram/dose powder Commonly known as:  Gillermina [place]  
as needed. ramipril 10 mg capsule Commonly known as:  ALTACE Take 10 mg by mouth daily. * Notice: This list has 6 medication(s) that are the same as other medications prescribed for you. Read the directions carefully, and ask your doctor or other care provider to review them with you. Prescriptions Printed Refills diphenoxylate-atropine (LOMOTIL) 2.5-0.025 mg per tablet 0 Sig: Take one or two tablets 4 times a day as needed for diarrhea not to exceed more then 8 tablets daily Class: Print  
 diphenoxylate-atropine (LOMOTIL) 2.5-0.025 mg per tablet 0 Sig: Take one or two tablets 4 times a day as needed for diarrhea not to exceed more then 8 tablets daily Class: Print Follow-up Instructions Return if symptoms worsen or fail to improve. Patient Instructions Diarrhea: Care Instructions Your Care Instructions Diarrhea is loose, watery stools (bowel movements). The exact cause is often hard to find. Sometimes diarrhea is your body's way of getting rid of what caused an upset stomach. Viruses, food poisoning, and many medicines can cause diarrhea. Some people get diarrhea in response to emotional stress, anxiety, or certain foods. Almost everyone has diarrhea now and then. It usually isn't serious, and your stools will return to normal soon. The important thing to do is replace the fluids you have lost, so you can prevent dehydration. The doctor has checked you carefully, but problems can develop later. If you notice any problems or new symptoms, get medical treatment right away. Follow-up care is a key part of your treatment and safety. Be sure to make and go to all appointments, and call your doctor if you are having problems. It's also a good idea to know your test results and keep a list of the medicines you take. How can you care for yourself at home? · Watch for signs of dehydration, which means your body has lost too much water. Dehydration is a serious condition and should be treated right away. Signs of dehydration are: 
¨ Increasing thirst and dry eyes and mouth. ¨ Feeling faint or lightheaded. ¨ Darker urine, and a smaller amount of urine than normal. 
· To prevent dehydration, drink plenty of fluids, enough so that your urine is light yellow or clear like water. Choose water and other caffeine-free clear liquids until you feel better. If you have kidney, heart, or liver disease and have to limit fluids, talk with your doctor before you increase the amount of fluids you drink. · Begin eating small amounts of mild foods the next day, if you feel like it. ¨ Try yogurt that has live cultures of Lactobacillus. (Check the label.) ¨ Avoid spicy foods, fruits, alcohol, and caffeine until 48 hours after all symptoms are gone. ¨ Avoid chewing gum that contains sorbitol. ¨ Avoid dairy products (except for yogurt with Lactobacillus) while you have diarrhea and for 3 days after symptoms are gone. · The doctor may recommend that you take over-the-counter medicine, such as loperamide (Imodium), if you still have diarrhea after 6 hours. Read and follow all instructions on the label. Do not use this medicine if you have bloody diarrhea, a high fever, or other signs of serious illness. Call your doctor if you think you are having a problem with your medicine. When should you call for help? Call 911 anytime you think you may need emergency care. For example, call if: 
? · You passed out (lost consciousness). ? · Your stools are maroon or very bloody. ?Call your doctor now or seek immediate medical care if: 
? · You are dizzy or lightheaded, or you feel like you may faint. ? · Your stools are black and look like tar, or they have streaks of blood. ? · You have new or worse belly pain. ? · You have symptoms of dehydration, such as: ¨ Dry eyes and a dry mouth. ¨ Passing only a little dark urine. ¨ Feeling thirstier than usual.  
? · You have a new or higher fever. ? Watch closely for changes in your health, and be sure to contact your doctor if: 
? · Your diarrhea is getting worse. ? · You see pus in the diarrhea. ? · You are not getting better after 2 days (48 hours). Where can you learn more? Go to http://estrella-david.info/. Enter R302 in the search box to learn more about \"Diarrhea: Care Instructions. \" Current as of: March 20, 2017 Content Version: 11.4 © 1250-4836 [employer]. Care instructions adapted under license by [employer] (which disclaims liability or warranty for this information). If you have questions about a medical condition or this instruction, always ask your healthcare professional. [address] any warranty or liability for your use of this information. Introducing hospitals & HEALTH SERVICES! Eri Negro introduces Singly patient portal. Now you can access parts of your medical record, email your doctor's office, and request medication refills online. 1. In your internet browser, go to [email or web address] 2. Click on the First Time User? Click Here link in the Sign In box. You will see the New Member Sign Up page. 3. Enter your Singly Access Code exactly as it appears below. You will not need to use this code after youve completed the sign-up process. If you do not sign up before the expiration date, you must request a new code. · Singly Access Code: [email or web address] Expires: 6/3/2018  9:33 AM 
 
 4. Enter the last four digits of your Social Security Number (xxxx) and Date of Birth (mm/dd/yyyy) as indicated and click Submit. You will be taken to the next sign-up page. 5. Create a TaposÃ©Â© ID. This will be your TaposÃ©Â© login ID and cannot be changed, so think of one that is secure and easy to remember. 6. Create a TaposÃ©Â© password. You can change your password at any time. 7. Enter your Password Reset Question and Answer. This can be used at a later time if you forget your password. 8. Enter your e-mail address. You will receive e-mail notification when new information is available in [address] 9. Click Sign Up. You can now view and download portions of your medical record. 10. Click the Download Summary menu link to download a portable copy of your medical information. If you have questions, please visit the Frequently Asked Questions section of the TaposÃ©Â© website. Remember, Tapos[employer]© is NOT to be used for urgent needs. For medical emergencies, dial 911. Now available from your iPhone and Android! Please provide this summary of care documentation to your next provider. Your primary care clinician is listed as Henry. If you have any questions after today's visit, please call [phone].

## 2018-03-13 NOTE — PATIENT INSTRUCTIONS
Diarrhea: Care Instructions  Your Care Instructions    Diarrhea is loose, watery stools (bowel movements). The exact cause is often hard to find. Sometimes diarrhea is your body's way of getting rid of what caused an upset stomach. Viruses, food poisoning, and many medicines can cause diarrhea. Some people get diarrhea in response to emotional stress, anxiety, or certain foods. Almost everyone has diarrhea now and then. It usually isn't serious, and your stools will return to normal soon. The important thing to do is replace the fluids you have lost, so you can prevent dehydration. The doctor has checked you carefully, but problems can develop later. If you notice any problems or new symptoms, get medical treatment right away. Follow-up care is a key part of your treatment and safety. Be sure to make and go to all appointments, and call your doctor if you are having problems. It's also a good idea to know your test results and keep a list of the medicines you take. How can you care for yourself at home? · Watch for signs of dehydration, which means your body has lost too much water. Dehydration is a serious condition and should be treated right away. Signs of dehydration are:  ¨ Increasing thirst and dry eyes and mouth. ¨ Feeling faint or lightheaded. ¨ Darker urine, and a smaller amount of urine than normal.  · To prevent dehydration, drink plenty of fluids, enough so that your urine is light yellow or clear like water. Choose water and other caffeine-free clear liquids until you feel better. If you have kidney, heart, or liver disease and have to limit fluids, talk with your doctor before you increase the amount of fluids you drink. · Begin eating small amounts of mild foods the next day, if you feel like it. ¨ Try yogurt that has live cultures of Lactobacillus. (Check the label.)  ¨ Avoid spicy foods, fruits, alcohol, and caffeine until 48 hours after all symptoms are gone.   ¨ Avoid chewing gum that contains sorbitol. ¨ Avoid dairy products (except for yogurt with Lactobacillus) while you have diarrhea and for 3 days after symptoms are gone. · The doctor may recommend that you take over-the-counter medicine, such as loperamide (Imodium), if you still have diarrhea after 6 hours. Read and follow all instructions on the label. Do not use this medicine if you have bloody diarrhea, a high fever, or other signs of serious illness. Call your doctor if you think you are having a problem with your medicine. When should you call for help? Call 911 anytime you think you may need emergency care. For example, call if:  ? · You passed out (lost consciousness). ? · Your stools are maroon or very bloody. ?Call your doctor now or seek immediate medical care if:  ? · You are dizzy or lightheaded, or you feel like you may faint. ? · Your stools are black and look like tar, or they have streaks of blood. ? · You have new or worse belly pain. ? · You have symptoms of dehydration, such as:  ¨ Dry eyes and a dry mouth. ¨ Passing only a little dark urine. ¨ Feeling thirstier than usual.   ? · You have a new or higher fever. ? Watch closely for changes in your health, and be sure to contact your doctor if:  ? · Your diarrhea is getting worse. ? · You see pus in the diarrhea. ? · You are not getting better after 2 days (48 hours). Where can you learn more? Go to http://estrella-david.info/. Enter X327 in the search box to learn more about \"Diarrhea: Care Instructions. \"  Current as of: March 20, 2017  Content Version: 11.4  © 2757-9537 [employer]. Care instructions adapted under license by [employer] (which disclaims liability or warranty for this information). If you have questions about a medical condition or this instruction, always ask your healthcare professional. [address] any warranty or liability for your use of this information.

## 2018-03-13 NOTE — PROGRESS NOTES
Mallory Mckeon presents with   Chief Complaint   Patient presents with    Diarrhea     uncontrollable   Patient of Dr Seleta Denver here for an ER room followup. Patient was seen at [facility] on 3/11/18 for nausea, vomiting & diarrhea. Prescribed Zofran & pantoprazole. CT was also performed. Patient states the nausea & vomiting got better but the diarrhea is worse. Describes it as \"uncontrollable\" yesterday. 1. Have you been to the ER, urgent care clinic since your last visit? Hospitalized since your last visit? Yes When: 03/11/18 Where: [facility] Reason for visit: Vomiting    2. Have you seen or consulted any other health care providers outside of the [address] [place] [address] since your last visit? Include any pap smears or colon screening.  No

## 2018-03-14 NOTE — PROGRESS NOTES
Ultrasound is normal.  There is a left renal stone which is nonobstructing and not a problem. Patient informed.

## 2018-04-10 DIAGNOSIS — I10 ESSENTIAL HYPERTENSION: Primary | ICD-10-CM

## 2018-04-10 RX ORDER — RAMIPRIL 10 MG/1
CAPSULE ORAL
Qty: 180 CAP | Refills: 3 | Status: SHIPPED | OUTPATIENT
Start: 2018-04-10 | End: 2019-01-28 | Stop reason: SDUPTHER

## 2018-04-10 RX ORDER — METOPROLOL SUCCINATE 25 MG/1
TABLET, EXTENDED RELEASE ORAL
Qty: 90 TAB | Refills: 3 | Status: SHIPPED | OUTPATIENT
Start: 2018-04-10 | End: 2019-04-22 | Stop reason: SDUPTHER

## 2018-05-01 ENCOUNTER — OFFICE VISIT (OUTPATIENT)
Dept: INTERNAL MEDICINE CLINIC | Age: 78
End: 2018-05-01

## 2018-05-01 VITALS
TEMPERATURE: 98.4 F | BODY MASS INDEX: 22.7 KG/M2 | DIASTOLIC BLOOD PRESSURE: 72 MMHG | SYSTOLIC BLOOD PRESSURE: 120 MMHG | OXYGEN SATURATION: 97 % | HEART RATE: 60 BPM | HEIGHT: 60 IN | RESPIRATION RATE: 17 BRPM | WEIGHT: 115.6 LBS

## 2018-05-01 DIAGNOSIS — M15.9 PRIMARY OSTEOARTHRITIS INVOLVING MULTIPLE JOINTS: ICD-10-CM

## 2018-05-01 DIAGNOSIS — M79.641 PAIN OF RIGHT HAND: Primary | ICD-10-CM

## 2018-05-01 RX ORDER — MELOXICAM 7.5 MG/1
7.5 TABLET ORAL DAILY
Qty: 30 TAB | Status: SHIPPED | OUTPATIENT
Start: 2018-05-01 | End: 2018-08-06 | Stop reason: ALTCHOICE

## 2018-05-01 NOTE — PROGRESS NOTES
Chief Complaint   Patient presents with    Wrist Pain     right wrist/hand/arm     1. Have you been to the ER, urgent care clinic since your last visit? Hospitalized since your last visit? No    2. Have you seen or consulted any other health care providers outside of the [facility] since your last visit? Include any pap smears or colon screening.  No

## 2018-05-01 NOTE — MR AVS SNAPSHOT
[address] [facility] [address] [id number] [phone] Patient: Wing Padilla MRN: [id number] MGE:[id number] Visit Information Date & Time Provider Department Dept. Phone Encounter #  
 5/1/2018  2:30 PM Francisco Ortiz, [facility] [phone] [id number] Follow-up Instructions Return for Head, prior scheduled appointment. Your Appointments 6/25/2018  8:30 AM  
FOLLOW UP 10 with MD LEANN Elkins [facility] ([address]) Appt Note: [address] [address] [address] [address] Upcoming Health Maintenance Date Due  
 EYE EXAM RETINAL OR DILATED Q1 7/1/1950 DTaP/Tdap/Td series (1 - Tdap) 7/1/1960 ZOSTER VACCINE AGE 60> 5/1/1999 GLAUCOMA SCREENING Q2Y 7/1/2004 Influenza Age 5 to Adult 8/1/2018 MEDICARE YEARLY EXAM 8/8/2018 HEMOGLOBIN A1C Q6M 9/5/2018 MICROALBUMIN Q1 11/20/2018 FOOT EXAM Q1 3/5/2019 LIPID PANEL Q1 3/5/2019 COLONOSCOPY 6/21/2021 Allergies as of 5/1/2018  Review Complete On: 5/1/2018 By: Francisco Ortiz MD  
  
 Severity Noted Reaction Type Reactions Caffeine  04/08/2011    Unknown (comments) Sweating AND WOOZY AND CAN'T SLEEP Codeine  04/07/2011    Other (comments) Lightheaded, WOOZY AND CAN'T SLEEP Percocet [Oxycodone-acetaminophen]  10/28/2015    Nausea and Vomiting Current Immunizations  Reviewed on 4/6/2016 Name Date Influenza High Dose Vaccine PF 10/24/2017 Influenza Vaccine 10/1/2016, 12/16/2015, 11/10/2014 Influenza Vaccine (Quad) PF 10/7/2015  8:47 AM  
 Pneumococcal Conjugate (PCV-13) 9/14/2015 Pneumococcal Vaccine (Unspecified Type) 10/17/2011 Not reviewed this visit You Were Diagnosed With   
  
 Codes Comments Pain of right hand    -  Primary ICD-10-CM: M79.641 ICD-9-CM: 729.5 Primary osteoarthritis involving multiple joints     ICD-10-CM: M15.0 ICD-9-CM: 715.09 Vitals BP Pulse Temp Resp Height(growth percentile) Weight(growth percentile) 120/72 (BP 1 Location: Left arm, BP Patient Position: Sitting) 60 98.4 °F (36.9 °C) (Oral) 17 4' 11\" (1.499 m) 115 lb 9.6 oz (52.4 kg) SpO2 BMI Smoking Status 97% 23.35 kg/m2 Never Smoker Vitals History BMI and BSA Data Body Mass Index Body Surface Area  
 23.35 kg/m 2 1.48 m 2 Preferred Pharmacy Pharmacy Name Phone [facility] [address] [phone] Your Updated Medication List  
  
   
This list is accurate as of 5/1/18  4:39 PM.  Always use your most recent med list.  
  
  
  
  
 aspirin 81 mg tablet Take 81 mg by mouth daily. Blood-Glucose Meter Misc Commonly known as:  TRUE METRIX GLUCOSE METER Use daily as needed for diabetes management. Diagnosis E11.9 CIALIS 20 mg tablet Generic drug:  tadalafil Take 20 mg by mouth as needed. CO Q-10 PO Take  by mouth. * diphenoxylate-atropine 2.5-0.025 mg per tablet Commonly known as:  LOMOTIL Take one or two tablets 4 times a day as needed for diarrhea not to exceed more then 8 tablets daily * diphenoxylate-atropine 2.5-0.025 mg per tablet Commonly known as:  LOMOTIL Take one or two tablets 4 times a day as needed for diarrhea not to exceed more then 8 tablets daily * glucose blood VI test strips strip Commonly known as:  TRUE METRIX GLUCOSE TEST STRIP Use 1 to 2 times daily as needed for Diabetes management. Diagnosis is 411.9.  
  
 * glucose blood VI test strips strip Commonly known as:  TRUETEST TEST STRIPS  
by Does Not Apply route See Admin Instructions. meloxicam 7.5 mg tablet Commonly known as:  MOBIC Take 1 Tab by mouth daily. metoprolol succinate 25 mg XL tablet Commonly known as:  TOPROL-XL  
TAKE ONE TABLET BY MOUTH ONCE DAILY  
  
 ondansetron 4 mg disintegrating tablet Commonly known as:  ZOFRAN ODT Take 1 Tab by mouth every eight (8) hours as needed for Nausea. * OTHER Take 7 Tabs by mouth daily. [address]  
  
 * OTHER Nutratherm fat-furner stimulant  
  
 polyethylene glycol 17 gram/dose powder Commonly known as:  Emmer Oxford  
as needed. ramipril 10 mg capsule Commonly known as:  ALTACE  
TAKE TWO CAPSULES BY MOUTH ONCE DAILY * Notice: This list has 6 medication(s) that are the same as other medications prescribed for you. Read the directions carefully, and ask your doctor or other care provider to review them with you. Prescriptions Sent to Pharmacy Refills  
 meloxicam (MOBIC) 7.5 mg tablet prn Sig: Take 1 Tab by mouth daily. Class: Normal  
 Pharmacy: [facility] DR MALVIN NUNEZ [address] [place] [address], [address] [place] [address] #: [phone] Route: Oral  
  
Follow-up Instructions Return for Head, prior scheduled appointment. To-Do List   
 05/01/2018 Imaging:  XR HAND RT MIN 3 V Patient Instructions Hand Pain: Care Instructions Your Care Instructions Common causes of hand pain are overuse and injuries, such as might happen during sports or home repair projects. Everyday wear and tear, especially as you get older, also can cause hand pain. Most minor hand injuries will heal on their own, and home treatment is usually all you need to do. If you have sudden and severe pain, you may need tests and treatment. Follow-up care is a key part of your treatment and safety. Be sure to make and go to all appointments, and call your doctor if you are having problems. It's also a good idea to know your test results and keep a list of the medicines you take. How can you care for yourself at home? · Take pain medicines exactly as directed. ¨ If the doctor gave you a prescription medicine for pain, take it as prescribed. ¨ If you are not taking a prescription pain medicine, ask your doctor if you can take an over-the-counter medicine. · Rest and protect your hand. Take a break from any activity that may cause pain. · Put ice or a cold pack on your hand for 10 to 20 minutes at a time. Put a thin cloth between the ice and your skin. · Prop up the sore hand on a pillow when you ice it or anytime you sit or lie down during the next 3 days. Try to keep it above the level of your heart. This will help reduce swelling. · If your doctor recommends a sling, splint, or elastic bandage to support your hand, wear it as directed. When should you call for help? Call 911 anytime you think you may need emergency care. For example, call if: 
? · Your hand turns cool or pale or changes color. ?Call your doctor now or seek immediate medical care if: 
? · You cannot move your hand. ? · Your hand pops, moves out of its normal position, and then returns to its normal position. ? · You have signs of infection, such as: 
¨ Increased pain, swelling, warmth, or redness. ¨ Red streaks leading from the sore area. ¨ Pus draining from a place on your hand. ¨ A fever. ? · Your hand feels numb or tingly. ? Watch closely for changes in your health, and be sure to contact your doctor if: 
? · Your hand feels unstable when you try to use it. ? · You do not get better as expected. ? · You have any new symptoms, such as swelling. ? · Bruises from an injury to your hand last longer than 2 weeks. Where can you learn more? Go to http://estrella-david.info/. Enter R273 in the search box to learn more about \"Hand Pain: Care Instructions. \" Current as of: March 20, 2017 Content Version: 11.4 © 3221-3054 [employer].  Care instructions adapted under license by [employer] (which disclaims liability or warranty for this information). If you have questions about a medical condition or this instruction, always ask your healthcare professional. [address] any warranty or liability for your use of this information. Introducing [facility]! [facility] introduces [employer] patient portal. Now you can access parts of your medical record, email your doctor's office, and request medication refills online. 1. In your internet browser, go to [email or web address] 2. Click on the First Time User? Click Here link in the Sign In box. You will see the New Member Sign Up page. 3. Enter your [employer] Access Code exactly as it appears below. You will not need to use this code after youve completed the sign-up process. If you do not sign up before the expiration date, you must request a new code. · [employer] Access Code: [email or web address] Expires: 6/3/2018  9:33 AM 
 
4. Enter the last four digits of your Social Security Number (xxxx) and Date of Birth (mm/dd/yyyy) as indicated and click Submit. You will be taken to the next sign-up page. 5. Create a [employer] ID. This will be your [employer] login ID and cannot be changed, so think of one that is secure and easy to remember. 6. Create a [employer] password. You can change your password at any time. 7. Enter your Password Reset Question and Answer. This can be used at a later time if you forget your password. 8. Enter your e-mail address. You will receive e-mail notification when new information is available in [address] 9. Click Sign Up. You can now view and download portions of your medical record. 10. Click the Download Summary menu link to download a portable copy of your medical information. If you have questions, please visit the Frequently Asked Questions section of the [employer] website. Remember, [employer] is NOT to be used for urgent needs. For medical emergencies, dial 911. Now available from your iPhone and Android! Please provide this summary of care documentation to your next provider. Your primary care clinician is listed as Henry. If you have any questions after today's visit, please call [phone].

## 2018-05-01 NOTE — PROGRESS NOTES
Subjective:   Oliverio Aquino is a 65 y.o. male      Chief Complaint   Patient presents with    Wrist Pain     right wrist/hand/arm        History of present illness: He presents complaints of pain in the right hand when he feels like he cannot  is strong as he used to. He does notice sometimes the pain seems to shoot up to the right shoulder like it did the day but mostly is in the right hand. He is right-handed. He has no history of trauma. He came related to being the ulnar versus median nerve distribution it seems to be the whole hand is affected. There are no other specific complaints.     Patient Active Problem List   Diagnosis Code    Cardiomyopathy ([facility] [address] I42.9    ASCVD (arteriosclerotic cardiovascular disease) I25.10    Essential hypertension I10    Mixed hyperlipidemia E78.2    Primary osteoarthritis involving multiple joints M15.0    Diverticulosis K57.90    On statin therapy Z79.899    Neuropathy G62.9    Hypogonadism male E29.1    ED (erectile dysfunction) N52.9    Controlled type 2 diabetes mellitus with diabetic polyneuropathy, without long-term current use of insulin ([facility]) E11.42    Constipation, chronic K59.09    Bradycardia R00.1    Anxiety F41.9    Medicare annual wellness visit, initial Z00.00    Colon cancer screening Z12.11    Epigastric pain R10.13    Pain of right hand M79.641      Past Medical History:   Diagnosis Date    Anxiety 8/5/2017    Arrhythmia     Arthritis     Atherosclerotic heart disease of native coronary artery without angina pectoris 8/5/2017    Bradycardia 8/5/2017    CAD (coronary artery disease)     Constipation, chronic 8/5/2017    Diabetes ([address]     diet controlled    Diabetes mellitus ([address] 8/5/2017    Diverticulosis 8/5/2017    ED (erectile dysfunction) 8/5/2017    GERD (gastroesophageal reflux disease)     Hypertension     Hypogonadism male 8/5/2017    Kidney stone     Neuropathy 8/5/2017    On statin therapy 8/5/2017    Right foot pain 8/5/2017      Allergies   Allergen Reactions    Caffeine Unknown (comments)     Sweating AND WOOZY AND CAN'T SLEEP    Codeine Other (comments)     Lightheaded, WOOZY AND CAN'T SLEEP    Percocet [Oxycodone-Acetaminophen] Nausea and Vomiting      Family History   Problem Relation Age of Onset    Heart Disease Mother     No Known Problems Father     Cancer Sister     Diabetes Brother     Anesth Problems Neg Hx       Social History     Social History    Marital status:      Spouse name: N/A    Number of children: N/A    Years of education: N/A     Occupational History    Not on file. Social History Main Topics    Smoking status: Never Smoker    Smokeless tobacco: Never Used    Alcohol use No    Drug use: No    Sexual activity: Not on file     Other Topics Concern    Not on file     Social History Narrative     Prior to Admission medications    Medication Sig Start Date End Date Taking? Authorizing Provider   meloxicam (MOBIC) 7.5 mg tablet Take 1 Tab by mouth daily. 5/1/18  Yes Kojo Bro MD   metoprolol succinate (TOPROL-XL) 25 mg XL tablet TAKE ONE TABLET BY MOUTH ONCE DAILY 4/10/18  Yes Kojo Bro MD   ramipril (ALTACE) 10 mg capsule TAKE TWO CAPSULES BY MOUTH ONCE DAILY 4/10/18  Yes Kojo Bro MD   ondansetron (ZOFRAN ODT) 4 mg disintegrating tablet Take 1 Tab by mouth every eight (8) hours as needed for Nausea. 3/11/18  Yes Janella Severance, MD   Blood-Glucose Meter (TRUE METRIX GLUCOSE METER) misc Use daily as needed for diabetes management. Diagnosis E11.9 11/20/17  Yes Kojo Bro MD   glucose blood VI test strips (TRUE METRIX GLUCOSE TEST STRIP) strip Use 1 to 2 times daily as needed for Diabetes management. Diagnosis is 411.9. 11/20/17  Yes Kojo Bro MD   glucose blood VI test strips (TRUETEST TEST STRIPS) strip by Does Not Apply route See Admin Instructions.  11/20/17  Yes Kojo Bro MD   polyethylene glycol [facility] 17 gram/dose powder as needed. 5/30/17  Yes Historical Provider   UBIDECARENONE (CO Q-10 PO) Take  by mouth. Yes Historical Provider   OTHER Nutratherm fat-furner stimulant   Yes Historical Provider   tadalafil (CIALIS) 20 mg tablet Take 20 mg by mouth as needed. Yes Historical Provider   OTHER Take 7 Tabs by mouth daily. [address]   Yes Historical Provider   aspirin 81 mg tablet Take 81 mg by mouth daily. 4/7/11  Yes Historical Provider   diphenoxylate-atropine (LOMOTIL) 2.5-0.025 mg per tablet Take one or two tablets 4 times a day as needed for diarrhea not to exceed more then 8 tablets daily 3/13/18   Shaun Felton NP   diphenoxylate-atropine (LOMOTIL) 2.5-0.025 mg per tablet Take one or two tablets 4 times a day as needed for diarrhea not to exceed more then 8 tablets daily 3/13/18   Obey Parson NP        Review of Systems              Constitutional:  He denies fever, weight loss, sweats or fatigue. EYES: No blurred or double vision,               ENT: no nasal congestion, no headache or dizziness. No difficulty with               swallowing, taste, speech or smell. Respiratory:  No cough, wheezing or shortness of breath. No sputum production. Cardiac:  Denies chest pain, palpitations, unexplained indigestion, syncope, edema, PND or orthopnea. GI:  No changes in bowel movements, no abdominal pain, no bloating, anorexia, nausea, vomiting or heartburn. :  No frequency or dysuria. Denies incontinence or sexual dysfunction. Extremities:  No joint pain, stiffness or swelling. Generalized right hand pain and weakness  Back:.no pain or soreness  Skin:  No recent rashes or mole changes. Neurological:  No numbness, tingling, burning paresthesias or loss of motor strength. No syncope, dizziness, frequent headaches or memory loss.   Hematologic:  No easy bruising  Lymphatic: No lymph node enlargement    Objective:     Vitals:    05/01/18 1457   BP: 120/72   Pulse: 60   Resp: 17   Temp: 98.4 °F (36.9 °C)   TempSrc: Oral   SpO2: 97%   Weight: 115 lb 9.6 oz (52.4 kg)   Height: 4' 11\" (1.499 m)   PainSc:   4   PainLoc: Wrist       Body mass index is 23.35 kg/(m^2). Physical Examination:              General Appearance:  Well-developed, well-nourished, no acute distress. HEENT:      Ears:  The TMs and ear canals were clear. Eyes:  The pupillary responses were normal.  Extraocular muscle function intact. Lids and conjunctiva not injected. Funduscopic exam revealed sharp disc margins. Nares: Clear w/o edema or erythema  Pharynx:  Clear with teeth in good repair. No masses were noted. Neck:  Supple without thyromegaly or adenopathy. No JVD noted. No carotid                bruits. Lungs:  Clear to auscultation and percussion. Cardiac:  Regular rate and rhythm without murmur. PMI not displaced. No gallop, rub or click. Abdominal: Soft, non-tender, no hepata-spleenomegally or masses  Extremities:  No clubbing, cyanosis or edema. Right hand without active synovitis. Tinel sign is negative.  does seem to be slightly decreased on the right compared to the left but not markedly so. Skin:  No rash or unusual mole changes noted. Lymph Nodes:  None felt in the cervical, supraclavicular, axillary or inguinal region. Neurological: . DTRs 2+ and symmetric. Station and gait normal.   Hematologic:   No purpura or petechiae        Assessment/Plan:         1. Pain of right hand    2. Primary osteoarthritis involving multiple joints        Impressions/Plan:  Impression  1. Right hand pain I think this is probably arthritis related x-ray obtained today  2. JOSUED will put him on Mobic 7.5 daily  Advance care planning discussed with him he does have medical directives at home and will get us a copy. Recheck at previously scheduled appointment or sooner should they be a problem.     Orders Placed This Encounter    XR HAND RT MIN 3 V    meloxicam (MOBIC) 7.5 mg tablet       Follow-up Disposition:  Return for Head, prior scheduled appointment. Results for orders placed or performed in visit on 05/01/18   XR HAND RT MIN 3 V    Narrative    INDICATION: pain    EXAM: 3 VIEW HAND RADIOGRAPH. FINDINGS: A three-view examination of the right  hand reveals no fracture,  dislocation or other acute bony abnormality. No soft tissue swelling focally. Degenerative change is noted. Impression    IMPRESSION: No acute bony abnormality of the right hand. Keenan Long MD    The patient was given after the visit summary the patient verbalized an understanding of the plans and problems as explained.

## 2018-05-01 NOTE — PATIENT INSTRUCTIONS
Hand Pain: Care Instructions  Your Care Instructions    Common causes of hand pain are overuse and injuries, such as might happen during sports or home repair projects. Everyday wear and tear, especially as you get older, also can cause hand pain. Most minor hand injuries will heal on their own, and home treatment is usually all you need to do. If you have sudden and severe pain, you may need tests and treatment. Follow-up care is a key part of your treatment and safety. Be sure to make and go to all appointments, and call your doctor if you are having problems. It's also a good idea to know your test results and keep a list of the medicines you take. How can you care for yourself at home? · Take pain medicines exactly as directed. ¨ If the doctor gave you a prescription medicine for pain, take it as prescribed. ¨ If you are not taking a prescription pain medicine, ask your doctor if you can take an over-the-counter medicine. · Rest and protect your hand. Take a break from any activity that may cause pain. · Put ice or a cold pack on your hand for 10 to 20 minutes at a time. Put a thin cloth between the ice and your skin. · Prop up the sore hand on a pillow when you ice it or anytime you sit or lie down during the next 3 days. Try to keep it above the level of your heart. This will help reduce swelling. · If your doctor recommends a sling, splint, or elastic bandage to support your hand, wear it as directed. When should you call for help? Call 911 anytime you think you may need emergency care. For example, call if:  ? · Your hand turns cool or pale or changes color. ?Call your doctor now or seek immediate medical care if:  ? · You cannot move your hand. ? · Your hand pops, moves out of its normal position, and then returns to its normal position. ? · You have signs of infection, such as:  ¨ Increased pain, swelling, warmth, or redness. ¨ Red streaks leading from the sore area.   ¨ Pus draining from a place on your hand. ¨ A fever. ? · Your hand feels numb or tingly. ? Watch closely for changes in your health, and be sure to contact your doctor if:  ? · Your hand feels unstable when you try to use it. ? · You do not get better as expected. ? · You have any new symptoms, such as swelling. ? · Bruises from an injury to your hand last longer than 2 weeks. Where can you learn more? Go to http://estrella-david.info/. Enter R273 in the search box to learn more about \"Hand Pain: Care Instructions. \"  Current as of: March 20, 2017  Content Version: 11.4  © 3024-8029 [employer]. Care instructions adapted under license by [employer] (which disclaims liability or warranty for this information). If you have questions about a medical condition or this instruction, always ask your healthcare professional. [address] any warranty or liability for your use of this information.

## 2018-06-25 ENCOUNTER — OFFICE VISIT (OUTPATIENT)
Dept: INTERNAL MEDICINE CLINIC | Age: 78
End: 2018-06-25

## 2018-06-25 VITALS
WEIGHT: 119 LBS | TEMPERATURE: 97.3 F | RESPIRATION RATE: 17 BRPM | OXYGEN SATURATION: 97 % | HEART RATE: 60 BPM | DIASTOLIC BLOOD PRESSURE: 80 MMHG | SYSTOLIC BLOOD PRESSURE: 112 MMHG | BODY MASS INDEX: 23.36 KG/M2 | HEIGHT: 60 IN

## 2018-06-25 DIAGNOSIS — E11.42 CONTROLLED TYPE 2 DIABETES MELLITUS WITH DIABETIC POLYNEUROPATHY, WITHOUT LONG-TERM CURRENT USE OF INSULIN (HCC): ICD-10-CM

## 2018-06-25 DIAGNOSIS — I25.10 ASCVD (ARTERIOSCLEROTIC CARDIOVASCULAR DISEASE): ICD-10-CM

## 2018-06-25 DIAGNOSIS — M15.9 PRIMARY OSTEOARTHRITIS INVOLVING MULTIPLE JOINTS: ICD-10-CM

## 2018-06-25 DIAGNOSIS — E78.2 MIXED HYPERLIPIDEMIA: ICD-10-CM

## 2018-06-25 DIAGNOSIS — I42.9 CARDIOMYOPATHY, UNSPECIFIED TYPE (HCC): ICD-10-CM

## 2018-06-25 DIAGNOSIS — I10 ESSENTIAL HYPERTENSION: Primary | ICD-10-CM

## 2018-06-25 NOTE — PATIENT INSTRUCTIONS
Arthritis: Care Instructions  Your Care Instructions  Arthritis, also called osteoarthritis, is a breakdown of the cartilage that cushions your joints. When the cartilage wears down, your bones rub against each other. This causes pain and stiffness. Many people have some arthritis as they age. Arthritis most often affects the joints of the spine, hands, hips, knees, or feet. You can take simple measures to protect your joints, ease your pain, and help you stay active. Follow-up care is a key part of your treatment and safety. Be sure to make and go to all appointments, and call your doctor if you are having problems. It's also a good idea to know your test results and keep a list of the medicines you take. How can you care for yourself at home? · Stay at a healthy weight. Being overweight puts extra strain on your joints. · Talk to your doctor or physical therapist about exercises that will help ease joint pain. ¨ Stretch. You may enjoy gentle forms of yoga to help keep your joints and muscles flexible. ¨ Walk instead of jog. Other types of exercise that are less stressful on the joints include riding a bicycle, swimming, barbara chi, or water exercise. ¨ Lift weights. Strong muscles help reduce stress on your joints. Stronger thigh muscles, for example, take some of the stress off of the knees and hips. Learn the right way to lift weights so you do not make joint pain worse. · Take your medicines exactly as prescribed. Call your doctor if you think you are having a problem with your medicine. · Take pain medicines exactly as directed. ¨ If the doctor gave you a prescription medicine for pain, take it as prescribed. ¨ If you are not taking a prescription pain medicine, ask your doctor if you can take an over-the-counter medicine. · Use a cane, crutch, walker, or another device if you need help to get around. These can help rest your joints.  You also can use other things to make life easier, such as a higher toilet seat and padded handles on kitchen utensils. · Do not sit in low chairs, which can make it hard to get up. · Put heat or cold on your sore joints as needed. Use whichever helps you most. You also can take turns with hot and cold packs. ¨ Apply heat 2 or 3 times a day for 20 to 30 minutes-using a heating pad, hot shower, or hot pack-to relieve pain and stiffness. ¨ Put ice or a cold pack on your sore joint for 10 to 20 minutes at a time. Put a thin cloth between the ice and your skin. When should you call for help? Call your doctor now or seek immediate medical care if:  ? · You have sudden swelling, warmth, or pain in any joint. ? · You have joint pain and a fever or rash. ? · You have such bad pain that you cannot use a joint. ? Watch closely for changes in your health, and be sure to contact your doctor if:  ? · You have mild joint symptoms that continue even with more than 6 weeks of care at home. ? · You have stomach pain or other problems with your medicine. Where can you learn more? Go to http://estrella-david.info/. Enter F859 in the search box to learn more about \"Arthritis: Care Instructions. \"  Current as of: October 31, 2016  Content Version: 11.4  © 9751-9017 [employer]. Care instructions adapted under license by [employer] (which disclaims liability or warranty for this information). If you have questions about a medical condition or this instruction, always ask your healthcare professional. [place] [address] any warranty or liability for your use of this information.

## 2018-06-25 NOTE — MR AVS SNAPSHOT
[address] [facility] [address] [address] [phone] Patient: Wilber Wu MRN: [id number] AJF:6[birth year]20 Visit Information Date & Time Provider Department Dept. Phone Encounter #  
 6/25/2018  8:20 AM Lazarus Sapp MD [address] [place] [address] [phone] [id number] Follow-up Instructions Return in about 3 months (around 9/25/2018). Follow-up and Disposition History Your Appointments 8/6/2018 10:20 AM  
FOLLOW UP 10 with Lazarus Sapp MD  
[facility] ([address]) Appt Note: [address] [id number] [address] [address] Upcoming Health Maintenance Date Due  
 EYE EXAM RETINAL OR DILATED Q1 7/1/1949 DTaP/Tdap/Td series (1 - Tdap) 7/1/1960 ZOSTER VACCINE AGE 60> 5/1/1999 GLAUCOMA SCREENING Q2Y 7/1/2004 Influenza Age 5 to Adult 8/1/2018 MEDICARE YEARLY EXAM 8/8/2018 HEMOGLOBIN A1C Q6M 9/5/2018 MICROALBUMIN Q1 11/20/2018 FOOT EXAM Q1 3/5/2019 LIPID PANEL Q1 3/5/2019 COLONOSCOPY 6/21/2021 Allergies as of 6/25/2018  Review Complete On: 6/25/2018 By: Lazarus Sapp MD  
  
 Severity Noted Reaction Type Reactions Caffeine  04/08/2011    Unknown (comments) Sweating AND WOOZY AND CAN'T SLEEP Codeine  04/07/2011    Other (comments) Lightheaded, WOOZY AND CAN'T SLEEP Percocet [Oxycodone-acetaminophen]  10/28/2015    Nausea and Vomiting Current Immunizations  Reviewed on 4/6/2016 Name Date Influenza High Dose Vaccine PF 10/24/2017 Influenza Vaccine 10/1/2016, 12/16/2015, 11/10/2014 Influenza Vaccine (Quad) PF 10/7/2015  8:47 AM  
 Pneumococcal Conjugate (PCV-13) 9/14/2015 Pneumococcal Vaccine (Unspecified Type) 10/17/2011 Not reviewed this visit You Were Diagnosed With   
  
 Codes Comments Essential hypertension    -  Primary ICD-10-CM: I10 
ICD-9-CM: 401.9 Controlled type 2 diabetes mellitus with diabetic polyneuropathy, without long-term current use of insulin (HCC)     ICD-10-CM: E11.42 
ICD-9-CM: 250.60, 357.2 Mixed hyperlipidemia     ICD-10-CM: E78.2 ICD-9-CM: 272.2 Primary osteoarthritis involving multiple joints     ICD-10-CM: M15.0 ICD-9-CM: 715.09 Cardiomyopathy, unspecified type ([facility] [address]     ICD-10-CM: I42.9 ICD-9-CM: 425.4 ASCVD (arteriosclerotic cardiovascular disease)     ICD-10-CM: I25.10 ICD-9-CM: 429.2, 440.9 Vitals BP Pulse Temp Resp Height(growth percentile) Weight(growth percentile) 112/80 (BP 1 Location: Left arm, BP Patient Position: Sitting) 60 97.3 °F (36.3 °C) (Oral) 17 4' 11\" (1.499 m) 119 lb (54 kg) SpO2 BMI Smoking Status 97% 24.04 kg/m2 Never Smoker Vitals History BMI and BSA Data Body Mass Index Body Surface Area 24.04 kg/m 2 1.5 m 2 Preferred Pharmacy Pharmacy Name Phone [facility] [address], [address] [place] [address] Avenue [phone] Your Updated Medication List  
  
   
This list is accurate as of 6/25/18  9:16 AM.  Always use your most recent med list.  
  
  
  
  
 aspirin 81 mg tablet Take 81 mg by mouth daily. Indications: taking one a day Blood-Glucose Meter Misc Commonly known as:  TRUE METRIX GLUCOSE METER Use daily as needed for diabetes management. Diagnosis E11.9 CIALIS 20 mg tablet Generic drug:  tadalafil Take 20 mg by mouth as needed. CO Q-10 PO Take  by mouth. * glucose blood VI test strips strip Commonly known as:  TRUE METRIX GLUCOSE TEST STRIP Use 1 to 2 times daily as needed for Diabetes management. Diagnosis is 411.9.  
  
 * glucose blood VI test strips strip Commonly known as:  TRUETEST TEST STRIPS  
by Does Not Apply route See Admin Instructions. meloxicam 7.5 mg tablet Commonly known as:  MOBIC Take 1 Tab by mouth daily. metoprolol succinate 25 mg XL tablet Commonly known as:  TOPROL-XL  
TAKE ONE TABLET BY MOUTH ONCE DAILY  
  
 ondansetron 4 mg disintegrating tablet Commonly known as:  ZOFRAN ODT Take 1 Tab by mouth every eight (8) hours as needed for Nausea. * OTHER Take 7 Tabs by mouth daily. [address]  
  
 * OTHER Nutratherm fat-furner stimulant  
  
 polyethylene glycol 17 gram/dose powder Commonly known as:  Wesley Spaniel  
as needed. ramipril 10 mg capsule Commonly known as:  ALTACE  
TAKE TWO CAPSULES BY MOUTH ONCE DAILY * Notice: This list has 4 medication(s) that are the same as other medications prescribed for you. Read the directions carefully, and ask your doctor or other care provider to review them with you. We Performed the Following AMB POC CK (CPK) [19524 CPT(R)] AMB POC COMPREHENSIVE METABOLIC PANEL [03429 CPT(R)] AMB POC HEMOGLOBIN A1C [49196 CPT(R)] AMB POC LIPID PROFILE [09747 CPT(R)] Follow-up Instructions Return in about 3 months (around 9/25/2018). Patient Instructions Arthritis: Care Instructions Your Care Instructions Arthritis, also called osteoarthritis, is a breakdown of the cartilage that cushions your joints. When the cartilage wears down, your bones rub against each other. This causes pain and stiffness. Many people have some arthritis as they age. Arthritis most often affects the joints of the spine, hands, hips, knees, or feet. You can take simple measures to protect your joints, ease your pain, and help you stay active. Follow-up care is a key part of your treatment and safety. Be sure to make and go to all appointments, and call your doctor if you are having problems. It's also a good idea to know your test results and keep a list of the medicines you take. How can you care for yourself at home? · Stay at a healthy weight. Being overweight puts extra strain on your joints. · Talk to your doctor or physical therapist about exercises that will help ease joint pain. ¨ Stretch. You may enjoy gentle forms of yoga to help keep your joints and muscles flexible. ¨ Walk instead of jog. Other types of exercise that are less stressful on the joints include riding a bicycle, swimming, barbara chi, or water exercise. ¨ Lift weights. Strong muscles help reduce stress on your joints. Stronger thigh muscles, for example, take some of the stress off of the knees and hips. Learn the right way to lift weights so you do not make joint pain worse. · Take your medicines exactly as prescribed. Call your doctor if you think you are having a problem with your medicine. · Take pain medicines exactly as directed. ¨ If the doctor gave you a prescription medicine for pain, take it as prescribed. ¨ If you are not taking a prescription pain medicine, ask your doctor if you can take an over-the-counter medicine. · Use a cane, crutch, walker, or another device if you need help to get around. These can help rest your joints. You also can use other things to make life easier, such as a higher toilet seat and padded handles on kitchen utensils. · Do not sit in low chairs, which can make it hard to get up. · Put heat or cold on your sore joints as needed. Use whichever helps you most. You also can take turns with hot and cold packs. ¨ Apply heat 2 or 3 times a day for 20 to 30 minutes-using a heating pad, hot shower, or hot pack-to relieve pain and stiffness. ¨ Put ice or a cold pack on your sore joint for 10 to 20 minutes at a time. Put a thin cloth between the ice and your skin. When should you call for help? Call your doctor now or seek immediate medical care if: 
? · You have sudden swelling, warmth, or pain in any joint. ? · You have joint pain and a fever or rash. ? · You have such bad pain that you cannot use a joint. ?Watch closely for changes in your health, and be sure to contact your doctor if: 
? · You have mild joint symptoms that continue even with more than 6 weeks of care at home. ? · You have stomach pain or other problems with your medicine. Where can you learn more? Go to http://estrella-david.info/. Enter O530 in the search box to learn more about \"Arthritis: Care Instructions. \" Current as of: October 31, 2016 Content Version: 11.4 © 2454-6714 [employer]. Care instructions adapted under license by [employer] (which disclaims liability or warranty for this information). If you have questions about a medical condition or this instruction, always ask your healthcare professional. [address] any warranty or liability for your use of this information. Patient Instructions History Introducing [facility]! [employer] introduces [employer] patient portal. Now you can access parts of your medical record, email your doctor's office, and request medication refills online. 1. In your internet browser, go to [email or web address] 2. Click on the First Time User? Click Here link in the Sign In box. You will see the New Member Sign Up page. 3. Enter your [employer] Access Code exactly as it appears below. You will not need to use this code after youve completed the sign-up process. If you do not sign up before the expiration date, you must request a new code. · [employer] Access Code: [email or web address] Expires: 9/23/2018  8:28 AM 
 
4. Enter the last four digits of your Social Security Number (xxxx) and Date of Birth (mm/dd/yyyy) as indicated and click Submit. You will be taken to the next sign-up page. 5. Create a [employer] ID. This will be your [employer] login ID and cannot be changed, so think of one that is secure and easy to remember. 6. Create a O[employer] password. You can change your password at any time. 7. Enter your Password Reset Question and Answer. This can be used at a later time if you forget your password. 8. Enter your e-mail address. You will receive e-mail notification when new information is available in [address] 9. Click Sign Up. You can now view and download portions of your medical record. 10. Click the Download Summary menu link to download a portable copy of your medical information. If you have questions, please visit the Frequently Asked Questions section of the [employer] website. Remember, [employer] is NOT to be used for urgent needs. For medical emergencies, dial 911. Now available from your iPhone and Android! Please provide this summary of care documentation to your next provider. Your primary care clinician is listed as Henry. If you have any questions after today's visit, please call [phone].

## 2018-06-25 NOTE — PROGRESS NOTES
Chief Complaint   Patient presents with    Hypertension     3 month follow up     Diabetes     1. Have you been to the ER, urgent care clinic since your last visit? Hospitalized since your last visit? No    2. Have you seen or consulted any other health care providers outside of the [address] [place] [address] since your last visit? Include any pap smears or colon screening.  No     Fasting

## 2018-06-25 NOTE — PROGRESS NOTES
Chief Complaint   Patient presents with    Hypertension     3 month follow up     Diabetes       SUBJECTIVE:    Jose Meyers is a 65 y.o. male who returns in follow-up for his medical problems include hypertension, diabetes, hyperlipidemia, cardiomyopathy, ASCVD, DJD, anxiety and other medical problems. He is taking his medications and trying to follow his diet and get some exercise on a regular basis. He currently denies any chest pain, shortness breath, palpitations or cardiorespiratory complaints. He denies any GI or  complaints. He has no headaches, dizziness or neurologic complaints. There are no current arthritic complaints and no other complaints on complete review of systems. Current Outpatient Prescriptions   Medication Sig Dispense Refill    meloxicam (MOBIC) 7.5 mg tablet Take 1 Tab by mouth daily. 30 Tab prn    metoprolol succinate (TOPROL-XL) 25 mg XL tablet TAKE ONE TABLET BY MOUTH ONCE DAILY 90 Tab 3    ramipril (ALTACE) 10 mg capsule TAKE TWO CAPSULES BY MOUTH ONCE DAILY 180 Cap 3    ondansetron (ZOFRAN ODT) 4 mg disintegrating tablet Take 1 Tab by mouth every eight (8) hours as needed for Nausea. 10 Tab 0    Blood-Glucose Meter (TRUE METRIX GLUCOSE METER) misc Use daily as needed for diabetes management. Diagnosis E11.9 1 Each 0    glucose blood VI test strips (TRUE METRIX GLUCOSE TEST STRIP) strip Use 1 to 2 times daily as needed for Diabetes management. Diagnosis is 411.9. 50 Strip 5    glucose blood VI test strips (TRUETEST TEST STRIPS) strip by Does Not Apply route See Admin Instructions. 100 Strip prn    polyethylene glycol (MIRALAX) 17 gram/dose powder as needed.  UBIDECARENONE (CO Q-10 PO) Take  by mouth.  OTHER Nutratherm fat-furner stimulant      tadalafil (CIALIS) 20 mg tablet Take 20 mg by mouth as needed.  OTHER Take 7 Tabs by mouth daily. HEART HEALTH FROM [employer]      aspirin 81 mg tablet Take 81 mg by mouth daily.  Indications: taking one a day       Past Medical History:   Diagnosis Date    Anxiety 8/5/2017    Arrhythmia     Arthritis     Atherosclerotic heart disease of native coronary artery without angina pectoris 8/5/2017    Bradycardia 8/5/2017    CAD (coronary artery disease)     Constipation, chronic 8/5/2017    Diabetes ([facility] [address]     diet controlled    Diabetes mellitus ([facility] [address] 8/5/2017    Diverticulosis 8/5/2017    ED (erectile dysfunction) 8/5/2017    GERD (gastroesophageal reflux disease)     Hypertension     Hypogonadism male 8/5/2017    Kidney stone     Neuropathy 8/5/2017    On statin therapy 8/5/2017    Right foot pain 8/5/2017     Past Surgical History:   Procedure Laterality Date    CARDIAC SURG PROCEDURE UNLIST      cabg x4 vessels 1999    COLONOSCOPY N/A 6/21/2016    COLONOSCOPY performed by Catracho Christianson MD at [address]; HI RISK IND  6/21/2016         HX GI      COLONOSCOPY    HX HEART CATHETERIZATION      HX OTHER SURGICAL      artificial testicle    HX PACEMAKER  10/6/15    CT COLONOSCOPY FLX DX W/COLLJ SPEC WHEN PFRMD  4/8/2011         UPPER GI ENDOSCOPY,BIOPSY  12/14/2016          Allergies   Allergen Reactions    Caffeine Unknown (comments)     Sweating AND WOOZY AND CAN'T SLEEP    Codeine Other (comments)     Lightheaded, WOOZY AND CAN'T SLEEP    Percocet [Oxycodone-Acetaminophen] Nausea and Vomiting       REVIEW OF SYSTEMS:  General: negative for - chills or fever, or weight loss or gain  ENT: negative for - headaches, nasal congestion or tinnitus  Eyes: no blurred or visual changes  Neck: No stiffness or swollen nodes  Respiratory: negative for - cough, hemoptysis, shortness of breath or wheezing  Cardiovascular : negative for - chest pain, edema, palpitations or shortness of breath  Gastrointestinal: negative for - abdominal pain, blood in stools, heartburn or nausea/vomiting  Genito-Urinary: no dysuria, trouble voiding, or hematuria  Musculoskeletal: negative for - gait disturbance, joint pain, joint stiffness or joint swelling  Neurological: no TIA or stroke symptoms  Hematologic: no bruises, no bleeding  Lymphatic: no swollen glands  Integument: no lumps, mole changes, nail changes or rash  Endocrine:no malaise/lethargy poly uria or polydipsia or unexpected weight changes        Social History     Social History    Marital status:      Spouse name: N/A    Number of children: N/A    Years of education: N/A     Social History Main Topics    Smoking status: Never Smoker    Smokeless tobacco: Never Used    Alcohol use No    Drug use: No    Sexual activity: Not Asked     Other Topics Concern    None     Social History Narrative     Family History   Problem Relation Age of Onset    Heart Disease Mother     No Known Problems Father     Cancer Sister     Diabetes Brother     Anesth Problems Neg Hx        OBJECTIVE:     Visit Vitals    /80 (BP 1 Location: Left arm, BP Patient Position: Sitting)    Pulse 60    Temp 97.3 °F (36.3 °C) (Oral)    Resp 17    Ht 4' 11\" (1.499 m)    Wt 119 lb (54 kg)    SpO2 97%    BMI 24.04 kg/m2     CONSTITUTIONAL:   well nourished, appears age appropriate  EYES: sclera anicteric, PERRL, EOMI  ENMT:nares clear, moist mucous membranes, pharynx clear  NECK: supple. Thyroid normal, No JVD or bruits  RESPIRATORY: Chest: clear to ascultation and percussion, normal inspiratory effort  CARDIOVASCULAR: Heart: regular rate and rhythm no murmurs, rubs or gallops, PMI not displaced, No thrills  GASTROINTESTINAL: Abdomen: non distended, soft, non tender, bowel sounds normal  HEMATOLOGIC: no purpura, petechiae or bruising  LYMPHATIC: No lymph node enlargemant  MUSCULOSKELETAL: Extremities: no edema or active synovitis, pulse 1+   INTEGUMENT: No unusual rashes or suspicious skin lesions noted.  Nails appear normal.  PERIPHERAL VASCULAR: normal pulses femoral, PT and DP  NEUROLOGIC: non-focal exam, A & O X 3  PSYCHIATRIC:, appropriate affect     ASSESSMENT:   1. Essential hypertension    2. Controlled type 2 diabetes mellitus with diabetic polyneuropathy, without long-term current use of insulin ([address]    3. Mixed hyperlipidemia    4. Primary osteoarthritis involving multiple joints    5. Cardiomyopathy, unspecified type ([address]    6. ASCVD (arteriosclerotic cardiovascular disease)      Impression  1. Hypertension that is well controlled continue current therapy reviewed with him  2. Diabetes repeat status pending reviewed prior labs which have been excellent will make adjustments if necessary based on today's lab  3. Hyperlipidemia prior labs reviewed repeat status pending will adjust if needed  4. DJD that is stable  5. Cardiomyopathy clinically stable  6. ASCVD continue aspirin daily  I will call the lab and make further recommendations adjustments if necessary. Follow stable continue same and follow-up in 3 months or sooner should they be a problem. PLAN:  .  Orders Placed This Encounter    AMB POC LIPID PROFILE    AMB POC COMPREHENSIVE METABOLIC PANEL    AMB POC CK (CPK)    AMB POC HEMOGLOBIN A1C         ATTENTION:   This medical record was transcribed using an electronic medical records system. Although proofread, it may and can contain electronic and spelling errors. Other human spelling and other errors may be present. Corrections may be executed at a later time. Please feel free to contact us for any clarifications as needed. Follow-up Disposition:  Return in about 3 months (around 9/25/2018). No results found for any visits on 06/25/18. Daksha Monae MD    The patient verbalized understanding of the problems and plans as explained.

## 2018-06-26 LAB
ALBUMIN SERPL-MCNC: 4.1 G/DL (ref 3.9–5.4)
ALKALINE PHOS POC: 74 U/L (ref 38–126)
ALT SERPL-CCNC: 46 U/L (ref 9–52)
AST SERPL-CCNC: 39 U/L (ref 14–36)
BUN BLD-MCNC: 19 MG/DL (ref 9–20)
CALCIUM BLD-MCNC: 10.7 MG/DL (ref 8.4–10.2)
CHLORIDE BLD-SCNC: 101 MMOL/L (ref 98–107)
CHOLEST SERPL-MCNC: 193 MG/DL (ref 0–200)
CK (CPK) POC: 104 U/L (ref 30–135)
CO2 POC: 30 MMOL/L (ref 22–32)
CREAT BLD-MCNC: 0.7 MG/DL (ref 0.8–1.5)
EGFR (POC): 90.4
GLUCOSE POC: 88 MG/DL (ref 75–110)
HBA1C MFR BLD HPLC: 5.8 % (ref 4.5–5.7)
HDLC SERPL-MCNC: 70 MG/DL (ref 35–130)
LDL CHOLESTEROL POC: 107.2 MG/DL (ref 0–130)
POTASSIUM SERPL-SCNC: 5.5 MMOL/L (ref 3.6–5)
PROT SERPL-MCNC: 7.8 G/DL (ref 6.3–8.2)
SODIUM SERPL-SCNC: 141 MMOL/L (ref 137–145)
TCHOL/HDL RATIO (POC): 2.8 (ref 0–4)
TOTAL BILIRUBIN POC: 1.2 MG/DL (ref 0.2–1.3)
TRIGL SERPL-MCNC: 79 MG/DL (ref 0–200)
VLDLC SERPL CALC-MCNC: 15.8 MG/DL

## 2018-08-06 ENCOUNTER — OFFICE VISIT (OUTPATIENT)
Dept: INTERNAL MEDICINE CLINIC | Age: 78
End: 2018-08-06

## 2018-08-06 VITALS
DIASTOLIC BLOOD PRESSURE: 68 MMHG | BODY MASS INDEX: 22.93 KG/M2 | WEIGHT: 116.8 LBS | HEART RATE: 60 BPM | SYSTOLIC BLOOD PRESSURE: 118 MMHG | RESPIRATION RATE: 19 BRPM | HEIGHT: 60 IN | OXYGEN SATURATION: 98 % | TEMPERATURE: 97.7 F

## 2018-08-06 DIAGNOSIS — I10 ESSENTIAL HYPERTENSION: Primary | ICD-10-CM

## 2018-08-06 DIAGNOSIS — I42.9 CARDIOMYOPATHY, UNSPECIFIED TYPE (HCC): ICD-10-CM

## 2018-08-06 DIAGNOSIS — F41.9 ANXIETY: ICD-10-CM

## 2018-08-06 DIAGNOSIS — E78.2 MIXED HYPERLIPIDEMIA: ICD-10-CM

## 2018-08-06 DIAGNOSIS — M15.9 PRIMARY OSTEOARTHRITIS INVOLVING MULTIPLE JOINTS: ICD-10-CM

## 2018-08-06 DIAGNOSIS — E11.42 CONTROLLED TYPE 2 DIABETES MELLITUS WITH DIABETIC POLYNEUROPATHY, WITHOUT LONG-TERM CURRENT USE OF INSULIN (HCC): ICD-10-CM

## 2018-08-06 DIAGNOSIS — Z00.00 MEDICARE ANNUAL WELLNESS VISIT, SUBSEQUENT: ICD-10-CM

## 2018-08-06 DIAGNOSIS — I25.10 ASCVD (ARTERIOSCLEROTIC CARDIOVASCULAR DISEASE): ICD-10-CM

## 2018-08-06 LAB
BACTERIA UA POCT, BACTPOCT: NORMAL
BILIRUB UR QL STRIP: NEGATIVE
CASTS UA POCT: 0
CLUE CELLS, CLUEPOCT: NEGATIVE
CRYSTALS UA POCT, CRYSPOCT: NEGATIVE
EPITHELIAL CELLS POCT: NORMAL
GLUCOSE UR-MCNC: NEGATIVE MG/DL
GRAN# POC: 4 K/UL (ref 2–7.8)
GRAN% POC: 57.2 % (ref 37–92)
HCT VFR BLD CALC: 49.3 % (ref 37–51)
HGB BLD-MCNC: 16.4 G/DL (ref 12–18)
KETONES P FAST UR STRIP-MCNC: NEGATIVE MG/DL
LY# POC: 2.5 K/UL (ref 0.6–4.1)
LY% POC: 37.9 % (ref 10–58.5)
MCH RBC QN: 32.5 PG (ref 26–32)
MCHC RBC-ENTMCNC: 33.2 G/DL (ref 30–36)
MCV RBC: 98 FL (ref 80–97)
MICROALBUMIN UR TEST STR-MCNC: NEGATIVE MG/L (ref 0–20)
MID #, POC: 0.3 K/UL (ref 0–1.8)
MID% POC: 4.9 % (ref 0.1–24)
MUCUS UA POCT, MUCPOCT: NORMAL
PH UR STRIP: 6 [PH] (ref 5–7)
PLATELET # BLD: 183 K/UL (ref 140–440)
PROT UR QL STRIP: NEGATIVE
RBC # BLD: 5.03 M/UL (ref 4.2–6.3)
RBC UA POCT, RBCPOCT: NORMAL
SP GR UR STRIP: 1.01 (ref 1.01–1.02)
TRICH UA POCT, TRICHPOC: NEGATIVE
UA UROBILINOGEN AMB POC: NORMAL (ref 0.2–1)
URINALYSIS CLARITY POC: CLEAR
URINALYSIS COLOR POC: NORMAL
URINE BLOOD POC: NORMAL
URINE CULT COMMENT, POCT: NORMAL
URINE LEUKOCYTES POC: NORMAL
URINE NITRITES POC: NEGATIVE
WBC # BLD: 6.8 K/UL (ref 4.1–10.9)
WBC UA POCT, WBCPOCT: NORMAL
YEAST UA POCT, YEASTPOC: NEGATIVE

## 2018-08-06 NOTE — PROGRESS NOTES
This is a Subsequent Medicare Annual Wellness Visit providing Personalized Prevention Plan Services (PPPS) (Performed 12 months after initial AWV and PPPS )    I have reviewed the patient's medical history in detail and updated the computerized patient record. He presents today for subsequent annual Medicare wellness examination screening questionnaire. He is also here in follow-up of his medical problems include hypertension, diabetes, hyperlipidemia, ASCVD with ischemic cardiomyopathy, GERD, diverticulosis, and anxiety. He is taking his medications and trying to follow his diet and get some exercise. He currently denies any chest pain, shortness breath, palpitations or cardiorespiratory complaints. He denies any GI or  complaints. He denies any headaches, dizziness or neurological planes. There are no current arthritic complaints and then no other complaints on complete review of systems.     History     Past Medical History:   Diagnosis Date    Anxiety 8/5/2017    Arrhythmia     Arthritis     Atherosclerotic heart disease of native coronary artery without angina pectoris 8/5/2017    Bradycardia 8/5/2017    CAD (coronary artery disease)     Constipation, chronic 8/5/2017    Diabetes ([facility] [address]     diet controlled    Diabetes mellitus ([facility] [address] 8/5/2017    Diverticulosis 8/5/2017    ED (erectile dysfunction) 8/5/2017    GERD (gastroesophageal reflux disease)     Hypertension     Hypogonadism male 8/5/2017    Kidney stone     Neuropathy 8/5/2017    On statin therapy 8/5/2017    Right foot pain 8/5/2017      Past Surgical History:   Procedure Laterality Date    CARDIAC SURG PROCEDURE UNLIST      cabg x4 vessels 1999    COLONOSCOPY N/A 6/21/2016    COLONOSCOPY performed by Francheska Mcdonald MD at [facility]; HI RISK IND  6/21/2016         HX GI      COLONOSCOPY    HX HEART CATHETERIZATION      HX OTHER SURGICAL      artificial testicle    HX PACEMAKER  10/6/15  SC COLONOSCOPY FLX DX W/COLLJ SPEC WHEN PFRMD  4/8/2011         UPPER GI ENDOSCOPY,BIOPSY  12/14/2016          Social History   Substance Use Topics    Smoking status: Never Smoker    Smokeless tobacco: Never Used    Alcohol use No     Current Outpatient Prescriptions   Medication Sig Dispense Refill    metoprolol succinate (TOPROL-XL) 25 mg XL tablet TAKE ONE TABLET BY MOUTH ONCE DAILY 90 Tab 3    ramipril (ALTACE) 10 mg capsule TAKE TWO CAPSULES BY MOUTH ONCE DAILY 180 Cap 3    Blood-Glucose Meter (TRUE METRIX GLUCOSE METER) misc Use daily as needed for diabetes management. Diagnosis E11.9 1 Each 0    glucose blood VI test strips (TRUE METRIX GLUCOSE TEST STRIP) strip Use 1 to 2 times daily as needed for Diabetes management. Diagnosis is 411.9. 50 Strip 5    glucose blood VI test strips (TRUETEST TEST STRIPS) strip by Does Not Apply route See Admin Instructions. 100 Strip prn    UBIDECARENONE (CO Q-10 PO) Take  by mouth.  OTHER Take 7 Tabs by mouth daily. HEART HEALTH FROM [employer]      aspirin 81 mg tablet Take 81 mg by mouth daily. Indications: taking one a day      ondansetron (ZOFRAN ODT) 4 mg disintegrating tablet Take 1 Tab by mouth every eight (8) hours as needed for Nausea. 10 Tab 0    polyethylene glycol (MIRALAX) 17 gram/dose powder as needed.  tadalafil (CIALIS) 20 mg tablet Take 20 mg by mouth as needed.        Allergies   Allergen Reactions    Caffeine Unknown (comments)     Sweating AND WOOZY AND CAN'T SLEEP    Codeine Other (comments)     Lightheaded, WOOZY AND CAN'T SLEEP    Percocet [Oxycodone-Acetaminophen] Nausea and Vomiting     Family History   Problem Relation Age of Onset    Heart Disease Mother     No Known Problems Father     Cancer Sister     Diabetes Brother     Anesth Problems Neg Hx        Patient Active Problem List    Diagnosis    Controlled type 2 diabetes mellitus with diabetic polyneuropathy, without long-term current use of insulin ([address]    Cardiomyopathy ([address]    ASCVD (arteriosclerotic cardiovascular disease)    Essential hypertension    Mixed hyperlipidemia    Primary osteoarthritis involving multiple joints    Anxiety    Pain of right hand    Epigastric pain    Medicare annual wellness visit, subsequent    Colon cancer screening    Diverticulosis    Neuropathy    Hypogonadism male    ED (erectile dysfunction)    Constipation, chronic    Bradycardia       Patient Care Team:  Phuong Pinto MD as PCP - General (Internal Medicine)    Depression Risk Factor Screening:     PHQ over the last two weeks 8/6/2018   Little interest or pleasure in doing things Not at all   Feeling down, depressed, irritable, or hopeless Not at all   Total Score PHQ 2 0     Alcohol Risk Factor Screening: You do not drink alcohol or very rarely. Functional Ability and Level of Safety:     Fall Risk     Fall Risk Assessment, last 12 mths 8/6/2018   Able to walk? Yes   Fall in past 12 months? No       Hearing Loss   mild    Activities of Daily Living   Self-care. ADL Assessment 8/6/2018   Feeding yourself No Help Needed   Getting from bed to chair No Help Needed   Getting dressed No Help Needed   Bathing or showering No Help Needed   Walk across the room (includes cane/walker) No Help Needed   Using the telphone No Help Needed   Taking your medications No Help Needed   Preparing meals No Help Needed   Managing money (expenses/bills) No Help Needed   Moderately strenuous housework (laundry) No Help Needed   Shopping for personal items (toiletries/medicines) No Help Needed   Shopping for groceries No Help Needed   Driving No Help Needed   Climbing a flight of stairs No Help Needed   Getting to places beyond walking distances No Help Needed       Abuse Screen   Patient is not abused    Social History     Social History Narrative       Review of Systems      ROS:    Constitutional: He denies fevers, weight loss, sweats.   Eyes: No blurred or double vision. ENT: No difficulty with swallowing, taste, speech or smell. Neck: no stiffness or swelling  Respiratory: No cough wheezing or shortness of breath. Cardiovascular: Denies chest pain, palpitations, unexplained indigestion or syncope. Gastrointestinal:  No changes in bowel movements, no abdominal pain, no bloating. Genitourinary:  He denies frequency, nocturia or stranguria. Extremities: No joint pain, stiffness or swelling. Neurological:  No numbness, tingling, burring paresthesias or loss of motor strength. No syncope, dizziness or frequent headache  Lymphatic: no adenopathy noted  Hematologic: no easy bruising or bleeding gums  Skin:  No recent rashes or mole changes. Psychiatric/Behavioral:  Negative for depression. Physical Examination     Evaluation of Cognitive Function:  Mood/affect:  happy  Appearance: age appropriate  Family member/caregiver input: none    Visit Vitals    /68 (BP 1 Location: Right arm, BP Patient Position: Sitting)    Pulse 60    Temp 97.7 °F (36.5 °C) (Oral)    Resp 19    Ht 4' 11\" (1.499 m)    Wt 116 lb 12.8 oz (53 kg)    SpO2 98%    BMI 23.59 kg/m2     Vitals:    08/06/18 1041   BP: 118/68   Pulse: 60   Resp: 19   Temp: 97.7 °F (36.5 °C)   TempSrc: Oral   SpO2: 98%   Weight: 116 lb 12.8 oz (53 kg)   Height: 4' 11\" (1.499 m)   PainSc:   0 - No pain        PHYSICAL EXAM:    General appearance - alert, well appearing, and in no distress  Mental status - alert, oriented to person, place, and time  HEENT:  Ears - bilateral TM's and external ear canals clear  Eyes - pupillary responses were normal.  Extraocular muscle function intact. Lids and conjunctiva not injected. Fundoscopic exam revealed sharp disc margins. eye movements intact  Pharynx- clear with teeth in good repair. No masses were noted  Neck - supple without thyromegaly or burit. No JVD noted  Lungs - clear to auscultation and percussion  Cardiac- normal rate, regular rhythm without murmurs. PMI not displaced. No gallop, rub or click  Abdomen - flat, soft, non-tender without palpable organomegaly or mass. No pulsatile mass was felt, and not bruit was heard. Bowel sounds were active  : Circumcised, Testes descended w/o masses  Rectal: normal sphincter tone, prostate normal, no masses, stool brown and hemacult negative  Extremities -  no clubbing cyanosis or edema  Lymphatics - no palpable lymphadenopathy, no hepatosplenomegaly  Hematologic: no petechiae or purpura  Peripheral vascular -Femoral, Dorsalis pedis and posterior tibial pulses felt without difficulty  Skin - no rash or unusual mole change noted  Neurological - Cranial nerves II-XII grossly intact. Motor strength 5/5. DTR's 2+ and symmetric.   Station and gait normal  Back exam - full range of motion, no tenderness, palpable spasm or pain on motion  Musculoskeletal - no joint tenderness, deformity or swelling      Results for orders placed or performed in visit on 06/25/18   AMB POC LIPID PROFILE   Result Value Ref Range    Cholesterol (POC) 193 0 - 200 mg/dL    Triglycerides (POC) 79 0 - 200 mg/dL    HDL Cholesterol (POC) 70 35 - 130 mg/dL    VLDL (POC) 15.8 MG/DL    LDL Cholesterol (POC) 107.2 0.0 - 130.0 MG/DL    TChol/HDL Ratio (POC) 2.8 0.0 - 4.0   AMB POC COMPREHENSIVE METABOLIC PANEL   Result Value Ref Range    GLUCOSE 88 75 - 110 mg/dL    BUN 19 9 - 20 mg/dL    Creatinine (POC) 0.7 (A) 0.8 - 1.5 mg/dL    Sodium (POC) 141 137 - 145 MMOL/L    Potassium (POC) 5.5 (A) 3.6 - 5.0 MMOL/L    CHLORIDE 101 98 - 107 MMOL/L    CO2 30 22 - 32 MMOL/L    CALCIUM 10.7 (A) 8.4 - 10.2 mg/dL    TOTAL PROTEIN 7.8 6.3 - 8.2 g/dL    ALBUMIN 4.1 3.9 - 5.4 g/dL    AST (POC) 39 (A) 14 - 36 U/L    ALT (POC) 46 9 - 52 U/L    ALKALINE PHOS 74 38 - 126 U/L    TOTAL BILIRUBIN 1.2 0.2 - 1.3 mg/dL    eGFR (POC) 90.4    AMB POC CK (CPK)   Result Value Ref Range    CK (CPK) (POC) 104 30 - 135 U/L   AMB POC HEMOGLOBIN A1C   Result Value Ref Range    Hemoglobin A1c (POC) 5.8 (A) 4.5 - 5.7 %       Advice/Referrals/Counseling   Education and counseling provided:  Are appropriate based on today's review and evaluation  End-of-Life planning (with patient's consent)  Pneumococcal Vaccine  Influenza Vaccine  Colorectal cancer screening tests      Assessment/Plan     ASSESSMENT:   1. Essential hypertension    2. Controlled type 2 diabetes mellitus with diabetic polyneuropathy, without long-term current use of insulin ([facility] [address]    3. Mixed hyperlipidemia    4. Cardiomyopathy, unspecified type ([facility] [address]    5. ASCVD (arteriosclerotic cardiovascular disease)    6. Primary osteoarthritis involving multiple joints    7. Anxiety    8. Medicare annual wellness visit, subsequent      Impression  1. Hypertension that is well controlled to continue current therapy reviewed with him  2. Diabetes I reviewed his last A1c of 5.8 which is good so we will continue current treatment which I reviewed with him. I will make further recommendations of a need to based upon today's fasting blood sugar. 3.  Hyperlipidemia reviewed his recent labs within an HDL of 70 although LDL was a little high with an HDL 70 I am not prone to change his treatment. 4.  Cardiomyopathy stable  5. ASCVD clinically stable and EKG today reveals no acute process so continue aspirin daily. He shows paced rhythm at 60 without acute process  6. DJD that is stable  7. Anxiety that is stable  Medicare annual wellness examination screening questionnaire completed today. The results were reviewed with him and his questions were answered. Lifestyle recommendations and modifications discussed and made. I will call with lab results and make further recommendations or adjustments if necessary. If all is stable we will continue the same and I will recheck him in 3 months or sooner should they be a problem.     PLAN:  .  Orders Placed This Encounter    T4, FREE    TSH 3RD GENERATION    METABOLIC PANEL, COMPREHENSIVE    AMB POC URINE, MICROALBUMIN, SEMIQUANT (1 RESULT)    AMB POC URINALYSIS DIP STICK AUTO W/ MICRO     AMB POC COMPLETE CBC,AUTOMATED ENTER    AMB POC EKG ROUTINE W/ 12 LEADS, INTER & REP         ATTENTION:   This medical record was transcribed using an electronic medical records system. Although proofread, it may and can contain electronic and spelling errors. Other human spelling and other errors may be present. Corrections may be executed at a later time. Please feel free to contact us for any clarifications as needed. Follow-up Disposition:  Return in about 3 months (around 11/6/2018). Karli Florez MD    Recommended healthy diet low in carbohydrates, fats, sodium and cholesterol. Recommended regular cardiovascular exercise 3-6 times per week for 30-60 minutes daily. Current Outpatient Prescriptions   Medication Sig Dispense Refill    metoprolol succinate (TOPROL-XL) 25 mg XL tablet TAKE ONE TABLET BY MOUTH ONCE DAILY 90 Tab 3    ramipril (ALTACE) 10 mg capsule TAKE TWO CAPSULES BY MOUTH ONCE DAILY 180 Cap 3    Blood-Glucose Meter (TRUE METRIX GLUCOSE METER) misc Use daily as needed for diabetes management. Diagnosis E11.9 1 Each 0    glucose blood VI test strips (TRUE METRIX GLUCOSE TEST STRIP) strip Use 1 to 2 times daily as needed for Diabetes management. Diagnosis is 411.9. 50 Strip 5    glucose blood VI test strips (TRUETEST TEST STRIPS) strip by Does Not Apply route See Admin Instructions. 100 Strip prn    UBIDECARENONE (CO Q-10 PO) Take  by mouth.  OTHER Take 7 Tabs by mouth daily. HEART HEALTH FROM [employer]      aspirin 81 mg tablet Take 81 mg by mouth daily. Indications: taking one a day      ondansetron (ZOFRAN ODT) 4 mg disintegrating tablet Take 1 Tab by mouth every eight (8) hours as needed for Nausea. 10 Tab 0    polyethylene glycol (MIRALAX) 17 gram/dose powder as needed.  tadalafil (CIALIS) 20 mg tablet Take 20 mg by mouth as needed.          No results found for any visits on 08/06/18. Verbal and written instructions (see AVS) provided. Patient expresses understanding of diagnosis and treatment plan.     Mehran Faulkner MD

## 2018-08-06 NOTE — MR AVS SNAPSHOT
[address] [place] [address] 70 [address] [address] [phone] Patient: Irina Hendricks MRN: [id number] CIE:8/5/6805 Visit Information Date & Time Provider Department Dept. Phone Encounter #  
 8/6/2018 10:20 AM Pilo Hanson MD [place] [address] [phone] [id number] Follow-up Instructions Return in about 3 months (around 11/6/2018). Follow-up and Disposition History Upcoming Health Maintenance Date Due  
 EYE EXAM RETINAL OR DILATED Q1 7/1/1950 DTaP/Tdap/Td series (1 - Tdap) 7/1/1960 ZOSTER VACCINE AGE 60> 5/1/1999 GLAUCOMA SCREENING Q2Y 7/1/2004 Influenza Age 5 to Adult 8/1/2018 MICROALBUMIN Q1 11/20/2018 HEMOGLOBIN A1C Q6M 12/25/2018 FOOT EXAM Q1 3/5/2019 LIPID PANEL Q1 6/25/2019 MEDICARE YEARLY EXAM 8/7/2019 COLONOSCOPY 6/21/2021 Allergies as of 8/6/2018  Review Complete On: 8/6/2018 By: Pilo Hanson MD  
  
 Severity Noted Reaction Type Reactions Caffeine  04/08/2011    Unknown (comments) Sweating AND WOOZY AND CAN'T SLEEP Codeine  04/07/2011    Other (comments) Lightheaded, WOOZY AND CAN'T SLEEP Percocet [Oxycodone-acetaminophen]  10/28/2015    Nausea and Vomiting Current Immunizations  Reviewed on 4/6/2016 Name Date Influenza High Dose Vaccine PF 10/24/2017 Influenza Vaccine 10/1/2016, 12/16/2015, 11/10/2014 Influenza Vaccine (Quad) PF 10/7/2015  8:47 AM  
 Pneumococcal Conjugate (PCV-13) 9/14/2015 Pneumococcal Vaccine (Unspecified Type) 10/17/2011 Not reviewed this visit You Were Diagnosed With   
  
 Codes Comments Essential hypertension    -  Primary ICD-10-CM: I10 
ICD-9-CM: 401.9 Controlled type 2 diabetes mellitus with diabetic polyneuropathy, without long-term current use of insulin (HCC)     ICD-10-CM: E11.42 
ICD-9-CM: 250.60, 357.2 Mixed hyperlipidemia     ICD-10-CM: E78.2 ICD-9-CM: 272.2 Cardiomyopathy, unspecified type ([facility]     ICD-10-CM: I42.9 ICD-9-CM: 425.4 ASCVD (arteriosclerotic cardiovascular disease)     ICD-10-CM: I25.10 ICD-9-CM: 429.2, 440.9 Primary osteoarthritis involving multiple joints     ICD-10-CM: M15.0 ICD-9-CM: 715.09 Anxiety     ICD-10-CM: F41.9 ICD-9-CM: 300.00 Medicare annual wellness visit, subsequent     ICD-10-CM: Z00.00 ICD-9-CM: V70.0 Vitals BP Pulse Temp Resp Height(growth percentile) Weight(growth percentile)  
 118/68 (BP 1 Location: Right arm, BP Patient Position: Sitting) 60 97.7 °F (36.5 °C) (Oral) 19 4' 11\" (1.499 m) 116 lb 12.8 oz (53 kg) SpO2 BMI Smoking Status 98% 23.59 kg/m2 Never Smoker Vitals History BMI and BSA Data Body Mass Index Body Surface Area  
 23.59 kg/m 2 1.49 m 2 Preferred Pharmacy Pharmacy Name Phone [facility] [address], [address] [place] [address] Avenue [phone] Your Updated Medication List  
  
   
This list is accurate as of 8/6/18 11:37 AM.  Always use your most recent med list.  
  
  
  
  
 aspirin 81 mg tablet Take 81 mg by mouth daily. Indications: taking one a day Blood-Glucose Meter Misc Commonly known as:  TRUE METRIX GLUCOSE METER Use daily as needed for diabetes management. Diagnosis E11.9 CIALIS 20 mg tablet Generic drug:  tadalafil Take 20 mg by mouth as needed. CO Q-10 PO Take  by mouth. * glucose blood VI test strips strip Commonly known as:  TRUE METRIX GLUCOSE TEST STRIP Use 1 to 2 times daily as needed for Diabetes management. Diagnosis is 411.9.  
  
 * glucose blood VI test strips strip Commonly known as:  TRUETEST TEST STRIPS  
by Does Not Apply route See Admin Instructions. metoprolol succinate 25 mg XL tablet Commonly known as:  TOPROL-XL  
TAKE ONE TABLET BY MOUTH ONCE DAILY  
  
 ondansetron 4 mg disintegrating tablet Commonly known as:  ZOFRAN ODT  
 Take 1 Tab by mouth every eight (8) hours as needed for Nausea. OTHER Take 7 Tabs by mouth daily. [address] polyethylene glycol 17 gram/dose powder Commonly known as:  Reginia Crazier  
as needed. ramipril 10 mg capsule Commonly known as:  ALTACE  
TAKE TWO CAPSULES BY MOUTH ONCE DAILY * Notice: This list has 2 medication(s) that are the same as other medications prescribed for you. Read the directions carefully, and ask your doctor or other care provider to review them with you. We Performed the Following AMB POC COMPLETE CBC,AUTOMATED ENTER [id number] CPT(R)] AMB POC EKG ROUTINE W/ 12 LEADS, INTER & REP [59461 CPT(R)] AMB POC URINALYSIS DIP STICK AUTO W/ MICRO  [95527 CPT(R)] AMB POC URINE, MICROALBUMIN, SEMIQUANT (1 RESULT) [34644 CPT(R)] METABOLIC PANEL, COMPREHENSIVE [09995 CPT(R)] T4, FREE [id number] CPT(R)] TSH 3RD GENERATION [98622 CPT(R)] Follow-up Instructions Return in about 3 months (around 11/6/2018). Patient Instructions Arthritis: Care Instructions Your Care Instructions Arthritis, also called osteoarthritis, is a breakdown of the cartilage that cushions your joints. When the cartilage wears down, your bones rub against each other. This causes pain and stiffness. Many people have some arthritis as they age. Arthritis most often affects the joints of the spine, hands, hips, knees, or feet. You can take simple measures to protect your joints, ease your pain, and help you stay active. Follow-up care is a key part of your treatment and safety. Be sure to make and go to all appointments, and call your doctor if you are having problems. It's also a good idea to know your test results and keep a list of the medicines you take. How can you care for yourself at home? · Stay at a healthy weight. Being overweight puts extra strain on your joints. · Talk to your doctor or physical therapist about exercises that will help ease joint pain. ¨ Stretch. You may enjoy gentle forms of yoga to help keep your joints and muscles flexible. ¨ Walk instead of jog. Other types of exercise that are less stressful on the joints include riding a bicycle, swimming, barbara chi, or water exercise. ¨ Lift weights. Strong muscles help reduce stress on your joints. Stronger thigh muscles, for example, take some of the stress off of the knees and hips. Learn the right way to lift weights so you do not make joint pain worse. · Take your medicines exactly as prescribed. Call your doctor if you think you are having a problem with your medicine. · Take pain medicines exactly as directed. ¨ If the doctor gave you a prescription medicine for pain, take it as prescribed. ¨ If you are not taking a prescription pain medicine, ask your doctor if you can take an over-the-counter medicine. · Use a cane, crutch, walker, or another device if you need help to get around. These can help rest your joints. You also can use other things to make life easier, such as a higher toilet seat and padded handles on kitchen utensils. · Do not sit in low chairs, which can make it hard to get up. · Put heat or cold on your sore joints as needed. Use whichever helps you most. You also can take turns with hot and cold packs. ¨ Apply heat 2 or 3 times a day for 20 to 30 minutes-using a heating pad, hot shower, or hot pack-to relieve pain and stiffness. ¨ Put ice or a cold pack on your sore joint for 10 to 20 minutes at a time. Put a thin cloth between the ice and your skin. When should you call for help?  
Call your doctor now or seek immediate medical care if: 
  · You have sudden swelling, warmth, or pain in any joint.  
  · You have joint pain and a fever or rash.  
  · You have such bad pain that you cannot use a joint.  
 Watch closely for changes in your health, and be sure to contact your doctor if: 
  · You have mild joint symptoms that continue even with more than 6 weeks of care at home.  
  · You have stomach pain or other problems with your medicine. Where can you learn more? Go to http://estrella-david.info/. Enter Q506 in the search box to learn more about \"Arthritis: Care Instructions. \" Current as of: October 10, 2017 Content Version: 11.7 © 3019-7648 [employer]. Care instructions adapted under license by [employer] (which disclaims liability or warranty for this information). If you have questions about a medical condition or this instruction, always ask your healthcare professional. [place] [address] any warranty or liability for your use of this information. Medicare Wellness Visit, Male The best way to live healthy is to have a lifestyle where you eat a well-balanced diet, exercise regularly, limit alcohol use, and quit all forms of tobacco/nicotine, if applicable. Regular preventive services are another way to keep healthy. Preventive services (vaccines, screening tests, monitoring & exams) can help personalize your care plan, which helps you manage your own care. Screening tests can find health problems at the earliest stages, when they are easiest to treat. [address] [facility] follows the current, evidence-based guidelines published by the [facility] Sergio Mendez (USPSTF) when recommending preventive services for our patients. Because we follow these guidelines, sometimes recommendations change over time as research supports it. (For example, a prostate screening blood test is no longer routinely recommended for men with no symptoms.) Of course, you and your provider may decide to screen more often for some diseases, based on your risk and co-morbidities (chronic disease you are already diagnosed with). Preventive services for you include: - Medicare offers their members a free annual wellness visit, which is time for you and your primary care provider to discuss and plan for your preventive service needs. Take advantage of this benefit every year! 
 
-All people over age 72 should receive the recommended pneumonia vaccines. Current USPSTF guidelines recommend a series of two vaccines for the best pneumonia protection.  
 
-All adults should have a yearly flu vaccine and a tetanus vaccine every 10 years. All adults age 61 years should receive a shingles vaccine once in their lifetime.   
 
-All adults age 38-68 years who are overweight should have a diabetes screening test once every three years.  
 
-Other screening tests & preventive services for persons with diabetes include: an eye exam to screen for diabetic retinopathy, a kidney function test, a foot exam, and stricter control over your cholesterol.  
 
-Cardiovascular screening for adults with routine risk involves an electrocardiogram (ECG) at intervals determined by the provider.  
 
-Colorectal cancer screenings should be done for adults age 54-65 years with normal risk. There are a number of acceptable methods of screening for this type of cancer. Each test has its own benefits and drawbacks. Discuss with your provider what is most appropriate for you during your annual wellness visit. The different tests include: colonoscopy (considered the best screening method), a fecal occult blood test, a fecal DNA test, and sigmoidoscopy. 
 
-All adults born between [facility] should be screened once for Hepatitis C. 
 
-An Abdominal Aortic Aneurysm (AAA) Screening is recommended for men age 73-68 who has ever smoked in their lifetime. Here is a list of your current Health Maintenance items (your personalized list of preventive services) with a due date: 
Health Maintenance Due Topic Date Due  Eye Exam  07/01/1950  
 DTaP/Tdap/Td  (1 - Tdap) 07/01/1960  Shingles Vaccine  05/01/1999  Glaucoma Screening   07/01/2004  Flu Vaccine  08/01/2018 Patient Instructions History Introducing [facility]! [facility] introduces Qello patient portal. Now you can access parts of your medical record, email your doctor's office, and request medication refills online. 1. In your internet browser, go to [email or web address] 2. Click on the First Time User? Click Here link in the Sign In box. You will see the New Member Sign Up page. 3. Enter your Qello Access Code exactly as it appears below. You will not need to use this code after youve completed the sign-up process. If you do not sign up before the expiration date, you must request a new code. · Qello Access Code: [email or web address] Expires: 9/23/2018  8:28 AM 
 
4. Enter the last four digits of your Social Security Number (xxxx) and Date of Birth (mm/dd/yyyy) as indicated and click Submit. You will be taken to the next sign-up page. 5. Create a Qello ID. This will be your Qello login ID and cannot be changed, so think of one that is secure and easy to remember. 6. Create a Qello password. You can change your password at any time. 7. Enter your Password Reset Question and Answer. This can be used at a later time if you forget your password. 8. Enter your e-mail address. You will receive e-mail notification when new information is available in [address]. 9. Click Sign Up. You can now view and download portions of your medical record. 10. Click the Download Summary menu link to download a portable copy of your medical information. If you have questions, please visit the Frequently Asked Questions section of the Qello website. Remember, Qello is NOT to be used for urgent needs. For medical emergencies, dial 911. Now available from your iPhone and Android! Please provide this summary of care documentation to your next provider. Your primary care clinician is listed as Henry. If you have any questions after today's visit, please call [phone].

## 2018-08-06 NOTE — ACP (ADVANCE CARE PLANNING)
Chief Complaint   Patient presents with    Annual Wellness Visit     yearly visit     1. Have you been to the ER, urgent care clinic since your last visit? No   Hospitalized since your last visit? No     2. Have you seen or consulted any other health care providers outside of the [address] [place] [address] since your last visit? No     Patient is aware he needs his annual eye exam.  Coming up in September 2018    ACP: patient has a living will.   Patient was asked to bring in for us to copy but is hesitant

## 2018-08-06 NOTE — PATIENT INSTRUCTIONS
Arthritis: Care Instructions  Your Care Instructions  Arthritis, also called osteoarthritis, is a breakdown of the cartilage that cushions your joints. When the cartilage wears down, your bones rub against each other. This causes pain and stiffness. Many people have some arthritis as they age. Arthritis most often affects the joints of the spine, hands, hips, knees, or feet. You can take simple measures to protect your joints, ease your pain, and help you stay active. Follow-up care is a key part of your treatment and safety. Be sure to make and go to all appointments, and call your doctor if you are having problems. It's also a good idea to know your test results and keep a list of the medicines you take. How can you care for yourself at home? · Stay at a healthy weight. Being overweight puts extra strain on your joints. · Talk to your doctor or physical therapist about exercises that will help ease joint pain. ¨ Stretch. You may enjoy gentle forms of yoga to help keep your joints and muscles flexible. ¨ Walk instead of jog. Other types of exercise that are less stressful on the joints include riding a bicycle, swimming, barbara chi, or water exercise. ¨ Lift weights. Strong muscles help reduce stress on your joints. Stronger thigh muscles, for example, take some of the stress off of the knees and hips. Learn the right way to lift weights so you do not make joint pain worse. · Take your medicines exactly as prescribed. Call your doctor if you think you are having a problem with your medicine. · Take pain medicines exactly as directed. ¨ If the doctor gave you a prescription medicine for pain, take it as prescribed. ¨ If you are not taking a prescription pain medicine, ask your doctor if you can take an over-the-counter medicine. · Use a cane, crutch, walker, or another device if you need help to get around. These can help rest your joints.  You also can use other things to make life easier, such as a higher toilet seat and padded handles on kitchen utensils. · Do not sit in low chairs, which can make it hard to get up. · Put heat or cold on your sore joints as needed. Use whichever helps you most. You also can take turns with hot and cold packs. ¨ Apply heat 2 or 3 times a day for 20 to 30 minutes-using a heating pad, hot shower, or hot pack-to relieve pain and stiffness. ¨ Put ice or a cold pack on your sore joint for 10 to 20 minutes at a time. Put a thin cloth between the ice and your skin. When should you call for help? Call your doctor now or seek immediate medical care if:    · You have sudden swelling, warmth, or pain in any joint.     · You have joint pain and a fever or rash.     · You have such bad pain that you cannot use a joint.    Watch closely for changes in your health, and be sure to contact your doctor if:    · You have mild joint symptoms that continue even with more than 6 weeks of care at home.     · You have stomach pain or other problems with your medicine. Where can you learn more? Go to http://estrellaStartup Threadsdavid.info/. Enter M525 in the search box to learn more about \"Arthritis: Care Instructions. \"  Current as of: October 10, 2017  Content Version: 11.7  © 2559-5841 [employer]. Care instructions adapted under license by [employer] (which disclaims liability or warranty for this information). If you have questions about a medical condition or this instruction, always ask your healthcare professional. [place] [address] any warranty or liability for your use of this information. Medicare Wellness Visit, Male    The best way to live healthy is to have a lifestyle where you eat a well-balanced diet, exercise regularly, limit alcohol use, and quit all forms of tobacco/nicotine, if applicable. Regular preventive services are another way to keep healthy.  Preventive services (vaccines, screening tests, monitoring & exams) can help personalize your care plan, which helps you manage your own care. Screening tests can find health problems at the earliest stages, when they are easiest to treat. [address] [facility] follows the current, evidence-based guidelines published by the [facility] Sergio Simms ([facility]) when recommending preventive services for our patients. Because we follow these guidelines, sometimes recommendations change over time as research supports it. (For example, a prostate screening blood test is no longer routinely recommended for men with no symptoms.)    Of course, you and your provider may decide to screen more often for some diseases, based on your risk and co-morbidities (chronic disease you are already diagnosed with). Preventive services for you include:    - Medicare offers their members a free annual wellness visit, which is time for you and your primary care provider to discuss and plan for your preventive service needs. Take advantage of this benefit every year!    -All people over age 72 should receive the recommended pneumonia vaccines. Current USPSTF guidelines recommend a series of two vaccines for the best pneumonia protection.     -All adults should have a yearly flu vaccine and a tetanus vaccine every 10 years. All adults age 61 years should receive a shingles vaccine once in their lifetime.      -All adults age 38-68 years who are overweight should have a diabetes screening test once every three years.     -Other screening tests & preventive services for persons with diabetes include: an eye exam to screen for diabetic retinopathy, a kidney function test, a foot exam, and stricter control over your cholesterol.     -Cardiovascular screening for adults with routine risk involves an electrocardiogram (ECG) at intervals determined by the provider.     -Colorectal cancer screenings should be done for adults age 54-65 years with normal risk.  There are a number of acceptable methods of screening for this type of cancer. Each test has its own benefits and drawbacks. Discuss with your provider what is most appropriate for you during your annual wellness visit. The different tests include: colonoscopy (considered the best screening method), a fecal occult blood test, a fecal DNA test, and sigmoidoscopy.    -All adults born between [facility] should be screened once for Hepatitis C.    -An Abdominal Aortic Aneurysm (AAA) Screening is recommended for men age 73-68 who has ever smoked in their lifetime.      Here is a list of your current Health Maintenance items (your personalized list of preventive services) with a due date:  Health Maintenance Due   Topic Date Due    Eye Exam  07/01/1950    DTaP/Tdap/Td  (1 - Tdap) 07/01/1960    Shingles Vaccine  05/01/1999    Glaucoma Screening   07/01/2004    Flu Vaccine  08/01/2018

## 2018-08-07 LAB
ALBUMIN SERPL-MCNC: 4.4 G/DL (ref 3.5–4.8)
ALBUMIN/GLOB SERPL: 1.5 {RATIO} (ref 1.2–2.2)
ALP SERPL-CCNC: 75 IU/L (ref 39–117)
ALT SERPL-CCNC: 31 IU/L (ref 0–44)
AST SERPL-CCNC: 31 IU/L (ref 0–40)
BILIRUB SERPL-MCNC: 0.8 MG/DL (ref 0–1.2)
BUN SERPL-MCNC: 16 MG/DL (ref 8–27)
BUN/CREAT SERPL: 21 (ref 10–24)
CALCIUM SERPL-MCNC: 10.2 MG/DL (ref 8.6–10.2)
CHLORIDE SERPL-SCNC: 102 MMOL/L (ref 96–106)
CO2 SERPL-SCNC: 22 MMOL/L (ref 20–29)
CREAT SERPL-MCNC: 0.77 MG/DL (ref 0.76–1.27)
GLOBULIN SER CALC-MCNC: 3 G/DL (ref 1.5–4.5)
GLUCOSE SERPL-MCNC: 84 MG/DL (ref 65–99)
POTASSIUM SERPL-SCNC: 5 MMOL/L (ref 3.5–5.2)
PROT SERPL-MCNC: 7.4 G/DL (ref 6–8.5)
SODIUM SERPL-SCNC: 141 MMOL/L (ref 134–144)
T4 FREE SERPL-MCNC: 1.35 NG/DL (ref 0.82–1.77)
TSH SERPL DL<=0.005 MIU/L-ACNC: 1.68 UIU/ML (ref 0.45–4.5)

## 2018-09-05 RX ORDER — POLYETHYLENE GLYCOL 3350 17 G/17G
POWDER, FOR SOLUTION ORAL
Qty: 510 G | Refills: 3 | Status: SHIPPED | OUTPATIENT
Start: 2018-09-05 | End: 2019-10-29 | Stop reason: SDUPTHER

## 2018-10-11 RX ORDER — OMEPRAZOLE 20 MG/1
CAPSULE, DELAYED RELEASE ORAL
Qty: 90 CAP | Refills: 3 | Status: SHIPPED | OUTPATIENT
Start: 2018-10-11 | End: 2019-01-28 | Stop reason: SDUPTHER

## 2018-10-11 NOTE — TELEPHONE ENCOUNTER
Requested Prescriptions     Pending Prescriptions Disp Refills    omeprazole (PRILOSEC) 20 mg capsule [Pharmacy Med Name: OMEPRAZOLE 20MG CAP] 90 Cap 3     Sig: TAKE ONE CAPSULE BY MOUTH ONCE DAILY     Patient Last Seen:  08- with labs    Last labs done: NA    Next appointment:  11-

## 2018-11-16 NOTE — PROGRESS NOTES
Chief Complaint Patient presents with  Diabetes 3 month follow up  Anxiety SUBJECTIVE: 
 
Hayder Traore is a 77 y.o. male who returns in follow-up of his medical problems to include hypertension, diabetes, hyperlipidemia, ASCVD, cardiomyopathy, DJD, and other medical problems. He is taking his medications and trying to follow his diet and trying to get some exercise regular. He currently denies any chest pain, shortness of breath, palpitations, PND, orthopnea or cardiorespiratory complaints. He denies any GI or  complaints. He denies any headaches, dizziness or neurological planes. There are no current arthritic complaints and no other complaints on complete review of systems. Current Outpatient Medications Medication Sig Dispense Refill  omeprazole (PRILOSEC) 20 mg capsule TAKE ONE CAPSULE BY MOUTH ONCE DAILY 90 Cap 3  polyethylene glycol (MIRALAX) 17 gram/dose powder MIX AND TAKE 17 GRAMS BY MOUTH DAILY 510 g 3  
 metoprolol succinate (TOPROL-XL) 25 mg XL tablet TAKE ONE TABLET BY MOUTH ONCE DAILY 90 Tab 3  
 ramipril (ALTACE) 10 mg capsule TAKE TWO CAPSULES BY MOUTH ONCE DAILY 180 Cap 3  Blood-Glucose Meter (TRUE METRIX GLUCOSE METER) misc Use daily as needed for diabetes management. Diagnosis E11.9 1 Each 0  
 glucose blood VI test strips (TRUE METRIX GLUCOSE TEST STRIP) strip Use 1 to 2 times daily as needed for Diabetes management. Diagnosis is 411.9. 50 Strip 5  
 glucose blood VI test strips (TRUETEST TEST STRIPS) strip by Does Not Apply route See Admin Instructions. 100 Strip prn  UBIDECARENONE (CO Q-10 PO) Take  by mouth.  OTHER Take 7 Tabs by mouth daily. [address]  aspirin 81 mg tablet Take 81 mg by mouth daily. Indications: taking one a day Past Medical History:  
Diagnosis Date  Anxiety 8/5/2017  Arrhythmia  Arthritis  Atherosclerotic heart disease of native coronary artery without angina pectoris 8/5/2017  Bradycardia 8/5/2017  CAD (coronary artery disease)  Constipation, chronic 8/5/2017  Diabetes ([facility] [address] diet controlled  Diabetes mellitus ([facility] [address] 8/5/2017  Diverticulosis 8/5/2017  ED (erectile dysfunction) 8/5/2017  GERD (gastroesophageal reflux disease)  Hypertension  Hypogonadism male 8/5/2017  Kidney stone  Neuropathy 8/5/2017  On statin therapy 8/5/2017  Right foot pain 8/5/2017 Past Surgical History:  
Procedure Laterality Date  CARDIAC SURG PROCEDURE UNLIST    
 cabg x4 vessels 1999  COLORECTAL SCRN; HI RISK IND  6/21/2016  HX GI    
 COLONOSCOPY  
 HX HEART CATHETERIZATION    
 HX OTHER SURGICAL    
 artificial testicle  HX PACEMAKER  10/6/15  NE COLONOSCOPY FLX DX W/COLLJ SPEC WHEN PFRMD  4/8/2011  UPPER GI ENDOSCOPY,BIOPSY  12/14/2016 Allergies Allergen Reactions  Caffeine Unknown (comments) Sweating AND WOOZY AND CAN'T SLEEP  
 Codeine Other (comments) Lightheaded, WOOZY AND CAN'T SLEEP  
 Percocet [Oxycodone-Acetaminophen] Nausea and Vomiting REVIEW OF SYSTEMS: 
General: negative for - chills or fever, or weight loss or gain ENT: negative for - headaches, nasal congestion or tinnitus Eyes: no blurred or visual changes Neck: No stiffness or swollen nodes Respiratory: negative for - cough, hemoptysis, shortness of breath or wheezing Cardiovascular : negative for - chest pain, edema, palpitations or shortness of breath Gastrointestinal: negative for - abdominal pain, blood in stools, heartburn or nausea/vomiting Genito-Urinary: no dysuria, trouble voiding, or hematuria Musculoskeletal: negative for - gait disturbance, joint pain, joint stiffness or joint swelling Neurological: no TIA or stroke symptoms Hematologic: no bruises, no bleeding Lymphatic: no swollen glands Integument: no lumps, mole changes, nail changes or rash Endocrine:no malaise/lethargy poly uria or polydipsia or unexpected weight changes Social History Socioeconomic History  Marital status:  Spouse name: Not on file  Number of children: Not on file  Years of education: Not on file  Highest education level: Not on file Social Needs  Financial resource strain: Not on file  Food insecurity - worry: Not on file  Food insecurity - inability: Not on file  Transportation needs - medical: Not on file  Transportation needs - non-medical: Not on file Occupational History  Not on file Tobacco Use  Smoking status: Never Smoker  Smokeless tobacco: Never Used Substance and Sexual Activity  Alcohol use: No  
 Drug use: No  
 Sexual activity: Not on file Other Topics Concern  Not on file Social History Narrative  Not on file Family History Problem Relation Age of Onset  Heart Disease Mother  No Known Problems Father  Cancer Sister  Diabetes Brother  Anesth Problems Neg Hx OBJECTIVE:  
 
Visit Vitals /82 (BP 1 Location: Left arm, BP Patient Position: Sitting) Pulse 60 Resp 16 Ht 4' 11\" (1.499 m) Wt 119 lb 9.6 oz (54.3 kg) SpO2 98% BMI 24.16 kg/m² CONSTITUTIONAL:   well nourished, appears age appropriate EYES: sclera anicteric, PERRL, EOMI 
ENMT:nares clear, moist mucous membranes, pharynx clear NECK: supple. Thyroid normal, No JVD or bruits RESPIRATORY: Chest: clear to ascultation and percussion, normal inspiratory effort CARDIOVASCULAR: Heart: regular rate and rhythm no murmurs, rubs or gallops, PMI not displaced, No thrills GASTROINTESTINAL: Abdomen: non distended, soft, non tender, bowel sounds normal 
HEMATOLOGIC: no purpura, petechiae or bruising LYMPHATIC: No lymph node enlargemant MUSCULOSKELETAL: Extremities: no edema or active synovitis, pulse 1+ INTEGUMENT: No unusual rashes or suspicious skin lesions noted.  Nails appear normal. 
 PERIPHERAL VASCULAR: normal pulses femoral, PT and DP NEUROLOGIC: non-focal exam, A & O X 3 PSYCHIATRIC:, appropriate affect ASSESSMENT:  
1. Essential hypertension 2. Controlled type 2 diabetes mellitus with diabetic polyneuropathy, without long-term current use of insulin ([facility] [address] 3. Mixed hyperlipidemia 4. Cardiomyopathy, unspecified type ([facility] [address] 5. ASCVD (arteriosclerotic cardiovascular disease) 6. Primary osteoarthritis involving multiple joints 7. Anxiety 8. Encounter for immunization Impression 1. Hypertension that is controlled she will continue current treatment with Altace 2. Diabetes repeat times has been prior labs reviewed no med adjustments if necessary. 3 hyperlipidemia probably have reviewed repeat labs pending and I will adjust if needed. 4.  Cardiomyopathy that is currently stable and clinically has no evidence or signs of congestive heart failure 5. ASCVD clinically stable 6. DJD that is stable next #7 anxiety that is stable Flu shot given today. I will call the lab results and make further recommendations or adjustments necessary. Follow-up as scheduled again for 3 months or sooner should there be a problem. PLAN: 
. Orders Placed This Encounter  Influenza Vaccine Inactivated (IIV)(FLUAD), Subunit, Adjuvanted, IM, (87987)  METABOLIC PANEL, COMPREHENSIVE  LIPID PANEL  
 CK  
 HEMOGLOBIN A1C WITH EAG  
 
 
 
ATTENTION:  
This medical record was transcribed using an electronic medical records system. Although proofread, it may and can contain electronic and spelling errors. Other human spelling and other errors may be present. Corrections may be executed at a later time. Please feel free to contact us for any clarifications as needed. Follow-up Disposition: 
Return in about 3 months (around 2/19/2019). No results found for any visits on 11/19/18.  
 
Pam Abbott MD 
 
 The patient verbalized understanding of the problems and plans as explained.

## 2018-11-19 ENCOUNTER — OFFICE VISIT (OUTPATIENT)
Dept: INTERNAL MEDICINE CLINIC | Age: 78
End: 2018-11-19

## 2018-11-19 VITALS
OXYGEN SATURATION: 98 % | RESPIRATION RATE: 16 BRPM | WEIGHT: 119.6 LBS | DIASTOLIC BLOOD PRESSURE: 82 MMHG | HEART RATE: 60 BPM | HEIGHT: 60 IN | SYSTOLIC BLOOD PRESSURE: 120 MMHG | BODY MASS INDEX: 23.48 KG/M2

## 2018-11-19 DIAGNOSIS — M15.9 PRIMARY OSTEOARTHRITIS INVOLVING MULTIPLE JOINTS: ICD-10-CM

## 2018-11-19 DIAGNOSIS — I42.9 CARDIOMYOPATHY, UNSPECIFIED TYPE (HCC): ICD-10-CM

## 2018-11-19 DIAGNOSIS — I10 ESSENTIAL HYPERTENSION: Primary | ICD-10-CM

## 2018-11-19 DIAGNOSIS — I25.10 ASCVD (ARTERIOSCLEROTIC CARDIOVASCULAR DISEASE): ICD-10-CM

## 2018-11-19 DIAGNOSIS — F41.9 ANXIETY: ICD-10-CM

## 2018-11-19 DIAGNOSIS — E78.2 MIXED HYPERLIPIDEMIA: ICD-10-CM

## 2018-11-19 DIAGNOSIS — E11.42 CONTROLLED TYPE 2 DIABETES MELLITUS WITH DIABETIC POLYNEUROPATHY, WITHOUT LONG-TERM CURRENT USE OF INSULIN (HCC): ICD-10-CM

## 2018-11-19 DIAGNOSIS — Z23 ENCOUNTER FOR IMMUNIZATION: ICD-10-CM

## 2018-11-19 NOTE — PATIENT INSTRUCTIONS
Arthritis: Care Instructions Your Care Instructions Arthritis, also called osteoarthritis, is a breakdown of the cartilage that cushions your joints. When the cartilage wears down, your bones rub against each other. This causes pain and stiffness. Many people have some arthritis as they age. Arthritis most often affects the joints of the spine, hands, hips, knees, or feet. You can take simple measures to protect your joints, ease your pain, and help you stay active. Follow-up care is a key part of your treatment and safety. Be sure to make and go to all appointments, and call your doctor if you are having problems. It's also a good idea to know your test results and keep a list of the medicines you take. How can you care for yourself at home? · Stay at a healthy weight. Being overweight puts extra strain on your joints. · Talk to your doctor or physical therapist about exercises that will help ease joint pain. ? Stretch. You may enjoy gentle forms of yoga to help keep your joints and muscles flexible. ? Walk instead of jog. Other types of exercise that are less stressful on the joints include riding a bicycle, swimming, barbara chi, or water exercise. ? Lift weights. Strong muscles help reduce stress on your joints. Stronger thigh muscles, for example, take some of the stress off of the knees and hips. Learn the right way to lift weights so you do not make joint pain worse. · Take your medicines exactly as prescribed. Call your doctor if you think you are having a problem with your medicine. · Take pain medicines exactly as directed. ? If the doctor gave you a prescription medicine for pain, take it as prescribed. ? If you are not taking a prescription pain medicine, ask your doctor if you can take an over-the-counter medicine. · Use a cane, crutch, walker, or another device if you need help to get around. These can help rest your joints.  You also can use other things to make life easier, such as a higher toilet seat and padded handles on kitchen utensils. · Do not sit in low chairs, which can make it hard to get up. · Put heat or cold on your sore joints as needed. Use whichever helps you most. You also can take turns with hot and cold packs. ? Apply heat 2 or 3 times a day for 20 to 30 minutesusing a heating pad, hot shower, or hot packto relieve pain and stiffness. ? Put ice or a cold pack on your sore joint for 10 to 20 minutes at a time. Put a thin cloth between the ice and your skin. When should you call for help? Call your doctor now or seek immediate medical care if: 
  · You have sudden swelling, warmth, or pain in any joint.  
  · You have joint pain and a fever or rash.  
  · You have such bad pain that you cannot use a joint.  
 Watch closely for changes in your health, and be sure to contact your doctor if: 
  · You have mild joint symptoms that continue even with more than 6 weeks of care at home.  
  · You have stomach pain or other problems with your medicine. Where can you learn more? Go to http://estrella-david.info/. Enter H017 in the search box to learn more about \"Arthritis: Care Instructions. \" Current as of: June 11, 2018 Content Version: 11.8 © 3644-5388 [employer]. Care instructions adapted under license by [employer] (which disclaims liability or warranty for this information). If you have questions about a medical condition or this instruction, always ask your healthcare professional. [place] [address] any warranty or liability for your use of this information. Influenza (Flu) Vaccine (Inactivated or Recombinant): What You Need to Know Why get vaccinated? Influenza (\"flu\") is a contagious disease that spreads around the [place] every winter, usually between October and May.  
Flu is caused by influenza viruses and is spread mainly by coughing, sneezing, and close contact. Anyone can get flu. Flu strikes suddenly and can last several days. Symptoms vary by age, but can include: · Fever/chills. · Sore throat. · Muscle aches. · Fatigue. · Cough. · Headache. · Runny or stuffy nose. Flu can also lead to pneumonia and blood infections, and cause diarrhea and seizures in children. If you have a medical condition, such as heart or lung disease, flu can make it worse. Flu is more dangerous for some people. Infants and young children, people 72years of age and older, pregnant women, and people with certain health conditions or a weakened immune system are at greatest risk. Each year thousands of people in the [facility] die from flu, and many more are hospitalized. Flu vaccine can: · Keep you from getting flu. · Make flu less severe if you do get it. · Keep you from spreading flu to your family and other people. Inactivated and recombinant flu vaccines A dose of flu vaccine is recommended every flu season. Children 6 months through 6years of age may need two doses during the same flu season. Everyone else needs only one dose each flu season. Some inactivated flu vaccines contain a very small amount of a mercury-based preservative called thimerosal. Studies have not shown thimerosal in vaccines to be harmful, but flu vaccines that do not contain thimerosal are available. There is no live flu virus in flu shots. They cannot cause the flu. There are many flu viruses, and they are always changing. Each year a new flu vaccine is made to protect against three or four viruses that are likely to cause disease in the upcoming flu season. But even when the vaccine doesn't exactly match these viruses, it may still provide some protection. Flu vaccine cannot prevent: · Flu that is caused by a virus not covered by the vaccine. · Illnesses that look like flu but are not. Some people should not get this vaccine Tell the person who is giving you the vaccine: · If you have any severe (life-threatening) allergies. If you ever had a life-threatening allergic reaction after a dose of flu vaccine, or have a severe allergy to any part of this vaccine, you may be advised not to get vaccinated. Most, but not all, types of flu vaccine contain a small amount of egg protein. · If you ever had Guillain-Barré syndrome (also called GBS) Some people with a history of GBS should not get this vaccine. This should be discussed with your doctor. · If you are not feeling well. It is usually okay to get flu vaccine when you have a mild illness, but you might be asked to come back when you feel better. Risks of a vaccine reaction With any medicine, including vaccines, there is a chance of reactions. These are usually mild and go away on their own, but serious reactions are also possible. Most people who get a flu shot do not have any problems with it. Minor problems following a flu shot include: · Soreness, redness, or swelling where the shot was given · Hoarseness · Sore, red or itchy eyes · Cough · Fever · Aches · Headache · Itching · Fatigue If these problems occur, they usually begin soon after the shot and last 1 or 2 days. More serious problems following a flu shot can include the following: · There may be a small increased risk of Guillain-Barré Syndrome (GBS) after inactivated flu vaccine. This risk has been estimated at 1 or 2 additional cases per million people vaccinated. This is much lower than the risk of severe complications from flu, which can be prevented by flu vaccine. · Luisa Jucharley children who get the flu shot along with pneumococcal vaccine (PCV13) and/or DTaP vaccine at the same time might be slightly more likely to have a seizure caused by fever. Ask your doctor for more information. Tell your doctor if a child who is getting flu vaccine has ever had a seizure Problems that could happen after any injected vaccine: · People sometimes faint after a medical procedure, including vaccination. Sitting or lying down for about 15 minutes can help prevent fainting, and injuries caused by a fall. Tell your doctor if you feel dizzy, or have vision changes or ringing in the ears. · Some people get severe pain in the shoulder and have difficulty moving the arm where a shot was given. This happens very rarely. · Any medication can cause a severe allergic reaction. Such reactions from a vaccine are very rare, estimated at about 1 in a million doses, and would happen within a few minutes to a few hours after the vaccination. As with any medicine, there is a very remote chance of a vaccine causing a serious injury or death. The safety of vaccines is always being monitored. For more information, visit: www.cdc.gov/vaccinesafety/. What if there is a serious reaction? What should I look for? · Look for anything that concerns you, such as signs of a severe allergic reaction, very high fever, or unusual behavior. Signs of a severe allergic reaction can include hives, swelling of the face and throat, difficulty breathing, a fast heartbeat, dizziness, and weakness  usually within a few minutes to a few hours after the vaccination. What should I do? · If you think it is a severe allergic reaction or other emergency that can't wait, call 9-1-1 and get the person to the nearest hospital. Otherwise, call your doctor. · Reactions should be reported to the \"Vaccine Adverse Event Reporting System\" (VAERS). Your doctor should file this report, or you can do it yourself through the VAERS website at www.vaers. hhs.gov, or by calling 5-838.927.1727. VAERS does not give medical advice.  
The [facility] Vaccine Injury Compensation Program 
The National Vaccine Injury Compensation Program (VICP) is a federal program that was created to compensate people who may have been injured by certain vaccines. Persons who believe they may have been injured by a vaccine can learn about the program and about filing a claim by calling 6-768.834.8821 or visiting the [employer] website at www.[facility].gov/vaccinecompensation. There is a time limit to file a claim for compensation. How can I learn more? · Ask your healthcare provider. He or she can give you the vaccine package insert or suggest other sources of information. · Call your local or state health department. · Contact the Centers for Disease Control and Prevention (CDC): 
? Call 2-787.943.3631 (1-800-CDC-INFO) or 
? Visit CDC's website at www.cdc.gov/flu Vaccine Information Statement Inactivated Influenza Vaccine 8/7/2015) [address] [id number] [facility] of [facility] and [place] Centers for Disease Control and Prevention Many Vaccine Information Statements are available in Sinhala and other languages. See www.immunize.org/vis. Muchas hojas de información sobre vacunas están disponibles en español y en otros idiomas. Visite www.immunize.org/vis. Care instructions adapted under license by [employer] (which disclaims liability or warranty for this information). If you have questions about a medical condition or this instruction, always ask your healthcare professional. [address] any warranty or liability for your use of this information.

## 2018-11-19 NOTE — PROGRESS NOTES
Chief Complaint Patient presents with  Diabetes 3 month follow up  Anxiety 1. Have you been to the ER, urgent care clinic since your last visit? Hospitalized since your last visit? No 
 
2. Have you seen or consulted any other health care providers outside of the [address] [place] [address] since your last visit? Include any pap smears or colon screening. No 
Visit Vitals /82 (BP 1 Location: Left arm, BP Patient Position: Sitting) Pulse 60 Resp 16 Ht 4' 11\" (1.499 m) Wt 119 lb 9.6 oz (54.3 kg) SpO2 98% BMI 24.16 kg/m²

## 2018-11-20 LAB
ALBUMIN SERPL-MCNC: 4 G/DL (ref 3.5–4.8)
ALBUMIN/GLOB SERPL: 1.4 {RATIO} (ref 1.2–2.2)
ALP SERPL-CCNC: 68 IU/L (ref 39–117)
ALT SERPL-CCNC: 22 IU/L (ref 0–44)
AST SERPL-CCNC: 28 IU/L (ref 0–40)
BILIRUB SERPL-MCNC: 0.6 MG/DL (ref 0–1.2)
BUN SERPL-MCNC: 16 MG/DL (ref 8–27)
BUN/CREAT SERPL: 20 (ref 10–24)
CALCIUM SERPL-MCNC: 9.7 MG/DL (ref 8.6–10.2)
CHLORIDE SERPL-SCNC: 102 MMOL/L (ref 96–106)
CHOLEST SERPL-MCNC: 199 MG/DL (ref 100–199)
CK SERPL-CCNC: 151 U/L (ref 24–204)
CO2 SERPL-SCNC: 25 MMOL/L (ref 20–29)
CREAT SERPL-MCNC: 0.8 MG/DL (ref 0.76–1.27)
EST. AVERAGE GLUCOSE BLD GHB EST-MCNC: 137 MG/DL
GLOBULIN SER CALC-MCNC: 2.9 G/DL (ref 1.5–4.5)
GLUCOSE SERPL-MCNC: 81 MG/DL (ref 65–99)
HBA1C MFR BLD: 6.4 % (ref 4.8–5.6)
HDLC SERPL-MCNC: 56 MG/DL
LDLC SERPL CALC-MCNC: 131 MG/DL (ref 0–99)
POTASSIUM SERPL-SCNC: 4.7 MMOL/L (ref 3.5–5.2)
PROT SERPL-MCNC: 6.9 G/DL (ref 6–8.5)
SODIUM SERPL-SCNC: 140 MMOL/L (ref 134–144)
TRIGL SERPL-MCNC: 58 MG/DL (ref 0–149)
VLDLC SERPL CALC-MCNC: 12 MG/DL (ref 5–40)

## 2019-01-28 DIAGNOSIS — I10 ESSENTIAL HYPERTENSION: ICD-10-CM

## 2019-01-28 RX ORDER — OMEPRAZOLE 20 MG/1
CAPSULE, DELAYED RELEASE ORAL
Qty: 90 CAP | Refills: 3 | Status: SHIPPED | OUTPATIENT
Start: 2019-01-28 | End: 2019-10-28 | Stop reason: SDUPTHER

## 2019-01-28 RX ORDER — RAMIPRIL 10 MG/1
CAPSULE ORAL
Qty: 180 CAP | Refills: 3 | Status: SHIPPED | OUTPATIENT
Start: 2019-01-28 | End: 2020-02-03

## 2019-01-28 NOTE — TELEPHONE ENCOUNTER
RX refill request from the patient/pharmacy. Patient last seen 01- with labs, and next appt. scheduled for 05-  Requested Prescriptions     Pending Prescriptions Disp Refills    omeprazole (PRILOSEC) 20 mg capsule 90 Cap 3     Sig: TAKE ONE CAPSULE BY MOUTH ONCE DAILY   .

## 2019-01-28 NOTE — TELEPHONE ENCOUNTER
RX refill request from the patient/pharmacy. Patient last seen 11- with labs, and next appt. scheduled for 02-  Requested Prescriptions     Pending Prescriptions Disp Refills    ramipril (ALTACE) 10 mg capsule 180 Cap 3     Sig: TAKE TWO CAPSULES BY MOUTH ONCE DAILY   .

## 2019-02-22 NOTE — PROGRESS NOTES
Chief Complaint Patient presents with  Hypertension 3 month follow up  Diabetes  Anxiety SUBJECTIVE: 
 
Camille Rehman is a 77 y.o. male who returns in follow-up of his medical problems include hypertension, diabetes, hyperlipidemia, ASCVD, cardiomyopathy, DJD, anxiety and other medical problems. He is taking his medications and trying to follow his diet and remains physically active. He currently denies any chest pain, shortness of breath, palpitations, PND, orthopnea or other cardiorespiratory complaints. He denies any GI or  complaints. He denies any headaches, dizziness or neurologic complaint. There are no current arthritic complaints and he has no other complaints on complete review of systems. Current Outpatient Medications Medication Sig Dispense Refill  OTHER     
 omeprazole (PRILOSEC) 20 mg capsule TAKE ONE CAPSULE BY MOUTH ONCE DAILY 90 Cap 3  
 ramipril (ALTACE) 10 mg capsule TAKE TWO CAPSULES BY MOUTH ONCE DAILY 180 Cap 3  polyethylene glycol (MIRALAX) 17 gram/dose powder MIX AND TAKE 17 GRAMS BY MOUTH DAILY 510 g 3  
 metoprolol succinate (TOPROL-XL) 25 mg XL tablet TAKE ONE TABLET BY MOUTH ONCE DAILY 90 Tab 3  Blood-Glucose Meter (TRUE METRIX GLUCOSE METER) misc Use daily as needed for diabetes management. Diagnosis E11.9 1 Each 0  
 glucose blood VI test strips (TRUE METRIX GLUCOSE TEST STRIP) strip Use 1 to 2 times daily as needed for Diabetes management. Diagnosis is 411.9. 50 Strip 5  
 glucose blood VI test strips (TRUETEST TEST STRIPS) strip by Does Not Apply route See Admin Instructions. 100 Strip prn  UBIDECARENONE (CO Q-10 PO) Take  by mouth.  OTHER Take 7 Tabs by mouth daily. [address]  aspirin 81 mg tablet Take 81 mg by mouth daily. Indications: taking one a day Past Medical History:  
Diagnosis Date  Anxiety 8/5/2017  Arrhythmia  Arthritis  Atherosclerotic heart disease of native coronary artery without angina pectoris 8/5/2017  Bradycardia 8/5/2017  CAD (coronary artery disease)  Constipation, chronic 8/5/2017  Diabetes ([facility] [address] diet controlled  Diabetes mellitus ([facility] [address] [facility] 8/5/2017  Diverticulosis 8/5/2017  ED (erectile dysfunction) 8/5/2017  GERD (gastroesophageal reflux disease)  Hypertension  Hypogonadism male 8/5/2017  Kidney stone  Neuropathy 8/5/2017  On statin therapy 8/5/2017  Right foot pain 8/5/2017 Past Surgical History:  
Procedure Laterality Date  CARDIAC SURG PROCEDURE UNLIST    
 cabg x4 vessels 1999  COLONOSCOPY N/A 6/21/2016 COLONOSCOPY performed by Luke Barahona MD at [facility] ENDOSCOPY  COLORECTAL SCRN; HI RISK IND  6/21/2016  HX GI    
 COLONOSCOPY  
 HX HEART CATHETERIZATION    
 HX OTHER SURGICAL    
 artificial testicle  HX PACEMAKER  10/6/15  [place] COLONOSCOPY FLX DX W/COLLJ SPEC WHEN PFRMD  4/8/2011  UPPER GI ENDOSCOPY,BIOPSY  12/14/2016 Allergies Allergen Reactions  Caffeine Unknown (comments) Sweating AND WOOZY AND CAN'T SLEEP  
 Codeine Other (comments) Lightheaded, WOOZY AND CAN'T SLEEP  
 Percocet [Oxycodone-Acetaminophen] Nausea and Vomiting REVIEW OF SYSTEMS: 
General: negative for - chills or fever, or weight loss or gain ENT: negative for - headaches, nasal congestion or tinnitus Eyes: no blurred or visual changes Neck: No stiffness or swollen nodes Respiratory: negative for - cough, hemoptysis, shortness of breath or wheezing Cardiovascular : negative for - chest pain, edema, palpitations or shortness of breath Gastrointestinal: negative for - abdominal pain, blood in stools, heartburn or nausea/vomiting Genito-Urinary: no dysuria, trouble voiding, or hematuria Musculoskeletal: negative for - gait disturbance, joint pain, joint stiffness or joint swelling Neurological: no TIA or stroke symptoms Hematologic: no bruises, no bleeding Lymphatic: no swollen glands Integument: no lumps, mole changes, nail changes or rash Endocrine:no malaise/lethargy poly uria or polydipsia or unexpected weight changes Social History Socioeconomic History  Marital status:  Spouse name: Not on file  Number of children: Not on file  Years of education: Not on file  Highest education level: Not on file Tobacco Use  Smoking status: Never Smoker  Smokeless tobacco: Never Used Substance and Sexual Activity  Alcohol use: No  
 Drug use: No  
 
Family History Problem Relation Age of Onset  Heart Disease Mother  No Known Problems Father  Cancer Sister  Diabetes Brother  Anesth Problems Neg Hx OBJECTIVE:  
 
Visit Vitals /88 (BP 1 Location: Left arm, BP Patient Position: Sitting) Pulse 94 Temp 97.7 °F (36.5 °C) (Oral) Resp 15 Ht 4' 11\" (1.499 m) Wt 119 lb 9.6 oz (54.3 kg) SpO2 98% BMI 24.16 kg/m² CONSTITUTIONAL:   well nourished, appears age appropriate EYES: sclera anicteric, PERRL, EOMI 
ENMT:nares clear, moist mucous membranes, pharynx clear NECK: supple. Thyroid normal, No JVD or bruits RESPIRATORY: Chest: clear to ascultation and percussion, normal inspiratory effort CARDIOVASCULAR: Heart: regular rate and rhythm no murmurs, rubs or gallops, PMI not displaced, No thrills GASTROINTESTINAL: Abdomen: non distended, soft, non tender, bowel sounds normal 
HEMATOLOGIC: no purpura, petechiae or bruising LYMPHATIC: No lymph node enlargemant MUSCULOSKELETAL: Extremities: no edema or active synovitis, pulse 1+ INTEGUMENT: No unusual rashes or suspicious skin lesions noted. Nails appear normal.  No diabetic foot changes noted. PERIPHERAL VASCULAR: normal pulses femoral, PT and DP NEUROLOGIC: non-focal exam, A & O X 3. Normal distal sensation and proprioception all toes both feet. PSYCHIATRIC:, appropriate affect ASSESSMENT:  
1. Essential hypertension 2. Controlled type 2 diabetes mellitus with diabetic polyneuropathy, without long-term current use of insulin ([facility] [address] 3. Mixed hyperlipidemia 4. Cardiomyopathy, unspecified type ([facility] [address] 5. ASCVD (arteriosclerotic cardiovascular disease) 6. Primary osteoarthritis involving multiple joints Impression 1. Hypertension that is controlled to continue current therapy reviewed with him. 2.  Diabetes repeat status is pending and prior labs reviewed no make adjustments if necessary. 3.  Hyperlipidemia prior lab reviewed and repeat status pending I will adjust if needed. 4.  Cardiomyopathy that is currently stable 5. ASCVD clinically stable continue aspirin daily 6. DJD that is stable I will call the lab results and make further recommendations or adjustments if necessary. Follow-up as scheduled for 3 months or sooner should there be a problem. PLAN: 
. Orders Placed This Encounter  HEMOGLOBIN A1C WITH EAG  
 METABOLIC PANEL, COMPREHENSIVE ([facility])  LIPID PANEL ([facility])  CK ([facility])  OTHER  
 
 
 
ATTENTION:  
This medical record was transcribed using an electronic medical records system. Although proofread, it may and can contain electronic and spelling errors. Other human spelling and other errors may be present. Corrections may be executed at a later time. Please feel free to contact us for any clarifications as needed. Follow-up Disposition: 
Return in about 3 months (around 5/25/2019). No results found for any visits on 02/25/19. Janet Kaye MD 
 
The patient verbalized understanding of the problems and plans as explained.

## 2019-02-25 ENCOUNTER — OFFICE VISIT (OUTPATIENT)
Dept: INTERNAL MEDICINE CLINIC | Age: 79
End: 2019-02-25

## 2019-02-25 VITALS
TEMPERATURE: 97.7 F | OXYGEN SATURATION: 98 % | HEART RATE: 94 BPM | RESPIRATION RATE: 15 BRPM | SYSTOLIC BLOOD PRESSURE: 138 MMHG | DIASTOLIC BLOOD PRESSURE: 88 MMHG | WEIGHT: 119.6 LBS | HEIGHT: 60 IN | BODY MASS INDEX: 23.48 KG/M2

## 2019-02-25 DIAGNOSIS — I10 ESSENTIAL HYPERTENSION: Primary | ICD-10-CM

## 2019-02-25 DIAGNOSIS — I25.10 ASCVD (ARTERIOSCLEROTIC CARDIOVASCULAR DISEASE): ICD-10-CM

## 2019-02-25 DIAGNOSIS — E11.42 CONTROLLED TYPE 2 DIABETES MELLITUS WITH DIABETIC POLYNEUROPATHY, WITHOUT LONG-TERM CURRENT USE OF INSULIN (HCC): ICD-10-CM

## 2019-02-25 DIAGNOSIS — M15.9 PRIMARY OSTEOARTHRITIS INVOLVING MULTIPLE JOINTS: ICD-10-CM

## 2019-02-25 DIAGNOSIS — I42.9 CARDIOMYOPATHY, UNSPECIFIED TYPE (HCC): ICD-10-CM

## 2019-02-25 DIAGNOSIS — E78.2 MIXED HYPERLIPIDEMIA: ICD-10-CM

## 2019-02-25 LAB
A-G RATIO,AGRAT: 1.2 RATIO
ALBUMIN SERPL-MCNC: 4.1 G/DL (ref 3.9–5.4)
ALP SERPL-CCNC: 80 U/L (ref 38–126)
ALT SERPL-CCNC: 29 U/L (ref 9–52)
ANION GAP SERPL CALC-SCNC: 10 MMOL/L
AST SERPL W P-5'-P-CCNC: 33 U/L (ref 14–36)
BILIRUB SERPL-MCNC: 0.6 MG/DL (ref 0.2–1.3)
BUN SERPL-MCNC: 15 MG/DL (ref 9–20)
BUN/CREATININE RATIO,BUCR: 19 RATIO
CALCIUM SERPL-MCNC: 11.1 MG/DL (ref 8.4–10.2)
CHLORIDE SERPL-SCNC: 104 MMOL/L (ref 98–107)
CHOL/HDL RATIO,CHHD: 4 RATIO (ref 0–4)
CHOLEST SERPL-MCNC: 213 MG/DL (ref 0–200)
CK SERPL-CCNC: 79 U/L (ref 30–135)
CO2 SERPL-SCNC: 29 MMOL/L (ref 22–32)
CREAT SERPL-MCNC: 0.8 MG/DL (ref 0.8–1.5)
GLOBULIN,GLOB: 3.3
GLUCOSE SERPL-MCNC: 111 MG/DL (ref 75–110)
HDLC SERPL-MCNC: 60 MG/DL (ref 35–130)
LDL/HDL RATIO,LDHD: 2 RATIO
LDLC SERPL CALC-MCNC: 135 MG/DL (ref 0–130)
POTASSIUM SERPL-SCNC: 5 MMOL/L (ref 3.6–5)
PROT SERPL-MCNC: 7.4 G/DL (ref 6.3–8.2)
SODIUM SERPL-SCNC: 143 MMOL/L (ref 137–145)
TRIGL SERPL-MCNC: 89 MG/DL (ref 0–200)
VLDLC SERPL CALC-MCNC: 18 MG/DL

## 2019-02-25 NOTE — PATIENT INSTRUCTIONS

## 2019-02-25 NOTE — PROGRESS NOTES
Reny Cornea  Identified pt with two pt identifiers(name and ). Chief Complaint Patient presents with  Hypertension 3 month follow up  Diabetes  Anxiety 1. Have you been to the ER, urgent care clinic since your last visit? Hospitalized since your last visit? No 
 
2. Have you seen or consulted any other health care providers outside of the [address] [place] [address] since your last visit? Include any pap smears or colon screening. No 
 
 
Health Maintenance Topics with due status: Overdue Topic Date Due DTaP/Tdap/Td series 1960 Shingrix Vaccine Age 50> 1989 Health Maintenance Topics with due status: Due Soon Topic Date Due  
 FOOT EXAM Q1 2019 Health Maintenance Topics with due status: Not Due Topic Last Completion Date COLONOSCOPY 2016 MEDICARE YEARLY EXAM 2018 MICROALBUMIN Q1 2018 GLAUCOMA SCREENING Q2Y 10/17/2018 EYE EXAM RETINAL OR DILATED 10/17/2018 HEMOGLOBIN A1C Q6M 2018 LIPID PANEL Q1 2018 Health Maintenance Topics with due status: Completed Topic Last Completion Date Pneumococcal 65+ Low/Medium Risk 2015 Influenza Age 5 to Adult 2018 Medication reconciliation up to date and corrected with patient at this time. Today's provider has been notified of reason for visit, vitals and flowsheets obtained on patients. Reviewed record in preparation for visit, huddled with provider and have obtained necessary documentation. Wt Readings from Last 3 Encounters:  
19 119 lb 9.6 oz (54.3 kg)  
18 119 lb 9.6 oz (54.3 kg) 18 116 lb 12.8 oz (53 kg) Temp Readings from Last 3 Encounters:  
19 97.7 °F (36.5 °C) (Oral) 18 97.7 °F (36.5 °C) (Oral) 18 97.3 °F (36.3 °C) (Oral) BP Readings from Last 3 Encounters:  
19 138/88  
18 120/82  
18 118/68 Pulse Readings from Last 3 Encounters:  
02/25/19 94  
11/19/18 60  
08/06/18 60 Vitals:  
 02/25/19 7730 BP: 138/88 Pulse: 94 Resp: 15 Temp: 97.7 °F (36.5 °C) TempSrc: Oral  
SpO2: 98% Weight: 119 lb 9.6 oz (54.3 kg) Height: 4' 11\" (1.499 m) PainSc:   0 - No pain Learning Assessment: 
:  
 
Learning Assessment 8/7/2017 PRIMARY LEARNER Patient CO-LEARNER CAREGIVER No  
PRIMARY LANGUAGE ENGLISH  
LEARNER PREFERENCE PRIMARY DEMONSTRATION  
ANSWERED BY patient RELATIONSHIP SELF Depression Screening: 
:  
 
3 most recent PHQ Screens 11/19/2018 Little interest or pleasure in doing things Not at all Feeling down, depressed, irritable, or hopeless Not at all Total Score PHQ 2 0 No flowsheet data found. Fall Risk Assessment: 
:  
 
Fall Risk Assessment, last 12 mths 11/19/2018 Able to walk? Yes Fall in past 12 months? No  
 
 
Abuse Screening: 
:  
 
Abuse Screening Questionnaire 5/1/2018 8/7/2017 Do you ever feel afraid of your partner? [place] Th[place] Are you in a relationship with someone who physically or mentally threatens you? Rosa Maria Dass Is it safe for you to go home? Y Y  
 
 
ADL Screening: 
:  
 

## 2019-02-26 LAB
EST. AVERAGE GLUCOSE BLD GHB EST-MCNC: 128 MG/DL
HBA1C MFR BLD: 6.1 % (ref 4.8–5.6)

## 2019-04-22 DIAGNOSIS — I10 ESSENTIAL HYPERTENSION: ICD-10-CM

## 2019-04-22 RX ORDER — METOPROLOL SUCCINATE 25 MG/1
TABLET, EXTENDED RELEASE ORAL
Qty: 90 TAB | Refills: 3 | Status: SHIPPED | OUTPATIENT
Start: 2019-04-22 | End: 2020-04-30 | Stop reason: SDUPTHER

## 2019-04-22 NOTE — TELEPHONE ENCOUNTER
RX refill request from the patient/pharmacy. Patient last seen 02- with labs, and next appt. scheduled for 06-  Requested Prescriptions     Pending Prescriptions Disp Refills    metoprolol succinate (TOPROL-XL) 25 mg XL tablet [Pharmacy Med Name: METOPROLOL SUCC ER 25 MG TAB] 90 Tab 3     Sig: TAKE 1 TAB BY MOUTH DAILY   .

## 2019-06-10 ENCOUNTER — OFFICE VISIT (OUTPATIENT)
Dept: INTERNAL MEDICINE CLINIC | Age: 79
End: 2019-06-10

## 2019-06-10 VITALS
OXYGEN SATURATION: 96 % | WEIGHT: 114.8 LBS | HEIGHT: 60 IN | TEMPERATURE: 97.9 F | DIASTOLIC BLOOD PRESSURE: 80 MMHG | BODY MASS INDEX: 22.54 KG/M2 | SYSTOLIC BLOOD PRESSURE: 128 MMHG | HEART RATE: 63 BPM | RESPIRATION RATE: 18 BRPM

## 2019-06-10 DIAGNOSIS — I42.9 CARDIOMYOPATHY, UNSPECIFIED TYPE (HCC): ICD-10-CM

## 2019-06-10 DIAGNOSIS — I25.10 ASCVD (ARTERIOSCLEROTIC CARDIOVASCULAR DISEASE): ICD-10-CM

## 2019-06-10 DIAGNOSIS — E78.2 MIXED HYPERLIPIDEMIA: ICD-10-CM

## 2019-06-10 DIAGNOSIS — I10 ESSENTIAL HYPERTENSION: Primary | ICD-10-CM

## 2019-06-10 DIAGNOSIS — E11.42 CONTROLLED TYPE 2 DIABETES MELLITUS WITH DIABETIC POLYNEUROPATHY, WITHOUT LONG-TERM CURRENT USE OF INSULIN (HCC): ICD-10-CM

## 2019-06-10 DIAGNOSIS — M15.9 PRIMARY OSTEOARTHRITIS INVOLVING MULTIPLE JOINTS: ICD-10-CM

## 2019-06-10 LAB
A-G RATIO,AGRAT: 1.3 RATIO
ALBUMIN SERPL-MCNC: 3.9 G/DL (ref 3.9–5.4)
ALP SERPL-CCNC: 86 U/L (ref 38–126)
ALT SERPL-CCNC: 25 U/L (ref 9–52)
ANION GAP SERPL CALC-SCNC: 9 MMOL/L
AST SERPL W P-5'-P-CCNC: 33 U/L (ref 14–36)
BILIRUB SERPL-MCNC: 0.4 MG/DL (ref 0.2–1.3)
BUN SERPL-MCNC: 15 MG/DL (ref 9–20)
BUN/CREATININE RATIO,BUCR: 19 RATIO
CALCIUM SERPL-MCNC: 10.4 MG/DL (ref 8.4–10.2)
CHLORIDE SERPL-SCNC: 107 MMOL/L (ref 98–107)
CHOL/HDL RATIO,CHHD: 4 RATIO (ref 0–4)
CHOLEST SERPL-MCNC: 178 MG/DL (ref 0–200)
CO2 SERPL-SCNC: 27 MMOL/L (ref 22–32)
CREAT SERPL-MCNC: 0.8 MG/DL (ref 0.8–1.5)
GLOBULIN,GLOB: 3
GLUCOSE SERPL-MCNC: 94 MG/DL (ref 75–110)
HDLC SERPL-MCNC: 46 MG/DL (ref 35–130)
LDL/HDL RATIO,LDHD: 3 RATIO
LDLC SERPL CALC-MCNC: 115 MG/DL (ref 0–130)
POTASSIUM SERPL-SCNC: 5.1 MMOL/L (ref 3.6–5)
PROT SERPL-MCNC: 6.9 G/DL (ref 6.3–8.2)
SODIUM SERPL-SCNC: 143 MMOL/L (ref 137–145)
TRIGL SERPL-MCNC: 84 MG/DL (ref 0–200)
VLDLC SERPL CALC-MCNC: 17 MG/DL

## 2019-06-10 NOTE — PROGRESS NOTES
Chief Complaint   Patient presents with    Hypertension     3 month follow up    Diabetes    Cholesterol Problem     Blood pressure 148/79, pulse 63, temperature 97.9 °F (36.6 °C), temperature source Oral, resp. rate 18, height 4' 11\" (1.499 m), weight 114 lb 12.8 oz (52.1 kg), SpO2 96 %. 1. Have you been to the ER, urgent care clinic since your last visit? Hospitalized since your last visit? No    2. Have you seen or consulted any other health care providers outside of the [address] [place] [address] since your last visit? Include any pap smears or colon screening.  6/7/19 Gilles Camejo-podiatrist

## 2019-06-10 NOTE — PROGRESS NOTES
Chief Complaint   Patient presents with    Hypertension     3 month follow up    Diabetes    Cholesterol Problem       SUBJECTIVE:    Irina Hendricks is a 77 y.o. male who returns in follow-up of his medical problems include hypertension, hyperlipidemia, diabetes, cardiomyopathy, ASCVD, DJD and other medical problems. He is taking his medication trying to follow his diet and try to get some exercise. He currently denies any chest pain, shortness of breath, palpitations, PND, orthopnea or other cardiorespiratory complaints. He denies any GI or  complaints. He denies any headaches, dizziness or neurologic complaints. He has no current arthritic complaints and there are no other complaints on complete review of systems. Current Outpatient Medications   Medication Sig Dispense Refill    metoprolol succinate (TOPROL-XL) 25 mg XL tablet TAKE 1 TAB BY MOUTH DAILY 90 Tab 3    OTHER       omeprazole (PRILOSEC) 20 mg capsule TAKE ONE CAPSULE BY MOUTH ONCE DAILY 90 Cap 3    ramipril (ALTACE) 10 mg capsule TAKE TWO CAPSULES BY MOUTH ONCE DAILY 180 Cap 3    polyethylene glycol (MIRALAX) 17 gram/dose powder MIX AND TAKE 17 GRAMS BY MOUTH DAILY 510 g 3    Blood-Glucose Meter (TRUE METRIX GLUCOSE METER) misc Use daily as needed for diabetes management. Diagnosis E11.9 1 Each 0    glucose blood VI test strips (TRUE METRIX GLUCOSE TEST STRIP) strip Use 1 to 2 times daily as needed for Diabetes management. Diagnosis is 411.9. 50 Strip 5    glucose blood VI test strips (TRUETEST TEST STRIPS) strip by Does Not Apply route See Admin Instructions. 100 Strip prn    UBIDECARENONE (CO Q-10 PO) Take  by mouth.  OTHER Take 7 Tabs by mouth daily. [facility]      aspirin 81 mg tablet Take 81 mg by mouth daily.  Indications: taking one a day       Past Medical History:   Diagnosis Date    Anxiety 8/5/2017    Arrhythmia     Arthritis     Atherosclerotic heart disease of native coronary artery without angina pectoris 8/5/2017    Bradycardia 8/5/2017    CAD (coronary artery disease)     Constipation, chronic 8/5/2017    Diabetes ([facility] [address]     diet controlled    Diabetes mellitus ([facility] [address] 8/5/2017    Diverticulosis 8/5/2017    ED (erectile dysfunction) 8/5/2017    GERD (gastroesophageal reflux disease)     Hypertension     Hypogonadism male 8/5/2017    Kidney stone     Neuropathy 8/5/2017    On statin therapy 8/5/2017    Right foot pain 8/5/2017     Past Surgical History:   Procedure Laterality Date    CARDIAC SURG PROCEDURE UNLIST      cabg x4 vessels 1999    COLONOSCOPY N/A 6/21/2016    COLONOSCOPY performed by Aristeo Nguyen MD at [facility]; HI RISK IND  6/21/2016         HX GI      COLONOSCOPY    HX HEART CATHETERIZATION      HX OTHER SURGICAL      artificial testicle    HX PACEMAKER  10/6/15    [place] COLONOSCOPY FLX DX W/COLLJ SPEC WHEN PFRMD  4/8/2011         UPPER GI ENDOSCOPY,BIOPSY  12/14/2016          Allergies   Allergen Reactions    Caffeine Unknown (comments)     Sweating AND WOOZY AND CAN'T SLEEP    Codeine Other (comments)     Lightheaded, WOOZY AND CAN'T SLEEP    Percocet [Oxycodone-Acetaminophen] Nausea and Vomiting       REVIEW OF SYSTEMS:  General: negative for - chills or fever, or weight loss or gain  ENT: negative for - headaches, nasal congestion or tinnitus  Eyes: no blurred or visual changes  Neck: No stiffness or swollen nodes  Respiratory: negative for - cough, hemoptysis, shortness of breath or wheezing  Cardiovascular : negative for - chest pain, edema, palpitations or shortness of breath  Gastrointestinal: negative for - abdominal pain, blood in stools, heartburn or nausea/vomiting  Genito-Urinary: no dysuria, trouble voiding, or hematuria  Musculoskeletal: negative for - gait disturbance, joint pain, joint stiffness or joint swelling  Neurological: no TIA or stroke symptoms  Hematologic: no bruises, no bleeding  Lymphatic: no swollen glands  Integument: no lumps, mole changes, nail changes or rash  Endocrine:no malaise/lethargy poly uria or polydipsia or unexpected weight changes        Social History     Socioeconomic History    Marital status:      Spouse name: Not on file    Number of children: Not on file    Years of education: Not on file    Highest education level: Not on file   Tobacco Use    Smoking status: Never Smoker    Smokeless tobacco: Never Used   Substance and Sexual Activity    Alcohol use: No    Drug use: No     Family History   Problem Relation Age of Onset    Heart Disease Mother     No Known Problems Father     Cancer Sister     Diabetes Brother     Anesth Problems Neg Hx        OBJECTIVE:     Visit Vitals  /80   Pulse 63   Temp 97.9 °F (36.6 °C) (Oral)   Resp 18   Ht 4' 11\" (1.499 m)   Wt 114 lb 12.8 oz (52.1 kg)   SpO2 96%   BMI 23.19 kg/m²     CONSTITUTIONAL:   well nourished, appears age appropriate  EYES: sclera anicteric, PERRL, EOMI  ENMT:nares clear, moist mucous membranes, pharynx clear  NECK: supple. Thyroid normal, No JVD or bruits  RESPIRATORY: Chest: clear to ascultation and percussion, normal inspiratory effort  CARDIOVASCULAR: Heart: regular rate and rhythm no murmurs, rubs or gallops, PMI not displaced, No thrills  GASTROINTESTINAL: Abdomen: non distended, soft, non tender, bowel sounds normal  HEMATOLOGIC: no purpura, petechiae or bruising  LYMPHATIC: No lymph node enlargemant  MUSCULOSKELETAL: Extremities: no edema or active synovitis, pulse 1+   INTEGUMENT: No unusual rashes or suspicious skin lesions noted. Nails appear normal.  PERIPHERAL VASCULAR: normal pulses femoral, PT and DP  NEUROLOGIC: non-focal exam, A & O X 3  PSYCHIATRIC:, appropriate affect     ASSESSMENT:   1. Essential hypertension    2. Controlled type 2 diabetes mellitus with diabetic polyneuropathy, without long-term current use of insulin ([address]    3. Mixed hyperlipidemia    4.  ASCVD (arteriosclerotic cardiovascular disease)    5. Cardiomyopathy, unspecified type ([facility] [address]    6. Primary osteoarthritis involving multiple joints      Impression  1. Hypertension is controlled to continue current therapy reviewed with him. 2.  Diabetes repeat status pending a prior lab review not make adjustments if necessary. 3.  Hyperlipidemia prior lab reviewed and repeat status pending and I will adjust if needed. 4   ASCVD clinically stable continue aspirin daily   5   Cardiomyopathy stable  6. DJD that is stable  I will call with lab results and make further recommendations or adjustments if necessary. Follow-up scheduled for 3 months or sooner should to be a problem. PLAN:  . No orders of the defined types were placed in this encounter. ATTENTION:   This medical record was transcribed using an electronic medical records system. Although proofread, it may and can contain electronic and spelling errors. Other human spelling and other errors may be present. Corrections may be executed at a later time. Please feel free to contact us for any clarifications as needed. Follow-up and Dispositions    · Return in about 3 months (around 9/10/2019). No results found for any visits on 06/10/19. Montana Forte MD    The patient verbalized understanding of the problems and plans as explained.

## 2019-06-10 NOTE — PATIENT INSTRUCTIONS
Learning About Coronary Artery Disease (CAD) What is coronary artery disease? Coronary artery disease (CAD) occurs when plaque builds up in the arteries that bring oxygen-rich blood to your heart. Plaque is a fatty substance made of cholesterol, calcium, and other substances in the blood. This process is called hardening of the arteries, or atherosclerosis. What happens when you have coronary artery disease? · Plaque may narrow the coronary arteries. Narrowed arteries cause poor blood flow. This can lead to angina symptoms such as chest pain or discomfort. If blood flow is completely blocked, you could have a heart attack. · You can slow CAD and reduce the risk of future problems by making changes in your lifestyle. These include quitting smoking and eating heart-healthy foods. · Treatments for CAD, along with changes in your lifestyle, can help you live a longer and healthier life. How can you prevent coronary artery disease? · Do not smoke. It may be the best thing you can do to prevent heart disease. If you need help quitting, talk to your doctor about stop-smoking programs and medicines. These can increase your chances of quitting for good. · Be active. Get at least 30 minutes of exercise on most days of the week. Walking is a good choice. You also may want to do other activities, such as running, swimming, cycling, or playing tennis or team sports. · Eat heart-healthy foods. Eat more fruits and vegetables and less foods that contain saturated and trans fats. Limit alcohol, sodium, and sweets. · Stay at a healthy weight. Lose weight if you need to. · Manage other health problems such as diabetes, high blood pressure, and high cholesterol. · Manage stress. Stress can hurt your heart. To keep stress low, talk about your problems and feelings. Don't keep your feelings hidden.  
· If you have talked about it with your doctor, take a low-dose aspirin every day. Aspirin can help certain people lower their risk of a heart attack or stroke. But taking aspirin isn't right for everyone, because it can cause serious bleeding. Do not start taking daily aspirin unless your doctor knows about it. How is coronary artery disease treated? · Your doctor will suggest that you make lifestyle changes. For example, your doctor may ask you to eat healthy foods, quit smoking, lose extra weight, and be more active. · You will have to take medicines. · Your doctor may suggest a procedure to open narrowed or blocked arteries. This is called angioplasty. Or your doctor may suggest using healthy blood vessels to create detours around narrowed or blocked arteries. This is called bypass surgery. Follow-up care is a key part of your treatment and safety. Be sure to make and go to all appointments, and call your doctor if you are having problems. It's also a good idea to know your test results and keep a list of the medicines you take. Where can you learn more? Go to http://estrella-david.info/. Enter ([phone] 9827 2269 in the search box to learn more about \"Learning About Coronary Artery Disease (CAD). \" Current as of: July 22, 2018 Content Version: 11.9 © 4681-7676 [employer], [employer]. Care instructions adapted under license by [facility] (which disclaims liability or warranty for this information). If you have questions about a medical condition or this instruction, always ask your healthcare professional. [place] [address] any warranty or liability for your use of this information.

## 2019-06-11 LAB
EST. AVERAGE GLUCOSE BLD GHB EST-MCNC: 128 MG/DL
HBA1C MFR BLD: 6.1 % (ref 4.8–5.6)

## 2019-06-24 ENCOUNTER — TELEPHONE (OUTPATIENT)
Dept: INTERNAL MEDICINE CLINIC | Age: 79
End: 2019-06-24

## 2019-06-24 NOTE — TELEPHONE ENCOUNTER
Patient is taking Maalox in the morning and he wants to know is it alright to take his other medications at the same time.

## 2019-06-24 NOTE — TELEPHONE ENCOUNTER
Called and spoke to patient  Two pt identifiers confirmed  Advised pt it was ok to take Maalox with other medications but to allow the Maalox to coat stomach for about 15 minutes before taking other meds. Patient verbalized understanding of information discussed  with no further questions at this time.

## 2019-08-02 NOTE — PROGRESS NOTES
This is a Subsequent Medicare Annual Wellness Visit providing Personalized Prevention Plan Services (PPPS) (Performed 12 months after initial AWV and PPPS )    I have reviewed the patient's medical history in detail and updated the computerized patient record. He presents today for his Medicare subsequent annual wellness examination and screening questionnaire. He is also here in follow-up of his multiple medical problems include hypertension, diabetes, hyperlipidemia, ASCVD, cardiomyopathy, DJD and other medical problems. He is taking his medications and trying to follow his diet and trying to get some exercise on a regular basis. He currently denies any chest pain, shortness of breath, palpitations, PND, orthopnea or or other cardiorespiratory complaints. He notes no GI or  complaints. He notes no headaches, dizziness or neurologic complaints. He has no current change of his chronic arthritic complaints and there are no other complaints on complete review of systems.     History     Past Medical History:   Diagnosis Date    Anxiety 8/5/2017    Arrhythmia     Arthritis     Atherosclerotic heart disease of native coronary artery without angina pectoris 8/5/2017    Bradycardia 8/5/2017    CAD (coronary artery disease)     Constipation, chronic 8/5/2017    Diabetes ([address]     diet controlled    Diabetes mellitus ([address] 8/5/2017    Diverticulosis 8/5/2017    ED (erectile dysfunction) 8/5/2017    GERD (gastroesophageal reflux disease)     Hypertension     Hypogonadism male 8/5/2017    Kidney stone     Neuropathy 8/5/2017    On statin therapy 8/5/2017    Right foot pain 8/5/2017      Past Surgical History:   Procedure Laterality Date    CARDIAC SURG PROCEDURE UNLIST      cabg x4 vessels 1999    COLONOSCOPY N/A 6/21/2016    COLONOSCOPY performed by Pablo Alex [facility] at [facility]; HI RISK IND  6/21/2016         HX GI      COLONOSCOPY    HX HEART CATHETERIZATION      HX OTHER SURGICAL      artificial testicle    HX PACEMAKER  10/6/15    GA COLONOSCOPY FLX DX W/COLLJ SPEC WHEN PFRMD  4/8/2011         UPPER GI ENDOSCOPY,BIOPSY  12/14/2016          Social History     Tobacco Use    Smoking status: Never Smoker    Smokeless tobacco: Never Used   Substance Use Topics    Alcohol use: No    Drug use: No     Current Outpatient Medications   Medication Sig Dispense Refill    metoprolol succinate (TOPROL-XL) 25 mg XL tablet TAKE 1 TAB BY MOUTH DAILY 90 Tab 3    OTHER       omeprazole (PRILOSEC) 20 mg capsule TAKE ONE CAPSULE BY MOUTH ONCE DAILY 90 Cap 3    ramipril (ALTACE) 10 mg capsule TAKE TWO CAPSULES BY MOUTH ONCE DAILY 180 Cap 3    polyethylene glycol (MIRALAX) 17 gram/dose powder MIX AND TAKE 17 GRAMS BY MOUTH DAILY 510 g 3    Blood-Glucose Meter (TRUE METRIX GLUCOSE METER) misc Use daily as needed for diabetes management. Diagnosis E11.9 1 Each 0    glucose blood VI test strips (TRUE METRIX GLUCOSE TEST STRIP) strip Use 1 to 2 times daily as needed for Diabetes management. Diagnosis is 411.9. 50 Strip 5    glucose blood VI test strips (TRUETEST TEST STRIPS) strip by Does Not Apply route See Admin Instructions. 100 Strip prn    UBIDECARENONE (CO Q-10 PO) Take  by mouth.  OTHER Take 7 Tabs by mouth daily. HEART HEALTH FROM [employer]      aspirin 81 mg tablet Take 81 mg by mouth daily.  Indications: taking one a day       Allergies   Allergen Reactions    Caffeine Unknown (comments)     Sweating AND WOOZY AND CAN'T SLEEP    Codeine Other (comments)     Lightheaded, WOOZY AND CAN'T SLEEP    Percocet [Oxycodone-Acetaminophen] Nausea and Vomiting     Family History   Problem Relation Age of Onset    Heart Disease Mother     No Known Problems Father     Cancer Sister     Diabetes Brother     Anesth Problems Neg Hx        Patient Active Problem List    Diagnosis    Controlled type 2 diabetes mellitus with diabetic polyneuropathy, without long-term current use of insulin [facility]    Cardiomyopathy ([facility]    ASCVD (arteriosclerotic cardiovascular disease)    Essential hypertension    Mixed hyperlipidemia    Primary osteoarthritis involving multiple joints    Anxiety    Pain of right hand    Epigastric pain    Medicare annual wellness visit, subsequent    Colon cancer screening    Diverticulosis    Neuropathy    Hypogonadism male    ED (erectile dysfunction)    Constipation, chronic    Bradycardia       Patient Care Team:  Yajaira Blandon MD as PCP - General (Internal Medicine)    Depression Risk Factor Screening:     3 most recent PHQ Screens 8/5/2019   Little interest or pleasure in doing things Not at all   Feeling down, depressed, irritable, or hopeless Not at all   Total Score PHQ 2 0     Alcohol Risk Factor Screening: You do not drink alcohol or very rarely. Functional Ability and Level of Safety:     Fall Risk     Fall Risk Assessment, last 12 mths 8/5/2019   Able to walk? Yes   Fall in past 12 months? No       Hearing Loss   mild    Activities of Daily Living   Self-care.    ADL Assessment 2/25/2019   Feeding yourself No Help Needed   Getting from bed to chair No Help Needed   Getting dressed No Help Needed   Bathing or showering No Help Needed   Walk across the room (includes cane/walker) No Help Needed   Using the telphone No Help Needed   Taking your medications No Help Needed   Preparing meals No Help Needed   Managing money (expenses/bills) No Help Needed   Moderately strenuous housework (laundry) No Help Needed   Shopping for personal items (toiletries/medicines) No Help Needed   Shopping for groceries No Help Needed   Driving No Help Needed   Climbing a flight of stairs No Help Needed   Getting to places beyond walking distances No Help Needed       Abuse Screen   Patient is not abused    Social History     Social History Narrative    Not on file       Review of Systems      ROS:    Constitutional: He denies fevers, weight loss, sweats. Eyes: No blurred or double vision. ENT: No difficulty with swallowing, taste, speech or smell. Neck: no stiffness or swelling  Respiratory: No cough wheezing or shortness of breath. Cardiovascular: Denies chest pain, palpitations, unexplained indigestion or syncope. Gastrointestinal:  No changes in bowel movements, no abdominal pain, no bloating. Genitourinary:  He denies frequency, nocturia or stranguria. Extremities: No change of his chronic joint pain, stiffness or swelling. Neurological:  No numbness, tingling, burring paresthesias or loss of motor strength. No syncope, dizziness or frequent headache  Lymphatic: no adenopathy noted  Hematologic: no easy bruising or bleeding gums  Skin:  No recent rashes or mole changes. Psychiatric/Behavioral:  Negative for depression. Physical Examination     Evaluation of Cognitive Function:  Mood/affect:  happy  Appearance: age appropriate  Family member/caregiver input: none    Visit Vitals  /86   Pulse 77   Temp 98.3 °F (36.8 °C) (Oral)   Resp 18   Ht 4' 11\" (1.499 m)   Wt 114 lb 3.2 oz (51.8 kg)   SpO2 98%   BMI 23.07 kg/m²     Vitals:    08/05/19 1035 08/05/19 1113   BP: 110/90 112/86   Pulse: 77    Resp: 18    Temp: 98.3 °F (36.8 °C)    TempSrc: Oral    SpO2: 98%    Weight: 114 lb 3.2 oz (51.8 kg)    Height: 4' 11\" (1.499 m)    PainSc:   0 - No pain         PHYSICAL EXAM:    General appearance - alert, well appearing, and in no distress  Mental status - alert, oriented to person, place, and time  HEENT:  Ears - bilateral TM's and external ear canals clear  Eyes - pupillary responses were normal.  Extraocular muscle function intact. Lids and conjunctiva not injected. Fundoscopic exam revealed sharp disc margins. eye movements intact  Pharynx- clear with teeth in good repair. No masses were noted  Neck - supple without thyromegaly or burit.   No JVD noted  Lungs - clear to auscultation and percussion  Cardiac- normal rate, regular rhythm without murmurs. PMI not displaced. No gallop, rub or click  Abdomen - flat, soft, non-tender without palpable organomegaly or mass. No pulsatile mass was felt, and not bruit was heard. Bowel sounds were active  : Circumcised, Testes descended w/o masses  Rectal: normal sphincter tone, prostate 1+ enlarged without nodules irregularities, no masses, stool brown and hemacult negative  Extremities -  no clubbing cyanosis or edema  Lymphatics - no palpable lymphadenopathy, no hepatosplenomegaly  Hematologic: no petechiae or purpura  Peripheral vascular -Femoral, Dorsalis pedis and posterior tibial pulses felt without difficulty  Skin - no rash or unusual mole change noted  Neurological - Cranial nerves II-XII grossly intact. Motor strength 5/5. DTR's 2+ and symmetric. Station and gait normal  Back exam - full range of motion, no tenderness, palpable spasm or pain on motion  Musculoskeletal - no joint tenderness, deformity or swelling        Results for orders placed or performed in visit on 06/10/19   HEMOGLOBIN A1C WITH EAG   Result Value Ref Range    Hemoglobin A1c 6.1 (H) 4.8 - 5.6 %    Estimated average glucose 942 mg/dL   METABOLIC PANEL, COMPREHENSIVE   Result Value Ref Range    Glucose 94 75 - 110 mg/dL    BUN 15.0 9.0 - 20.0 mg/dL    Creatinine 0.8 0.8 - 1.5 mg/dL    Sodium 143 137 - 145 mmol/L    Potassium 5.1 (H) 3.6 - 5.0 mmol/L    Chloride 107 98 - 107 mmol/L    CO2 27.0 22.0 - 32.0 mmol/L    Calcium 10.4 (H) 8.4 - 10.2 mg/dl    Protein, total 6.9 6.3 - 8.2 g/dL    Albumin 3.9 3.9 - 5.4 g/dL    AST (SGOT) 33.0 14.0 - 36.0 U/L    ALT (SGPT) 25 9 - 52 U/L    Alk.  phosphatase 86 38 - 126 U/L    Bilirubin, total 0.4 0.2 - 1.3 mg/dL    BUN/Creatinine ratio 19 Ratio    GFR est AA >60 mL/min/1.73m2    GFR est non-AA >60 mL/min/1.73m2    Globulin 3.00     A-G Ratio 1.3 Ratio    Anion gap 9 mmol/L   LIPID PANEL   Result Value Ref Range    Cholesterol, total 178 0 - 200 mg/dL    Triglyceride 84 0 - 200 mg/dL    HDL Cholesterol 46 35 - 130 mg/dL    VLDL 17 mg/dL    LDL, calculated 115 0 - 130 mg/dL    CHOL/HDL Ratio 4 0 - 4 Ratio    LDL/HDL Ratio 3 Ratio       Advice/Referrals/Counseling   Education and counseling provided:  Are appropriate based on today's review and evaluation  End-of-Life planning (with patient's consent)  Pneumococcal Vaccine  Influenza Vaccine  Colorectal cancer screening tests      Assessment/Plan     ASSESSMENT:   1. Essential hypertension    2. Controlled type 2 diabetes mellitus with diabetic polyneuropathy, without long-term current use of insulin ([facility]    3. Mixed hyperlipidemia    4. Primary osteoarthritis involving multiple joints    5. Cardiomyopathy, unspecified type ([facility] [address] [facility]    6. ASCVD (arteriosclerotic cardiovascular disease)    7. Anxiety    8. Medicare annual wellness visit, subsequent      Impression  1. Hypertension that is controlled so continue current therapy reviewed with him. 2.  Diabetes repeat status pending and prior lab review not make adjustments if necessary. 3.  Hyperlipidemia prior lab reviewed and repeat status pending and I will adjust if needed. 4. DJD that is stable   5   Cardiomyopathy currently stable EKG obtained today reveals no acute process. 6.  ASCVD clinically stable continue aspirin daily   7   Anxiety that is stable  Medicare subsequent annual wellness examination screening questionnaire is completed today. The results were reviewed with him and his questions were answered. Lifestyle recommendations and modifications discussed and made. I will call the lab results and make further recommendations or adjustments if necessary. Follow-up as scheduled for 3 months or sooner should to be a problem.     PLAN:  .  Orders Placed This Encounter    CBC WITH AUTOMATED DIFF ([facility])    METABOLIC PANEL, COMPREHENSIVE ([facility])    LIPID PANEL ([facility])    CK ([facility])    HEMOGLOBIN A1C W/O EAG ([facility])    T4, FREE ([facility])    TSH 3RD GENERATION ([facility])    URINALYSIS W/O MICRO ([facility])    URINE, MICROALBUMIN, SEMIQUANTITATIVE ([facility])    AMB POC EKG ROUTINE W/ 12 LEADS, INTER & REP         ATTENTION:   This medical record was transcribed using an electronic medical records system. Although proofread, it may and can contain electronic and spelling errors. Other human spelling and other errors may be present. Corrections may be executed at a later time. Please feel free to contact us for any clarifications as needed. Follow-up and Dispositions    · Return in about 3 months (around 11/5/2019). Kishore Santiago MD    Recommended healthy diet low in carbohydrates, fats, sodium and cholesterol. Recommended regular cardiovascular exercise 3-6 times per week for 30-60 minutes daily. Current Outpatient Medications   Medication Sig Dispense Refill    metoprolol succinate (TOPROL-XL) 25 mg XL tablet TAKE 1 TAB BY MOUTH DAILY 90 Tab 3    OTHER       omeprazole (PRILOSEC) 20 mg capsule TAKE ONE CAPSULE BY MOUTH ONCE DAILY 90 Cap 3    ramipril (ALTACE) 10 mg capsule TAKE TWO CAPSULES BY MOUTH ONCE DAILY 180 Cap 3    polyethylene glycol (MIRALAX) 17 gram/dose powder MIX AND TAKE 17 GRAMS BY MOUTH DAILY 510 g 3    Blood-Glucose Meter (TRUE METRIX GLUCOSE METER) misc Use daily as needed for diabetes management. Diagnosis E11.9 1 Each 0    glucose blood VI test strips (TRUE METRIX GLUCOSE TEST STRIP) strip Use 1 to 2 times daily as needed for Diabetes management. Diagnosis is 411.9. 50 Strip 5    glucose blood VI test strips (TRUETEST TEST STRIPS) strip by Does Not Apply route See Admin Instructions. 100 Strip prn    UBIDECARENONE (CO Q-10 PO) Take  by mouth.  OTHER Take 7 Tabs by mouth daily. HEART HEALTH FROM [employer]      aspirin 81 mg tablet Take 81 mg by mouth daily.  Indications: taking one a day         No results found for any visits on 08/05/19. Verbal and written instructions (see AVS) provided. Patient expresses understanding of diagnosis and treatment plan.     Merari Rodriguez MD

## 2019-08-05 ENCOUNTER — OFFICE VISIT (OUTPATIENT)
Dept: INTERNAL MEDICINE CLINIC | Age: 79
End: 2019-08-05

## 2019-08-05 VITALS
RESPIRATION RATE: 18 BRPM | HEART RATE: 77 BPM | BODY MASS INDEX: 22.42 KG/M2 | WEIGHT: 114.2 LBS | OXYGEN SATURATION: 98 % | TEMPERATURE: 98.3 F | SYSTOLIC BLOOD PRESSURE: 112 MMHG | DIASTOLIC BLOOD PRESSURE: 86 MMHG | HEIGHT: 60 IN

## 2019-08-05 DIAGNOSIS — I10 ESSENTIAL HYPERTENSION: Primary | ICD-10-CM

## 2019-08-05 DIAGNOSIS — E78.2 MIXED HYPERLIPIDEMIA: ICD-10-CM

## 2019-08-05 DIAGNOSIS — E11.42 CONTROLLED TYPE 2 DIABETES MELLITUS WITH DIABETIC POLYNEUROPATHY, WITHOUT LONG-TERM CURRENT USE OF INSULIN (HCC): ICD-10-CM

## 2019-08-05 DIAGNOSIS — Z00.00 MEDICARE ANNUAL WELLNESS VISIT, SUBSEQUENT: ICD-10-CM

## 2019-08-05 DIAGNOSIS — M15.9 PRIMARY OSTEOARTHRITIS INVOLVING MULTIPLE JOINTS: ICD-10-CM

## 2019-08-05 DIAGNOSIS — F41.9 ANXIETY: ICD-10-CM

## 2019-08-05 DIAGNOSIS — I25.10 ASCVD (ARTERIOSCLEROTIC CARDIOVASCULAR DISEASE): ICD-10-CM

## 2019-08-05 DIAGNOSIS — I42.9 CARDIOMYOPATHY, UNSPECIFIED TYPE (HCC): ICD-10-CM

## 2019-08-05 LAB
BILIRUB UR QL: NEGATIVE
CLARITY: CLEAR
COLOR UR: ABNORMAL
ERYTHROCYTE [DISTWIDTH] IN BLOOD BY AUTOMATED COUNT: 12.3 %
GLUCOSE 24H UR-MRATE: NEGATIVE G/(24.H)
HBA1C MFR BLD HPLC: 5.5 % (ref 4–5.7)
HCT VFR BLD AUTO: 50.8 % (ref 37–51)
HGB BLD-MCNC: 16.9 G/DL (ref 12–18)
HGB UR QL STRIP: ABNORMAL
KETONES UR QL STRIP.AUTO: NEGATIVE
LEUKOCYTE ESTERASE: NEGATIVE
LYMPHOCYTES ABSOLUTE: 2.5 K/UL (ref 0.6–4.1)
LYMPHOCYTES NFR BLD: 34.5 % (ref 10–58.5)
MCH RBC QN AUTO: 34.3 PG (ref 26–32)
MCHC RBC AUTO-ENTMCNC: 33.3 G/DL (ref 30–36)
MCV RBC AUTO: 103 FL (ref 80–97)
MICROALBUMIN, URINE: 50 MG/L (ref 0–20)
MONOCYTES ABS-DIF,2141: 0.7 K/UL (ref 0–1.8)
MONOCYTES NFR BLD: 9.2 % (ref 0.1–24)
NEUTROPHILS # BLD: 56.3 % (ref 37–92)
NEUTROPHILS ABS,2156: 4.1 K/UL (ref 2–7.8)
NITRITE UR QL STRIP.AUTO: NEGATIVE
PH UR STRIP: 7 [PH] (ref 5–7)
PLATELET # BLD AUTO: 164 K/UL (ref 140–440)
PMV BLD AUTO: 8 FL
PROT UR STRIP-MCNC: NEGATIVE MG/DL
RBC # BLD AUTO: 4.93 M/UL (ref 4.2–6.3)
RBC #/AREA URNS HPF: ABNORMAL #/HPF
SP GR UR REFRACTOMETRY: 1.01 (ref 1–1.03)
UROBILINOGEN UR QL STRIP.AUTO: NEGATIVE
WBC # BLD AUTO: 7.3 K/UL (ref 4.1–10.9)
WBC URNS QL MICRO: 0 #/HPF

## 2019-08-05 NOTE — PROGRESS NOTES
[place]  Identified pt with two pt identifiers(name and ). Chief Complaint   Patient presents with   [place] Annual Wellness Visit       1. Have you been to the ER, urgent care clinic since your last visit? Hospitalized since your last visit? No    2. Have you seen or consulted any other health care providers outside of the [address] [place] [address] since your last visit? Include any pap smears or colon screening. No      Health Maintenance Topics with due status: Overdue       Topic Date Due    DTaP/Tdap/Td series 1960    Shingrix Vaccine Age 50> 1989    Influenza Age 5 to Adult 2019     Health Maintenance Topics with due status: Due Soon       Topic Date Due    MICROALBUMIN Q1 2019    MEDICARE YEARLY EXAM 2019     Health Maintenance Topics with due status: Not Due       Topic Last Completion Date    COLONOSCOPY 2016    GLAUCOMA SCREENING Q2Y 10/17/2018    EYE EXAM RETINAL OR DILATED 10/17/2018    FOOT EXAM Q1 2019    HEMOGLOBIN A1C Q6M 06/10/2019    LIPID PANEL Q1 06/10/2019     Health Maintenance Topics with due status: Completed       Topic Last Completion Date    Pneumococcal 65+ years 2015           Medication reconciliation up to date and corrected with patient at this time. Today's provider has been notified of reason for visit, vitals and flowsheets obtained on patients. Reviewed record in preparation for visit, huddled with provider and have obtained necessary documentation.         Wt Readings from Last 3 Encounters:   19 114 lb 3.2 oz (51.8 kg)   06/10/19 114 lb 12.8 oz (52.1 kg)   19 119 lb 9.6 oz (54.3 kg)     Temp Readings from Last 3 Encounters:   19 98.3 °F (36.8 °C) (Oral)   06/10/19 97.9 °F (36.6 °C) (Oral)   19 97.7 °F (36.5 °C) (Oral)     BP Readings from Last 3 Encounters:   19 110/90   06/10/19 128/80   19 138/88     Pulse Readings from Last 3 Encounters:   19 77   06/10/19 63   19 94 Vitals:    08/05/19 1035   BP: 110/90   Pulse: 77   Resp: 18   Temp: 98.3 °F (36.8 °C)   TempSrc: Oral   SpO2: 98%   Weight: 114 lb 3.2 oz (51.8 kg)   Height: 4' 11\" (1.499 m)   PainSc:   0 - No pain         Learning Assessment:  :     Learning Assessment 8/7/2017   PRIMARY LEARNER Patient   CO-LEARNER CAREGIVER No   PRIMARY LANGUAGE ENGLISH   LEARNER PREFERENCE PRIMARY DEMONSTRATION   ANSWERED BY patient   RELATIONSHIP SELF       Depression Screening:  :     3 most recent PHQ Screens 6/10/2019   Little interest or pleasure in doing things Not at all   Feeling down, depressed, irritable, or hopeless Not at all   Total Score PHQ 2 0       No flowsheet data found. Fall Risk Assessment:  :     Fall Risk Assessment, last 12 mths 6/10/2019   Able to walk? Yes   Fall in past 12 months? No       Abuse Screening:  :     Abuse Screening Questionnaire 5/1/2018 8/7/2017   Do you ever feel afraid of your partner? N N   Are you in a relationship with someone who physically or mentally threatens you? N N   Is it safe for you to go home?  Y Y       ADL Screening:  :     ADL Assessment 2/25/2019   Feeding yourself No Help Needed   Getting from bed to chair No Help Needed   Getting dressed No Help Needed   Bathing or showering No Help Needed   Walk across the room (includes cane/walker) No Help Needed   Using the telphone No Help Needed   Taking your medications No Help Needed   Preparing meals No Help Needed   Managing money (expenses/bills) No Help Needed   Moderately strenuous housework (laundry) No Help Needed   Shopping for personal items (toiletries/medicines) No Help Needed   Shopping for groceries No Help Needed   Driving No Help Needed   Climbing a flight of stairs No Help Needed   Getting to places beyond walking distances No Help Needed

## 2019-08-05 NOTE — PATIENT INSTRUCTIONS
Arthritis: Care Instructions Your Care Instructions Arthritis, also called osteoarthritis, is a breakdown of the cartilage that cushions your joints. When the cartilage wears down, your bones rub against each other. This causes pain and stiffness. Many people have some arthritis as they age. Arthritis most often affects the joints of the spine, hands, hips, knees, or feet. You can take simple measures to protect your joints, ease your pain, and help you stay active. Follow-up care is a key part of your treatment and safety. Be sure to make and go to all appointments, and call your doctor if you are having problems. It's also a good idea to know your test results and keep a list of the medicines you take. How can you care for yourself at home? · Stay at a healthy weight. Being overweight puts extra strain on your joints. · Talk to your doctor or physical therapist about exercises that will help ease joint pain. ? Stretch. You may enjoy gentle forms of yoga to help keep your joints and muscles flexible. ? Walk instead of jog. Other types of exercise that are less stressful on the joints include riding a bicycle, swimming, barbara chi, or water exercise. ? Lift weights. Strong muscles help reduce stress on your joints. Stronger thigh muscles, for example, take some of the stress off of the knees and hips. Learn the right way to lift weights so you do not make joint pain worse. · Take your medicines exactly as prescribed. Call your doctor if you think you are having a problem with your medicine. · Take pain medicines exactly as directed. ? If the doctor gave you a prescription medicine for pain, take it as prescribed. ? If you are not taking a prescription pain medicine, ask your doctor if you can take an over-the-counter medicine. · Use a cane, crutch, walker, or another device if you need help to get around. These can help rest your joints.  You also can use other things to make life easier, such as a higher toilet seat and padded handles on kitchen utensils. · Do not sit in low chairs, which can make it hard to get up. · Put heat or cold on your sore joints as needed. Use whichever helps you most. You also can take turns with hot and cold packs. ? Apply heat 2 or 3 times a day for 20 to 30 minutesusing a heating pad, hot shower, or hot packto relieve pain and stiffness. ? Put ice or a cold pack on your sore joint for 10 to 20 minutes at a time. Put a thin cloth between the ice and your skin. When should you call for help? Call your doctor now or seek immediate medical care if: 
  · You have sudden swelling, warmth, or pain in any joint.  
  · You have joint pain and a fever or rash.  
  · You have such bad pain that you cannot use a joint.  
 Watch closely for changes in your health, and be sure to contact your doctor if: 
  · You have mild joint symptoms that continue even with more than 6 weeks of care at home.  
  · You have stomach pain or other problems with your medicine. Where can you learn more? Go to http://estrella-david.info/. Enter L861 in the search box to learn more about \"Arthritis: Care Instructions. \" Current as of: April 1, 2019 Content Version: 12.1 © 4504-8134 [employer]. Care instructions adapted under license by [employer] (which disclaims liability or warranty for this information). If you have questions about a medical condition or this instruction, always ask your healthcare professional. [place] [address] any warranty or liability for your use of this information.

## 2019-08-06 LAB
A-G RATIO,AGRAT: 1.3 RATIO
ALBUMIN SERPL-MCNC: 4.2 G/DL (ref 3.9–5.4)
ALP SERPL-CCNC: 81 U/L (ref 38–126)
ALT SERPL-CCNC: 29 U/L (ref 0–50)
ANION GAP SERPL CALC-SCNC: 11 MMOL/L
AST SERPL W P-5'-P-CCNC: 36 U/L (ref 14–36)
BILIRUB SERPL-MCNC: 0.9 MG/DL (ref 0.2–1.3)
BUN SERPL-MCNC: 16 MG/DL (ref 9–20)
BUN/CREATININE RATIO,BUCR: 20 RATIO
CALCIUM SERPL-MCNC: 10.4 MG/DL (ref 8.4–10.2)
CHLORIDE SERPL-SCNC: 105 MMOL/L (ref 98–107)
CHOL/HDL RATIO,CHHD: 4 RATIO (ref 0–4)
CHOLEST SERPL-MCNC: 219 MG/DL (ref 0–200)
CK SERPL-CCNC: 74 U/L (ref 30–135)
CO2 SERPL-SCNC: 27 MMOL/L (ref 22–32)
CREAT SERPL-MCNC: 0.8 MG/DL (ref 0.8–1.5)
GLOBULIN,GLOB: 3.3
GLUCOSE SERPL-MCNC: 79 MG/DL (ref 75–110)
HDLC SERPL-MCNC: 60 MG/DL (ref 35–130)
LDL/HDL RATIO,LDHD: 2 RATIO
LDLC SERPL CALC-MCNC: 145 MG/DL (ref 0–130)
POTASSIUM SERPL-SCNC: 5.4 MMOL/L (ref 3.6–5)
PROT SERPL-MCNC: 7.5 G/DL (ref 6.3–8.2)
SODIUM SERPL-SCNC: 143 MMOL/L (ref 137–145)
T4 FREE SERPL-MCNC: 1.19 NG/DL (ref 0.58–2.3)
TRIGL SERPL-MCNC: 71 MG/DL (ref 0–200)
TSH SERPL DL<=0.05 MIU/L-ACNC: 1.34 UIU/ML (ref 0.34–5.6)
VLDLC SERPL CALC-MCNC: 14 MG/DL

## 2019-09-24 PROBLEM — Z00.00 MEDICARE ANNUAL WELLNESS VISIT, SUBSEQUENT: Status: RESOLVED | Noted: 2017-08-07 | Resolved: 2019-09-24

## 2019-09-25 PROBLEM — Z12.11 COLON CANCER SCREENING: Status: RESOLVED | Noted: 2017-08-07 | Resolved: 2019-09-25

## 2019-10-28 RX ORDER — OMEPRAZOLE 20 MG/1
CAPSULE, DELAYED RELEASE ORAL
Qty: 90 CAP | Refills: 3 | Status: SHIPPED | OUTPATIENT
Start: 2019-10-28

## 2019-10-28 NOTE — TELEPHONE ENCOUNTER
Last Refill: 1/28/19  Last visit:8/5/2019  NOV:  11/4/2019      Requested Prescriptions     Pending Prescriptions Disp Refills    omeprazole (PRILOSEC) 20 mg capsule 90 Cap 3     Sig: TAKE ONE CAPSULE BY MOUTH ONCE DAILY

## 2019-10-29 RX ORDER — POLYETHYLENE GLYCOL 3350 17 G/17G
POWDER, FOR SOLUTION ORAL
Qty: 510 G | Refills: 3 | Status: CANCELLED | OUTPATIENT
Start: 2019-10-29

## 2019-10-29 RX ORDER — NICOTINE POLACRILEX 2 MG
LOZENGE BUCCAL
Qty: 850 G | Refills: 5 | Status: SHIPPED | OUTPATIENT
Start: 2019-10-29 | End: 2021-09-20 | Stop reason: SDUPTHER

## 2019-10-29 RX ORDER — POLYETHYLENE GLYCOL 3350 17 G/17G
POWDER, FOR SOLUTION ORAL
Qty: 510 G | Refills: 3 | Status: SHIPPED | OUTPATIENT
Start: 2019-10-29 | End: 2019-11-12 | Stop reason: ALTCHOICE

## 2019-10-29 NOTE — TELEPHONE ENCOUNTER
RX refill request from the patient/pharmacy. Patient last seen 08- with labs, and next appt. scheduled for 11-  Requested Prescriptions     Pending Prescriptions Disp Refills    polyethylene glycol (MIRALAX) 17 gram/dose powder 510 g 3     Sig: MIX AND TAKE 17 GRAMS BY MOUTH DAILY   .

## 2019-11-11 NOTE — PROGRESS NOTES
Chief Complaint   Patient presents with    Hypertension     3 month follow up    Diabetes    Cholesterol Problem       SUBJECTIVE:    Saundra Avila is a [de-identified] y.o. male who returns in follow-up for his hypertension, diabetes, hyperlipidemia, ASCVD, cardiomyopathy, DJD and other medical problems. He is taking his medications and trying to follow his diet and remains physically active. He currently denies any chest pain, shortness of breath, palpitations, PND, orthopnea or other cardiorespiratory complaints. He notes no GI or  complaints. He notes no headaches, dizziness or neurologic complaints. No current arthritic complaints and no other complaints on complete review of systems. Current Outpatient Medications   Medication Sig Dispense Refill    PURELAX 17 gram/dose powder MIX 17G IN WATER AND DRINK DAILY 850 g 5    omeprazole (PRILOSEC) 20 mg capsule TAKE ONE CAPSULE BY MOUTH ONCE DAILY 90 Cap 3    metoprolol succinate (TOPROL-XL) 25 mg XL tablet TAKE 1 TAB BY MOUTH DAILY 90 Tab 3    OTHER       ramipril (ALTACE) 10 mg capsule TAKE TWO CAPSULES BY MOUTH ONCE DAILY 180 Cap 3    Blood-Glucose Meter (TRUE METRIX GLUCOSE METER) misc Use daily as needed for diabetes management. Diagnosis E11.9 1 Each 0    glucose blood VI test strips (TRUE METRIX GLUCOSE TEST STRIP) strip Use 1 to 2 times daily as needed for Diabetes management. Diagnosis is 411.9. 50 Strip 5    glucose blood VI test strips (TRUETEST TEST STRIPS) strip by Does Not Apply route See Admin Instructions. 100 Strip prn    UBIDECARENONE (CO Q-10 PO) Take  by mouth.  OTHER Take 7 Tabs by mouth daily. [facility] FROM [facility]      aspirin 81 mg tablet Take 81 mg by mouth daily.  Indications: taking one a day       Past Medical History:   Diagnosis Date    Anxiety 8/5/2017    Arrhythmia     Arthritis     Atherosclerotic heart disease of native coronary artery without angina pectoris 8/5/2017    Bradycardia 8/5/2017    CAD (coronary artery disease)     Constipation, chronic 8/5/2017    Diabetes ([facility] [address]     diet controlled    Diabetes mellitus ([facility] [address] 8/5/2017    Diverticulosis 8/5/2017    ED (erectile dysfunction) 8/5/2017    GERD (gastroesophageal reflux disease)     Hypertension     Hypogonadism male 8/5/2017    Kidney stone     Neuropathy 8/5/2017    On statin therapy 8/5/2017    Right foot pain 8/5/2017     Past Surgical History:   Procedure Laterality Date    CARDIAC SURG PROCEDURE UNLIST      cabg x4 vessels 1999    COLONOSCOPY N/A 6/21/2016    COLONOSCOPY performed by Ivana Philip MD at [facility] [address]; HI RISK IND  6/21/2016         HX GI      COLONOSCOPY    HX HEART CATHETERIZATION      HX OTHER SURGICAL      artificial testicle    HX PACEMAKER  10/6/15    WA COLONOSCOPY FLX DX W/COLLJ SPEC WHEN PFRMD  4/8/2011         UPPER GI ENDOSCOPY,BIOPSY  12/14/2016          Allergies   Allergen Reactions    Caffeine Unknown (comments)     Sweating AND WOOZY AND CAN'T SLEEP    Codeine Other (comments)     Lightheaded, WOOZY AND CAN'T SLEEP    Percocet [Oxycodone-Acetaminophen] Nausea and Vomiting       REVIEW OF SYSTEMS:  General: negative for - chills or fever, or weight loss or gain  ENT: negative for - headaches, nasal congestion or tinnitus  Eyes: no blurred or visual changes  Neck: No stiffness or swollen nodes  Respiratory: negative for - cough, hemoptysis, shortness of breath or wheezing  Cardiovascular : negative for - chest pain, edema, palpitations or shortness of breath  Gastrointestinal: negative for - abdominal pain, blood in stools, heartburn or nausea/vomiting  Genito-Urinary: no dysuria, trouble voiding, or hematuria  Musculoskeletal: negative for - gait disturbance, joint pain, joint stiffness or joint swelling  Neurological: no TIA or stroke symptoms  Hematologic: no bruises, no bleeding  Lymphatic: no swollen glands  Integument: no lumps, mole changes, nail changes or rash  Endocrine:no malaise/lethargy poly uria or polydipsia or unexpected weight changes        Social History     Socioeconomic History    Marital status:      Spouse name: Not on file    Number of children: Not on file    Years of education: Not on file    Highest education level: Not on file   Tobacco Use    Smoking status: Never Smoker    Smokeless tobacco: Never Used   Substance and Sexual Activity    Alcohol use: No    Drug use: No     Family History   Problem Relation Age of Onset    Heart Disease Mother     No Known Problems Father     Cancer Sister     Diabetes Brother     Anesth Problems Neg Hx        OBJECTIVE:     Visit Vitals  /82 (BP 1 Location: Left arm, BP Patient Position: Sitting)   Pulse 60   Temp 98.1 °F (36.7 °C) (Oral)   Resp 18   Ht 4' 11\" (1.499 m)   Wt 118 lb 3.2 oz (53.6 kg)   SpO2 97%   BMI 23.87 kg/m²     CONSTITUTIONAL:   well nourished, appears age appropriate  EYES: sclera anicteric, PERRL, EOMI  ENMT:nares clear, moist mucous membranes, pharynx clear  NECK: supple. Thyroid normal, No JVD or bruits  RESPIRATORY: Chest: clear to ascultation and percussion, normal inspiratory effort  CARDIOVASCULAR: Heart: regular rate and rhythm no murmurs, rubs or gallops, PMI not displaced, No thrills  GASTROINTESTINAL: Abdomen: non distended, soft, non tender, bowel sounds normal  HEMATOLOGIC: no purpura, petechiae or bruising  LYMPHATIC: No lymph node enlargemant  MUSCULOSKELETAL: Extremities: no edema or active synovitis, pulse 1+   INTEGUMENT: No unusual rashes or suspicious skin lesions noted. Nails appear normal.  PERIPHERAL VASCULAR: normal pulses femoral, PT and DP  NEUROLOGIC: non-focal exam, A & O X 3  PSYCHIATRIC:, appropriate affect     ASSESSMENT:   1. Essential hypertension    2. Controlled type 2 diabetes mellitus with diabetic polyneuropathy, without long-term current use of insulin ([address]    3. Mixed hyperlipidemia    4.  ASCVD (arteriosclerotic cardiovascular disease)    5. Cardiomyopathy, unspecified type ([facility] [address]    6. Primary osteoarthritis involving multiple joints    7. Encounter for immunization      Impression  1. Hypertension that is controlled so continue current therapy reviewed with him. 2.  Diabetes we will see what that status is and make adjustments if necessary. 3.  Hyperlipidemia prior lab reviewed and repeat status pending I will adjust if needed. 4.  ASCVD clinically stable continue aspirin daily  5. Cardiomyopathy clinically stable  6. DJD that is stable  Flu shot given today. I will call with lab results and make further recommendations or adjustments if necessary. Follow-up scheduled again for 3 months or sooner if there is a problem. PLAN:  .  Orders Placed This Encounter    Influenza Vaccine Inactivated (IIV)(FLUAD), Subunit, Adjuvanted, IM, (10333)    METABOLIC PANEL, COMPREHENSIVE ([facility])    LIPID PANEL ([facility])    CK ([facility])    HEMOGLOBIN A1C W/O EAG ([facility])         ATTENTION:   This medical record was transcribed using an electronic medical records system. Although proofread, it may and can contain electronic and spelling errors. Other human spelling and other errors may be present. Corrections may be executed at a later time. Please feel free to contact us for any clarifications as needed. Follow-up and Dispositions    · Return in about 3 months (around 2/12/2020). No results found for any visits on 11/12/19. Lizeth De Oliveira MD    The patient verbalized understanding of the problems and plans as explained.

## 2019-11-12 ENCOUNTER — OFFICE VISIT (OUTPATIENT)
Dept: INTERNAL MEDICINE CLINIC | Age: 79
End: 2019-11-12

## 2019-11-12 VITALS
TEMPERATURE: 98.1 F | BODY MASS INDEX: 23.2 KG/M2 | SYSTOLIC BLOOD PRESSURE: 128 MMHG | DIASTOLIC BLOOD PRESSURE: 82 MMHG | HEIGHT: 60 IN | HEART RATE: 60 BPM | OXYGEN SATURATION: 97 % | WEIGHT: 118.2 LBS | RESPIRATION RATE: 18 BRPM

## 2019-11-12 DIAGNOSIS — E78.2 MIXED HYPERLIPIDEMIA: ICD-10-CM

## 2019-11-12 DIAGNOSIS — Z23 ENCOUNTER FOR IMMUNIZATION: ICD-10-CM

## 2019-11-12 DIAGNOSIS — I10 ESSENTIAL HYPERTENSION: Primary | ICD-10-CM

## 2019-11-12 DIAGNOSIS — M15.9 PRIMARY OSTEOARTHRITIS INVOLVING MULTIPLE JOINTS: ICD-10-CM

## 2019-11-12 DIAGNOSIS — I25.10 ASCVD (ARTERIOSCLEROTIC CARDIOVASCULAR DISEASE): ICD-10-CM

## 2019-11-12 DIAGNOSIS — E11.42 CONTROLLED TYPE 2 DIABETES MELLITUS WITH DIABETIC POLYNEUROPATHY, WITHOUT LONG-TERM CURRENT USE OF INSULIN (HCC): ICD-10-CM

## 2019-11-12 DIAGNOSIS — I42.9 CARDIOMYOPATHY, UNSPECIFIED TYPE (HCC): ICD-10-CM

## 2019-11-12 NOTE — PROGRESS NOTES
Chief Complaint   Patient presents with    Hypertension     3 month follow up    Diabetes    Cholesterol Problem     Visit Vitals  /82 (BP 1 Location: Left arm, BP Patient Position: Sitting)   Pulse 60   Temp 98.1 °F (36.7 °C) (Oral)   Resp 18   Ht 4' 11\" (1.499 m)   Wt 118 lb 3.2 oz (53.6 kg)   SpO2 97%   BMI 23.87 kg/m²     1. Have you been to the ER, urgent care clinic since your last visit? Hospitalized since your last visit? No    2. Have you seen or consulted any other health care providers outside of the [address] [place] [address] since your last visit? Include any pap smears or colon screening.  No

## 2019-11-12 NOTE — PROGRESS NOTES
After obtaining consent and per verbal order from Dr. Tara Le, patient received influenza vaccine given by Jyotsna Dozier and verified by Denise Nash. Fluad Influenza 0.5ml was given IM in left deltoid. Patient tolerated injection and was observed for 10 minutes post injection. VIS was given.

## 2019-11-12 NOTE — PATIENT INSTRUCTIONS
Arthritis: Care Instructions Your Care Instructions Arthritis, also called osteoarthritis, is a breakdown of the cartilage that cushions your joints. When the cartilage wears down, your bones rub against each other. This causes pain and stiffness. Many people have some arthritis as they age. Arthritis most often affects the joints of the spine, hands, hips, knees, or feet. You can take simple measures to protect your joints, ease your pain, and help you stay active. Follow-up care is a key part of your treatment and safety. Be sure to make and go to all appointments, and call your doctor if you are having problems. It's also a good idea to know your test results and keep a list of the medicines you take. How can you care for yourself at home? · Stay at a healthy weight. Being overweight puts extra strain on your joints. · Talk to your doctor or physical therapist about exercises that will help ease joint pain. ? Stretch. You may enjoy gentle forms of yoga to help keep your joints and muscles flexible. ? Walk instead of jog. Other types of exercise that are less stressful on the joints include riding a bicycle, swimming, barbara chi, or water exercise. ? Lift weights. Strong muscles help reduce stress on your joints. Stronger thigh muscles, for example, take some of the stress off of the knees and hips. Learn the right way to lift weights so you do not make joint pain worse. · Take your medicines exactly as prescribed. Call your doctor if you think you are having a problem with your medicine. · Take pain medicines exactly as directed. ? If the doctor gave you a prescription medicine for pain, take it as prescribed. ? If you are not taking a prescription pain medicine, ask your doctor if you can take an over-the-counter medicine. · Use a cane, crutch, walker, or another device if you need help to get around. These can help rest your joints.  You also can use other things to make life easier, such as a higher toilet seat and padded handles on kitchen utensils. · Do not sit in low chairs, which can make it hard to get up. · Put heat or cold on your sore joints as needed. Use whichever helps you most. You also can take turns with hot and cold packs. ? Apply heat 2 or 3 times a day for 20 to 30 minutesusing a heating pad, hot shower, or hot packto relieve pain and stiffness. ? Put ice or a cold pack on your sore joint for 10 to 20 minutes at a time. Put a thin cloth between the ice and your skin. When should you call for help? Call your doctor now or seek immediate medical care if: 
  · You have sudden swelling, warmth, or pain in any joint.  
  · You have joint pain and a fever or rash.  
  · You have such bad pain that you cannot use a joint.  
 Watch closely for changes in your health, and be sure to contact your doctor if: 
  · You have mild joint symptoms that continue even with more than 6 weeks of care at home.  
  · You have stomach pain or other problems with your medicine. Where can you learn more? Go to http://estrella-david.info/. Enter Q032 in the search box to learn more about \"Arthritis: Care Instructions. \" Current as of: April 1, 2019 Content Version: 12.2 © 8368-4153 [employer]. Care instructions adapted under license by [employer] (which disclaims liability or warranty for this information). If you have questions about a medical condition or this instruction, always ask your healthcare professional. [place] [address] any warranty or liability for your use of this information.

## 2019-11-14 ENCOUNTER — APPOINTMENT (OUTPATIENT)
Dept: INTERNAL MEDICINE CLINIC | Age: 79
End: 2019-11-14

## 2019-11-14 LAB
A-G RATIO,AGRAT: 1.3 RATIO
ALBUMIN SERPL-MCNC: 3.9 G/DL (ref 3.9–5.4)
ALP SERPL-CCNC: 73 U/L (ref 38–126)
ALT SERPL-CCNC: 21 U/L (ref 0–50)
ANION GAP SERPL CALC-SCNC: 6 MMOL/L
AST SERPL W P-5'-P-CCNC: 27 U/L (ref 14–36)
BILIRUB SERPL-MCNC: 0.7 MG/DL (ref 0.2–1.3)
BUN SERPL-MCNC: 14 MG/DL (ref 9–20)
BUN/CREATININE RATIO,BUCR: 20 RATIO
CALCIUM SERPL-MCNC: 10 MG/DL (ref 8.4–10.2)
CHLORIDE SERPL-SCNC: 106 MMOL/L (ref 98–107)
CHOL/HDL RATIO,CHHD: 4 RATIO (ref 0–4)
CHOLEST SERPL-MCNC: 207 MG/DL (ref 0–200)
CK SERPL-CCNC: 81 U/L (ref 30–135)
CO2 SERPL-SCNC: 30 MMOL/L (ref 22–32)
CREAT SERPL-MCNC: 0.7 MG/DL (ref 0.8–1.5)
GLOBULIN,GLOB: 3.1
GLUCOSE SERPL-MCNC: 100 MG/DL (ref 75–110)
HDLC SERPL-MCNC: 55 MG/DL (ref 35–130)
LDL/HDL RATIO,LDHD: 3 RATIO
LDLC SERPL CALC-MCNC: 138 MG/DL (ref 0–130)
POTASSIUM SERPL-SCNC: 4.7 MMOL/L (ref 3.6–5)
PROT SERPL-MCNC: 7 G/DL (ref 6.3–8.2)
SODIUM SERPL-SCNC: 142 MMOL/L (ref 137–145)
TRIGL SERPL-MCNC: 71 MG/DL (ref 0–200)
VLDLC SERPL CALC-MCNC: 14 MG/DL

## 2019-11-15 LAB — HBA1C MFR BLD HPLC: 6.2 % (ref 4–5.7)

## 2019-12-11 ENCOUNTER — OFFICE VISIT (OUTPATIENT)
Dept: INTERNAL MEDICINE CLINIC | Age: 79
End: 2019-12-11

## 2019-12-11 VITALS
SYSTOLIC BLOOD PRESSURE: 136 MMHG | DIASTOLIC BLOOD PRESSURE: 72 MMHG | OXYGEN SATURATION: 98 % | RESPIRATION RATE: 19 BRPM | HEART RATE: 60 BPM | TEMPERATURE: 97.9 F | BODY MASS INDEX: 23.4 KG/M2 | HEIGHT: 60 IN | WEIGHT: 119.2 LBS

## 2019-12-11 DIAGNOSIS — M54.2 NECK PAIN: Primary | ICD-10-CM

## 2019-12-11 RX ORDER — PREDNISONE 5 MG/1
TABLET ORAL
Qty: 48 TAB | Refills: 0 | Status: SHIPPED | OUTPATIENT
Start: 2019-12-11 | End: 2020-02-17 | Stop reason: ALTCHOICE

## 2019-12-11 NOTE — PATIENT INSTRUCTIONS
Arthritis: Care Instructions Your Care Instructions Arthritis, also called osteoarthritis, is a breakdown of the cartilage that cushions your joints. When the cartilage wears down, your bones rub against each other. This causes pain and stiffness. Many people have some arthritis as they age. Arthritis most often affects the joints of the spine, hands, hips, knees, or feet. You can take simple measures to protect your joints, ease your pain, and help you stay active. Follow-up care is a key part of your treatment and safety. Be sure to make and go to all appointments, and call your doctor if you are having problems. It's also a good idea to know your test results and keep a list of the medicines you take. How can you care for yourself at home? · Stay at a healthy weight. Being overweight puts extra strain on your joints. · Talk to your doctor or physical therapist about exercises that will help ease joint pain. ? Stretch. You may enjoy gentle forms of yoga to help keep your joints and muscles flexible. ? Walk instead of jog. Other types of exercise that are less stressful on the joints include riding a bicycle, swimming, barbara chi, or water exercise. ? Lift weights. Strong muscles help reduce stress on your joints. Stronger thigh muscles, for example, take some of the stress off of the knees and hips. Learn the right way to lift weights so you do not make joint pain worse. · Take your medicines exactly as prescribed. Call your doctor if you think you are having a problem with your medicine. · Take pain medicines exactly as directed. ? If the doctor gave you a prescription medicine for pain, take it as prescribed. ? If you are not taking a prescription pain medicine, ask your doctor if you can take an over-the-counter medicine. · Use a cane, crutch, walker, or another device if you need help to get around. These can help rest your joints.  You also can use other things to make life easier, such as a higher toilet seat and padded handles on kitchen utensils. · Do not sit in low chairs, which can make it hard to get up. · Put heat or cold on your sore joints as needed. Use whichever helps you most. You also can take turns with hot and cold packs. ? Apply heat 2 or 3 times a day for 20 to 30 minutesusing a heating pad, hot shower, or hot packto relieve pain and stiffness. ? Put ice or a cold pack on your sore joint for 10 to 20 minutes at a time. Put a thin cloth between the ice and your skin. When should you call for help? Call your doctor now or seek immediate medical care if: 
  · You have sudden swelling, warmth, or pain in any joint.  
  · You have joint pain and a fever or rash.  
  · You have such bad pain that you cannot use a joint.  
 Watch closely for changes in your health, and be sure to contact your doctor if: 
  · You have mild joint symptoms that continue even with more than 6 weeks of care at home.  
  · You have stomach pain or other problems with your medicine. Where can you learn more? Go to http://estrella-david.info/. Enter C069 in the search box to learn more about \"Arthritis: Care Instructions. \" Current as of: April 1, 2019 Content Version: 12.2 © 2567-3097 [employer]. Care instructions adapted under license by [employer] (which disclaims liability or warranty for this information). If you have questions about a medical condition or this instruction, always ask your healthcare professional. [place] [address] any warranty or liability for your use of this information.

## 2019-12-11 NOTE — PROGRESS NOTES
Chief Complaint   Patient presents with    Neck Pain     x 1 week     Visit Vitals  /72 (BP 1 Location: Left arm, BP Patient Position: Sitting)   Pulse 60   Temp 97.9 °F (36.6 °C) (Oral)   Resp 19   Ht 4' 11\" (1.499 m)   Wt 119 lb 3.2 oz (54.1 kg)   SpO2 98%   BMI 24.08 kg/m²     1. Have you been to the ER, urgent care clinic since your last visit? Hospitalized since your last visit? No    2. Have you seen or consulted any other health care providers outside of the [address] [place] [address] since your last visit? Include any pap smears or colon screening.  No

## 2019-12-11 NOTE — PROGRESS NOTES
Subjective:   Radha Carreon is a [de-identified] y.o. male      Chief Complaint   Patient presents with    Neck Pain     x 1 week        History of present illness: He presents complaining of pain in his lower neck area when he turns to the left side. This been on for several days and does not seem to be going away. He notes no radiation to the left arm and no numbness or paresthesias. There is no pain in the neck with exertion only when he twists his neck or turns his neck sharply to the left.     Patient Active Problem List   Diagnosis Code    Cardiomyopathy ([facility] I42.9    ASCVD (arteriosclerotic cardiovascular disease) I25.10    Essential hypertension I10    Mixed hyperlipidemia E78.2    Primary osteoarthritis involving multiple joints M15.0    Diverticulosis K57.90    Neuropathy G62.9    Hypogonadism male E29.1    ED (erectile dysfunction) N52.9    Controlled type 2 diabetes mellitus with diabetic polyneuropathy, without long-term current use of insulin (HCC) E11.42    Constipation, chronic K59.09    Bradycardia R00.1    Anxiety F41.9    Epigastric pain R10.13    Pain of right hand M79.641    Neck pain M54.2      Past Medical History:   Diagnosis Date    Anxiety 8/5/2017    Arrhythmia     Arthritis     Atherosclerotic heart disease of native coronary artery without angina pectoris 8/5/2017    Bradycardia 8/5/2017    CAD (coronary artery disease)     Constipation, chronic 8/5/2017    Diabetes ([facility] [address] [facility]     diet controlled    Diabetes mellitus ([facility] [address] 8/5/2017    Diverticulosis 8/5/2017    ED (erectile dysfunction) 8/5/2017    GERD (gastroesophageal reflux disease)     Hypertension     Hypogonadism male 8/5/2017    Kidney stone     Neuropathy 8/5/2017    On statin therapy 8/5/2017    Right foot pain 8/5/2017      Allergies   Allergen Reactions    Caffeine Unknown (comments)     Sweating AND WOOZY AND CAN'T SLEEP    Codeine Other (comments)     Lightheaded, WOOZY AND CAN'T SLEEP    Percocet [Oxycodone-Acetaminophen] Nausea and Vomiting      Family History   Problem Relation Age of Onset    Heart Disease Mother     No Known Problems Father     Cancer Sister     Diabetes Brother     Anesth Problems Neg Hx       Social History     Socioeconomic History    Marital status:      Spouse name: Not on file    Number of children: Not on file    Years of education: Not on file    Highest education level: Not on file   Occupational History    Not on file   Social Needs    Financial resource strain: Not on file    Food insecurity:     Worry: Not on file     Inability: Not on file    Transportation needs:     Medical: Not on file     Non-medical: Not on file   Tobacco Use    Smoking status: Never Smoker    Smokeless tobacco: Never Used   Substance and Sexual Activity    Alcohol use: No    Drug use: No    Sexual activity: Not on file   Lifestyle    Physical activity:     Days per week: Not on file     Minutes per session: Not on file    Stress: Not on file   Relationships    Social connections:     Talks on phone: Not on file     Gets together: Not on file     Attends [employer] service: Not on file     Active member of club or organization: Not on file     Attends meetings of clubs or organizations: Not on file     Relationship status: Not on file    Intimate partner violence:     Fear of current or ex partner: Not on file     Emotionally abused: Not on file     Physically abused: Not on file     Forced sexual activity: Not on file   Other Topics Concern    Not on file   Social History Narrative    Not on file     Prior to Admission medications    Medication Sig Start Date End Date Taking?  Authorizing Provider   predniSONE (STERAPRED) 5 mg dose pack See administration instruction per 5mg dose pack 12/11/19  Yes Rosemary Díaz MD   PURELAX 17 gram/dose powder MIX 17G IN WATER AND DRINK DAILY 10/29/19  Yes Rosemary Díaz MD   omeprazole (PRILOSEC) 20 mg capsule TAKE ONE CAPSULE BY MOUTH ONCE DAILY 10/28/19  Yes Verna Nettles MD   metoprolol succinate (TOPROL-XL) 25 mg XL tablet TAKE 1 TAB BY MOUTH DAILY 4/22/19  Yes Verna Nettles MD   OTHER    Yes Provider, Historical   ramipril (ALTACE) 10 mg capsule TAKE TWO CAPSULES BY MOUTH ONCE DAILY 1/28/19  Yes Verna Nettles MD   Blood-Glucose Meter (TRUE METRIX GLUCOSE METER) misc Use daily as needed for diabetes management. Diagnosis E11.9 11/20/17  Yes Verna Nettles MD   glucose blood VI test strips (TRUE METRIX GLUCOSE TEST STRIP) strip Use 1 to 2 times daily as needed for Diabetes management. Diagnosis is 411.9. 11/20/17  Yes Verna Nettles MD   glucose blood VI test strips (TRUETEST TEST STRIPS) strip by Does Not Apply route See Admin Instructions. 11/20/17  Yes Verna Nettles MD   UBIDECARENONE (CO Q-10 PO) Take  by mouth. Yes Provider, Historical   OTHER Take 7 Tabs by mouth daily. [address]   Yes Provider, Historical   aspirin 81 mg tablet Take 81 mg by mouth daily. Indications: taking one a day 4/7/11  Yes Provider, Historical        Review of Systems              Constitutional:  He denies fever, weight loss, sweats or fatigue. EYES: No blurred or double vision,               ENT: no nasal congestion, no headache or dizziness. No difficulty with               swallowing, taste, speech or smell. Neck: Pain as described above  Respiratory:  No cough, wheezing or shortness of breath. No sputum production. Cardiac:  Denies chest pain, palpitations, unexplained indigestion, syncope, edema, PND or orthopnea. GI:  No changes in bowel movements, no abdominal pain, no bloating, anorexia, nausea, vomiting or heartburn. :  No frequency or dysuria. Denies incontinence or sexual dysfunction. Extremities:  No joint pain, stiffness or swelling  Back:.no pain or soreness  Skin:  No recent rashes or mole changes.   Neurological:  No numbness, tingling, burning paresthesias or loss of motor strength. No syncope, dizziness, frequent headaches or memory loss. Hematologic:  No easy bruising  Lymphatic: No lymph node enlargement    Objective:     Vitals:    12/11/19 1556   BP: 136/72   Pulse: 60   Resp: 19   Temp: 97.9 °F (36.6 °C)   TempSrc: Oral   SpO2: 98%   Weight: 119 lb 3.2 oz (54.1 kg)   Height: 4' 11\" (1.499 m)   PainSc:   7   PainLoc: Neck       Body mass index is 24.08 kg/m². Physical Examination:              General Appearance:  Well-developed, well-nourished, no acute distress. HEENT:      Ears:  The TMs and ear canals were clear. Eyes:  The pupillary responses were normal.  Extraocular muscle function intact. Lids and conjunctiva not injected. Funduscopic exam revealed sharp disc margins. Nares: Clear w/o edema or erythema  Pharynx:  Clear with teeth in good repair. No masses were noted. Neck:  Supple without thyromegaly or adenopathy. No JVD noted. No carotid                bruits. Lungs:  Clear to auscultation and percussion. Cardiac:  Regular rate and rhythm without murmur. PMI not displaced. No gallop, rub or click. Abdominal: Soft, non-tender, no hepata-spleenomegally or masses  Extremities:  No clubbing, cyanosis or edema. Skin:  No rash or unusual mole changes noted. Lymph Nodes:  None felt in the cervical, supraclavicular, axillary or inguinal region. Neurological: . DTRs 2+ and symmetric. Station and gait normal.   Hematologic:   No purpura or petechiae        Assessment/Plan:         1. Neck pain        Impressions/Plan:  Impression  1. Neck pain I think this is all related to DJD cervical spine x-ray does reveal some degenerative changes I will place him on a prednisone 5 mg 12-day Dosepak and recheck if not resolved otherwise per previous schedule.     Orders Placed This Encounter    XR SPINE CERV PA LAT ODONT 3 V MAX    predniSONE (STERAPRED) 5 mg dose pack       Follow-up and Dispositions    · Return At prior appt. No results found for any visits on 12/11/19. Mikki Levi MD    The patient was given after the visit summary the patient verbalized an understanding of the plans and problems as explained.

## 2020-02-01 DIAGNOSIS — I10 ESSENTIAL HYPERTENSION: ICD-10-CM

## 2020-02-01 RX ORDER — RAMIPRIL 10 MG/1
CAPSULE ORAL
Qty: 180 CAP | Refills: 2 | Status: CANCELLED | OUTPATIENT
Start: 2020-02-01

## 2020-02-03 RX ORDER — RAMIPRIL 10 MG/1
CAPSULE ORAL
Qty: 180 CAP | Refills: 3 | Status: SHIPPED | OUTPATIENT
Start: 2020-02-03 | End: 2020-04-30 | Stop reason: SDUPTHER

## 2020-02-03 NOTE — TELEPHONE ENCOUNTER
RX refill request from the patient/pharmacy. Patient last seen 12- with labs, and next appt. scheduled for 02-  Requested Prescriptions     Pending Prescriptions Disp Refills    ramipril (ALTACE) 10 mg capsule [Pharmacy Med Name: RAMIPRIL 10 MG CAPSULE] 180 Cap 3     Sig: TAKE TWO CAPSULES BY MOUTH ONCE DAILY   .

## 2020-02-15 NOTE — PROGRESS NOTES
Chief Complaint   Patient presents with    Cardiomyopathy     3 month f /up    Diabetes    Hypertension       SUBJECTIVE:    Christina Marsh is a [de-identified] y.o. male returns today in follow-up for his medical problems include hypertension, diabetes, hyperlipidemia, ASCVD, cardiomyopathy status post ICD placement, DJD, and other medical problems. He has been intermittently having some chest pain although he is not real particular how he describes it and does not really always note that is related to activity as he sometimes wakes up with it. He notes this more with chest discomfort than a pain. He denies any associated palpitations, shortness of breath, PND, orthopnea or other cardiorespiratory complaints. He has no GI or  complaints including no belching or burping. He has no headaches, dizziness or neurologic complaints. He has no change of his chronic arthritic complaints and there are no other complaints on complete review of systems. Current Outpatient Medications   Medication Sig Dispense Refill    ramipril (ALTACE) 10 mg capsule TAKE TWO CAPSULES BY MOUTH ONCE DAILY 180 Cap 3    PURELAX 17 gram/dose powder MIX 17G IN WATER AND DRINK DAILY 850 g 5    omeprazole (PRILOSEC) 20 mg capsule TAKE ONE CAPSULE BY MOUTH ONCE DAILY (Patient taking differently: as needed. TAKE ONE CAPSULE BY MOUTH ONCE DAILY) 90 Cap 3    metoprolol succinate (TOPROL-XL) 25 mg XL tablet TAKE 1 TAB BY MOUTH DAILY 90 Tab 3    OTHER       Blood-Glucose Meter (TRUE METRIX GLUCOSE METER) misc Use daily as needed for diabetes management. Diagnosis E11.9 1 Each 0    glucose blood VI test strips (TRUE METRIX GLUCOSE TEST STRIP) strip Use 1 to 2 times daily as needed for Diabetes management. Diagnosis is 411.9. 50 Strip 5    glucose blood VI test strips (TRUETEST TEST STRIPS) strip by Does Not Apply route See Admin Instructions. 100 Strip prn    UBIDECARENONE (CO Q-10 PO) Take  by mouth.  OTHER Take 7 Tabs by mouth daily. [facility] [facility] [employer]      aspirin 81 mg tablet Take 81 mg by mouth daily.  Indications: taking one a day       Past Medical History:   Diagnosis Date    Anxiety 8/5/2017    Arrhythmia     Arthritis     Atherosclerotic heart disease of native coronary artery without angina pectoris 8/5/2017    Bradycardia 8/5/2017    CAD (coronary artery disease)     Constipation, chronic 8/5/2017    Diabetes ([address]     diet controlled    Diabetes mellitus ([address] 8/5/2017    Diverticulosis 8/5/2017    ED (erectile dysfunction) 8/5/2017    GERD (gastroesophageal reflux disease)     Hypertension     Hypogonadism male 8/5/2017    Kidney stone     Neuropathy 8/5/2017    On statin therapy 8/5/2017    Right foot pain 8/5/2017     Past Surgical History:   Procedure Laterality Date    CARDIAC SURG PROCEDURE UNLIST      cabg x4 vessels 1999    COLONOSCOPY N/A 6/21/2016    COLONOSCOPY performed by Jorge Ferguson MD at [facility] [address] [facility] [place]; HI RISK IND  6/21/2016         HX GI      COLONOSCOPY    HX HEART CATHETERIZATION      HX OTHER SURGICAL      artificial testicle    HX PACEMAKER  10/6/15    NM COLONOSCOPY FLX DX W/COLLJ SPEC WHEN PFRMD  4/8/2011         UPPER GI ENDOSCOPY,BIOPSY  12/14/2016          Allergies   Allergen Reactions    Caffeine Unknown (comments)     Sweating AND WOOZY AND CAN'T SLEEP    Codeine Other (comments)     Lightheaded, WOOZY AND CAN'T SLEEP    Percocet [Oxycodone-Acetaminophen] Nausea and Vomiting       REVIEW OF SYSTEMS:  General: negative for - chills or fever, or weight loss or gain  ENT: negative for - headaches, nasal congestion or tinnitus  Eyes: no blurred or visual changes  Neck: No stiffness or swollen nodes  Respiratory: negative for - cough, hemoptysis, shortness of breath or wheezing  Cardiovascular : negative for -  edema, palpitations or shortness of breath positive atypical pain as noted  Gastrointestinal: negative for - abdominal pain, blood in stools, heartburn or nausea/vomiting  Genito-Urinary: no dysuria, trouble voiding, or hematuria  Musculoskeletal: negative for - gait disturbance, joint pain, joint stiffness or joint swelling  Neurological: no TIA or stroke symptoms  Hematologic: no bruises, no bleeding  Lymphatic: no swollen glands  Integument: no lumps, mole changes, nail changes or rash  Endocrine:no malaise/lethargy poly uria or polydipsia or unexpected weight changes        Social History     Socioeconomic History    Marital status:      Spouse name: Not on file    Number of children: Not on file    Years of education: Not on file    Highest education level: Not on file   Tobacco Use    Smoking status: Never Smoker    Smokeless tobacco: Never Used   Substance and Sexual Activity    Alcohol use: No    Drug use: No     Family History   Problem Relation Age of Onset    Heart Disease Mother     No Known Problems Father     Cancer Sister     Diabetes Brother     Anesth Problems Neg Hx        OBJECTIVE:     Visit Vitals  /84   Pulse 60   Temp 97.9 °F (36.6 °C)   Resp 16   Ht 4' 11\" (1.499 m)   Wt 117 lb 12.8 oz (53.4 kg)   SpO2 96%   BMI 23.79 kg/m²     CONSTITUTIONAL:   well nourished, appears age appropriate  EYES: sclera anicteric, PERRL, EOMI  ENMT:nares clear, moist mucous membranes, pharynx clear  NECK: supple. Thyroid normal, No JVD or bruits  RESPIRATORY: Chest: clear to ascultation and percussion, normal inspiratory effort  CARDIOVASCULAR: Heart: regular rate and rhythm no murmurs, rubs or gallops, PMI not displaced, No thrills  GASTROINTESTINAL: Abdomen: non distended, soft, non tender, bowel sounds normal  HEMATOLOGIC: no purpura, petechiae or bruising  LYMPHATIC: No lymph node enlargemant  MUSCULOSKELETAL: Extremities: no edema or active synovitis, pulse 1+. No diabetic foot changes noted   INTEGUMENT: No unusual rashes or suspicious skin lesions noted.  Nails appear normal.  PERIPHERAL VASCULAR: normal pulses femoral, PT and DP  NEUROLOGIC: non-focal exam, A & O X 3. Normal distal sensation and proprioception all toes both feet. PSYCHIATRIC:, appropriate affect     ASSESSMENT:   1. Essential hypertension    2. Controlled type 2 diabetes mellitus with diabetic polyneuropathy, without long-term current use of insulin ([address]    3. Mixed hyperlipidemia    4. Primary osteoarthritis involving multiple joints    5. Cardiomyopathy, unspecified type ([address]    6. ASCVD (arteriosclerotic cardiovascular disease)    7. Anxiety    8. Chest pain, unspecified type      Pression  1. Hypertension that is very well controlled  2. Diabetes mellitus repeat status pending and I will make adjustments if necessary. 3.  Hyperlipidemia prior lab reviewed and repeat status pending and I will adjust if needed. 4. DJD that is stable  5. Cardiomyopathy no clinical evidence of heart failure and ICD in  6. ASCVD with a evaluation for chest pain I am little concerned that this could be related to angina. I have reviewed the chart and do not see when his last catheterization or stress test was done. EKG obtained today is a paced rhythm and no acute process. I am in a setting of a stress Cardiolite  7. Anxiety that is stable  8. Chest pain as described above  High complexity decision making on this patient today with multiple risk factors for cardiac disease and known cardiac disease based upon that I am going to set him up with a stress test and recheck him after that. Follow-up in 3 months regarding his other medical problems. I will call with lab results. High complexity decision making on this high complexity patient today.     PLAN:  .  Orders Placed This Encounter    METABOLIC PANEL, COMPREHENSIVE ([facility])    LIPID PANEL ([facility])    HEMOGLOBIN A1C W/O EAG ([facility])    [facility] Int Card Ref [facility]    AMB POC EKG ROUTINE W/ 12 LEADS, INTER & REP         ATTENTION:   This medical record was transcribed using an electronic medical records system. Although proofread, it may and can contain electronic and spelling errors. Other human spelling and other errors may be present. Corrections may be executed at a later time. Please feel free to contact us for any clarifications as needed. No results found for any visits on 02/17/20. Juan Rodriguez MD    The patient verbalized understanding of the problems and plans as explained.

## 2020-02-17 ENCOUNTER — OFFICE VISIT (OUTPATIENT)
Dept: INTERNAL MEDICINE CLINIC | Age: 80
End: 2020-02-17

## 2020-02-17 VITALS
SYSTOLIC BLOOD PRESSURE: 126 MMHG | HEIGHT: 60 IN | RESPIRATION RATE: 16 BRPM | WEIGHT: 117.8 LBS | OXYGEN SATURATION: 96 % | HEART RATE: 60 BPM | TEMPERATURE: 97.9 F | BODY MASS INDEX: 23.13 KG/M2 | DIASTOLIC BLOOD PRESSURE: 84 MMHG

## 2020-02-17 DIAGNOSIS — E78.2 MIXED HYPERLIPIDEMIA: ICD-10-CM

## 2020-02-17 DIAGNOSIS — I42.9 CARDIOMYOPATHY, UNSPECIFIED TYPE (HCC): ICD-10-CM

## 2020-02-17 DIAGNOSIS — I10 ESSENTIAL HYPERTENSION: Primary | ICD-10-CM

## 2020-02-17 DIAGNOSIS — E11.42 CONTROLLED TYPE 2 DIABETES MELLITUS WITH DIABETIC POLYNEUROPATHY, WITHOUT LONG-TERM CURRENT USE OF INSULIN (HCC): ICD-10-CM

## 2020-02-17 DIAGNOSIS — M15.9 PRIMARY OSTEOARTHRITIS INVOLVING MULTIPLE JOINTS: ICD-10-CM

## 2020-02-17 DIAGNOSIS — R07.9 CHEST PAIN, UNSPECIFIED TYPE: ICD-10-CM

## 2020-02-17 DIAGNOSIS — I25.10 ASCVD (ARTERIOSCLEROTIC CARDIOVASCULAR DISEASE): ICD-10-CM

## 2020-02-17 DIAGNOSIS — F41.9 ANXIETY: ICD-10-CM

## 2020-02-17 LAB
A-G RATIO,AGRAT: 1.3 RATIO
ALBUMIN SERPL-MCNC: 4 G/DL (ref 3.9–5.4)
ALP SERPL-CCNC: 73 U/L (ref 38–126)
ALT SERPL-CCNC: 26 U/L (ref 0–50)
ANION GAP SERPL CALC-SCNC: 10 MMOL/L
AST SERPL W P-5'-P-CCNC: 33 U/L (ref 14–36)
BILIRUB SERPL-MCNC: 0.6 MG/DL (ref 0.2–1.3)
BUN SERPL-MCNC: 17 MG/DL (ref 9–20)
BUN/CREATININE RATIO,BUCR: 28 RATIO
CALCIUM SERPL-MCNC: 10.2 MG/DL (ref 8.4–10.2)
CHLORIDE SERPL-SCNC: 104 MMOL/L (ref 98–107)
CHOL/HDL RATIO,CHHD: 4 RATIO (ref 0–4)
CHOLEST SERPL-MCNC: 212 MG/DL (ref 0–200)
CO2 SERPL-SCNC: 29 MMOL/L (ref 22–32)
CREAT SERPL-MCNC: 0.6 MG/DL (ref 0.8–1.5)
GLOBULIN,GLOB: 3
GLUCOSE SERPL-MCNC: 128 MG/DL (ref 75–110)
HBA1C MFR BLD HPLC: 6.4 % (ref 4–5.7)
HDLC SERPL-MCNC: 60 MG/DL (ref 35–130)
LDL/HDL RATIO,LDHD: 2 RATIO
LDLC SERPL CALC-MCNC: 140 MG/DL (ref 0–130)
POTASSIUM SERPL-SCNC: 4.3 MMOL/L (ref 3.6–5)
PROT SERPL-MCNC: 7 G/DL (ref 6.3–8.2)
SODIUM SERPL-SCNC: 143 MMOL/L (ref 137–145)
TRIGL SERPL-MCNC: 59 MG/DL (ref 0–200)
VLDLC SERPL CALC-MCNC: 12 MG/DL

## 2020-02-17 NOTE — PATIENT INSTRUCTIONS

## 2020-02-17 NOTE — PROGRESS NOTES
Chief Complaint   Patient presents with    Cardiomyopathy     3 month f /up    Diabetes    Hypertension     1. Have you been to the ER, urgent care clinic since your last visit? Hospitalized since your last visit? No    2. Have you seen or consulted any other health care providers outside of the [facility] since your last visit? Include any pap smears or colon screening.  No

## 2020-02-18 NOTE — PROGRESS NOTES
Believe it or not blood sugar is actually up a little bit. Glycohemoglobin is upper normal so start metformin 500 mg daily. Lipid profile is okay because of an excellent HDL.

## 2020-02-20 RX ORDER — METFORMIN HYDROCHLORIDE 500 MG/1
500 TABLET ORAL 2 TIMES DAILY WITH MEALS
Qty: 90 TAB | Refills: 3 | Status: SHIPPED | OUTPATIENT
Start: 2020-02-20 | End: 2020-08-10

## 2020-02-20 NOTE — PROGRESS NOTES
Believe it or not blood sugar is actually up a little bit. Glycohemoglobin is upper normal so start metformin 500 mg daily. Lipid profile is okay because of an excellent HDL. Discussed with patient. Rx sent to patient's pharmacy per his order.

## 2020-02-20 NOTE — TELEPHONE ENCOUNTER
RX refill request from the patient/pharmacy. Patient last seen 02- with labs, and next appt. scheduled for 06-. Requested Prescriptions     Pending Prescriptions Disp Refills    metFORMIN (GLUCOPHAGE) 500 mg tablet 90 Tab 3     Sig: Take 1 Tab by mouth two (2) times daily (with meals).    Roseannvan Bai

## 2020-04-30 DIAGNOSIS — I10 ESSENTIAL HYPERTENSION: ICD-10-CM

## 2020-04-30 RX ORDER — METOPROLOL SUCCINATE 25 MG/1
TABLET, EXTENDED RELEASE ORAL
Qty: 90 TAB | Refills: 3 | Status: SHIPPED | OUTPATIENT
Start: 2020-04-30 | End: 2021-05-03

## 2020-04-30 RX ORDER — RAMIPRIL 10 MG/1
CAPSULE ORAL
Qty: 180 CAP | Refills: 3 | Status: SHIPPED | OUTPATIENT
Start: 2020-04-30 | End: 2021-05-03

## 2020-04-30 NOTE — TELEPHONE ENCOUNTER
PCP: Gaurav Albert MD    Last appt: 2/17/2020  Future Appointments   Date Time Provider Walker Ding   6/1/2020  9:00 AM Gaurav Albert MD [facility] MAIKEL SCHED       Requested Prescriptions     Pending Prescriptions Disp Refills    metoprolol succinate (TOPROL-XL) 25 mg XL tablet 90 Tab 3     Sig: TAKE 1 TAB BY MOUTH DAILY    ramipriL (ALTACE) 10 mg capsule 180 Cap 3

## 2020-05-29 NOTE — PROGRESS NOTES
Chief Complaint   Patient presents with    Hypertension     3 month follow up    Diabetes    Cholesterol Problem       SUBJECTIVE:    Sarah Nguyen is a [de-identified] y.o. male who returns in follow-up of his medical problems include hypertension, diabetes, hyperlipidemia, cardiomyopathy, ASCVD, diverticulosis, DJD and other multiple medical problems. He is taking his medications and trying to follow his diet and try and remain physically active. He currently denies any chest pain, shortness of breath, palpitations, PND, orthopnea or other cardiorespiratory complaints. He notes no GI or  complaints. He notes no headaches, dizziness or neurologic complaints. He has no change of his chronic arthritic complaints and there are no other complaints on complete review of systems. Current Outpatient Medications   Medication Sig Dispense Refill    metoprolol succinate (TOPROL-XL) 25 mg XL tablet TAKE 1 TAB BY MOUTH DAILY 90 Tab 3    ramipriL (ALTACE) 10 mg capsule Take 2 tablets daily 180 Cap 3    metFORMIN (GLUCOPHAGE) 500 mg tablet Take 1 Tab by mouth two (2) times daily (with meals). 90 Tab 3    PURELAX 17 gram/dose powder MIX 17G IN WATER AND DRINK DAILY 850 g 5    omeprazole (PRILOSEC) 20 mg capsule TAKE ONE CAPSULE BY MOUTH ONCE DAILY (Patient taking differently: as needed. TAKE ONE CAPSULE BY MOUTH ONCE DAILY) 90 Cap 3    OTHER       Blood-Glucose Meter (TRUE METRIX GLUCOSE METER) misc Use daily as needed for diabetes management. Diagnosis E11.9 1 Each 0    glucose blood VI test strips (TRUE METRIX GLUCOSE TEST STRIP) strip Use 1 to 2 times daily as needed for Diabetes management. Diagnosis is 411.9. 50 Strip 5    glucose blood VI test strips (TRUETEST TEST STRIPS) strip by Does Not Apply route See Admin Instructions. 100 Strip prn    UBIDECARENONE (CO Q-10 PO) Take  by mouth.  OTHER Take 7 Tabs by mouth daily. [facility]      aspirin 81 mg tablet Take 81 mg by mouth daily. Indications: taking one a day       Past Medical History:   Diagnosis Date    Anxiety 8/5/2017    Arrhythmia     Arthritis     Atherosclerotic heart disease of native coronary artery without angina pectoris 8/5/2017    Bradycardia 8/5/2017    CAD (coronary artery disease)     Constipation, chronic 8/5/2017    Diabetes ([facility] [address]     diet controlled    Diabetes mellitus ([facility] [address] 8/5/2017    Diverticulosis 8/5/2017    ED (erectile dysfunction) 8/5/2017    GERD (gastroesophageal reflux disease)     Hypertension     Hypogonadism male 8/5/2017    Kidney stone     Neuropathy 8/5/2017    On statin therapy 8/5/2017    Right foot pain 8/5/2017     Past Surgical History:   Procedure Laterality Date    CARDIAC SURG PROCEDURE UNLIST      cabg x4 vessels 1999    COLONOSCOPY N/A 6/21/2016    COLONOSCOPY performed by Albertina Ford MD at [facility]; HI RISK IND  6/21/2016         HX GI      COLONOSCOPY    HX HEART CATHETERIZATION      HX OTHER SURGICAL      artificial testicle    HX PACEMAKER  10/6/15    ND COLONOSCOPY FLX DX W/COLLJ SPEC WHEN PFRMD  4/8/2011         UPPER GI ENDOSCOPY,BIOPSY  12/14/2016          Allergies   Allergen Reactions    Caffeine Unknown (comments)     Sweating AND WOOZY AND CAN'T SLEEP    Codeine Other (comments)     Lightheaded, WOOZY AND CAN'T SLEEP    Percocet [Oxycodone-Acetaminophen] Nausea and Vomiting       REVIEW OF SYSTEMS:  General: negative for - chills or fever, or weight loss or gain  ENT: negative for - headaches, nasal congestion or tinnitus  Eyes: no blurred or visual changes  Neck: No stiffness or swollen nodes  Respiratory: negative for - cough, hemoptysis, shortness of breath or wheezing  Cardiovascular : negative for - chest pain, edema, palpitations or shortness of breath  Gastrointestinal: negative for - abdominal pain, blood in stools, heartburn or nausea/vomiting  Genito-Urinary: no dysuria, trouble voiding, or hematuria  Musculoskeletal: negative for - gait disturbance, joint pain, joint stiffness or joint swelling  Neurological: no TIA or stroke symptoms  Hematologic: no bruises, no bleeding  Lymphatic: no swollen glands  Integument: no lumps, mole changes, nail changes or rash  Endocrine:no malaise/lethargy poly uria or polydipsia or unexpected weight changes        Social History     Socioeconomic History    Marital status:      Spouse name: Not on file    Number of children: Not on file    Years of education: Not on file    Highest education level: Not on file   Tobacco Use    Smoking status: Never Smoker    Smokeless tobacco: Never Used   Substance and Sexual Activity    Alcohol use: No    Drug use: No     Family History   Problem Relation Age of Onset    Heart Disease Mother     No Known Problems Father     Cancer Sister     Diabetes Brother     Anesth Problems Neg Hx        OBJECTIVE:     Visit Vitals  /86   Pulse 60   Temp 97.8 °F (36.6 °C) (Oral)   Resp 18   Ht 4' 11\" (1.499 m)   Wt 116 lb (52.6 kg)   SpO2 94%   BMI 23.43 kg/m²     CONSTITUTIONAL:   well nourished, appears age appropriate  EYES: sclera anicteric, PERRL, EOMI  ENMT:nares clear, moist mucous membranes, pharynx clear  NECK: supple. Thyroid normal, No JVD or bruits  RESPIRATORY: Chest: clear to ascultation and percussion, normal inspiratory effort  CARDIOVASCULAR: Heart: regular rate and rhythm no murmurs, rubs or gallops, PMI not displaced, No thrills, no peripheral edema  GASTROINTESTINAL: Abdomen: non distended, soft, non tender, bowel sounds normal  HEMATOLOGIC: no purpura, petechiae or bruising  LYMPHATIC: No lymph node enlargemant  MUSCULOSKELETAL: Extremities: no active synovitis, pulse 1+   INTEGUMENT: No unusual rashes or suspicious skin lesions noted.  Nails appear normal.  PERIPHERAL VASCULAR: normal pulses femoral, PT and DP  NEUROLOGIC: non-focal exam, A & O X 3  PSYCHIATRIC:, appropriate affect     ASSESSMENT: 1. Essential hypertension    2. Controlled type 2 diabetes mellitus with diabetic polyneuropathy, without long-term current use of insulin ([facility] [address]    3. Mixed hyperlipidemia    4. Cardiomyopathy, unspecified type ([facility] [address]    5. ASCVD (arteriosclerotic cardiovascular disease)    6. Primary osteoarthritis involving multiple joints      Impression  1. Hypertension is controlled so continue current therapy reviewed with him. 2.  Diabetes repeat status pending a prior lab review not make adjustments if necessary. 3.  Hyperlipidemia prior lab reviewed and repeat status pending and I will adjust if needed. 4.  Cardiomyopathy clinically stable  5. ASCVD clinically stable continue aspirin daily  6. DJD that is stable  I will call with lab results and make further recommendations or adjustments if necessary. Follow-up in 3 months or sooner if there is a problem. PLAN:  .  Orders Placed This Encounter    METABOLIC PANEL, COMPREHENSIVE ([facility])    LIPID PANEL ([facility])    CK ([facility])    HEMOGLOBIN A1C W/O EAG [facility]         ATTENTION:   This medical record was transcribed using an electronic medical records system. Although proofread, it may and can contain electronic and spelling errors. Other human spelling and other errors may be present. Corrections may be executed at a later time. Please feel free to contact us for any clarifications as needed. Follow-up and Dispositions    · Return in about 3 months (around 9/1/2020). No results found for any visits on 06/01/20. Bob Aleman MD    The patient verbalized understanding of the problems and plans as explained.

## 2020-06-01 ENCOUNTER — OFFICE VISIT (OUTPATIENT)
Dept: INTERNAL MEDICINE CLINIC | Age: 80
End: 2020-06-01

## 2020-06-01 VITALS
SYSTOLIC BLOOD PRESSURE: 134 MMHG | WEIGHT: 116 LBS | RESPIRATION RATE: 18 BRPM | BODY MASS INDEX: 22.78 KG/M2 | HEIGHT: 60 IN | HEART RATE: 60 BPM | OXYGEN SATURATION: 94 % | TEMPERATURE: 97.8 F | DIASTOLIC BLOOD PRESSURE: 86 MMHG

## 2020-06-01 DIAGNOSIS — M15.9 PRIMARY OSTEOARTHRITIS INVOLVING MULTIPLE JOINTS: ICD-10-CM

## 2020-06-01 DIAGNOSIS — I10 ESSENTIAL HYPERTENSION: Primary | ICD-10-CM

## 2020-06-01 DIAGNOSIS — E11.42 CONTROLLED TYPE 2 DIABETES MELLITUS WITH DIABETIC POLYNEUROPATHY, WITHOUT LONG-TERM CURRENT USE OF INSULIN (HCC): ICD-10-CM

## 2020-06-01 DIAGNOSIS — I25.10 ASCVD (ARTERIOSCLEROTIC CARDIOVASCULAR DISEASE): ICD-10-CM

## 2020-06-01 DIAGNOSIS — E78.2 MIXED HYPERLIPIDEMIA: ICD-10-CM

## 2020-06-01 DIAGNOSIS — I42.9 CARDIOMYOPATHY, UNSPECIFIED TYPE (HCC): ICD-10-CM

## 2020-06-01 LAB
A-G RATIO,AGRAT: 1.2 RATIO
ALBUMIN SERPL-MCNC: 4.1 G/DL (ref 3.9–5.4)
ALP SERPL-CCNC: 66 U/L (ref 38–126)
ALT SERPL-CCNC: 15 U/L (ref 0–50)
ANION GAP SERPL CALC-SCNC: 8 MMOL/L
AST SERPL W P-5'-P-CCNC: 26 U/L (ref 14–36)
BILIRUB SERPL-MCNC: 0.9 MG/DL (ref 0.2–1.3)
BUN SERPL-MCNC: 18 MG/DL (ref 9–20)
BUN/CREATININE RATIO,BUCR: 26 RATIO
CALCIUM SERPL-MCNC: 10.2 MG/DL (ref 8.4–10.2)
CHLORIDE SERPL-SCNC: 104 MMOL/L (ref 98–107)
CHOL/HDL RATIO,CHHD: 4 RATIO (ref 0–4)
CHOLEST SERPL-MCNC: 239 MG/DL (ref 0–200)
CK SERPL-CCNC: 87 U/L (ref 30–135)
CO2 SERPL-SCNC: 31 MMOL/L (ref 22–32)
CREAT SERPL-MCNC: 0.7 MG/DL (ref 0.8–1.5)
GLOBULIN,GLOB: 3.3
GLUCOSE SERPL-MCNC: 61 MG/DL (ref 75–110)
HBA1C MFR BLD HPLC: 5.7 % (ref 4–5.7)
HDLC SERPL-MCNC: 60 MG/DL (ref 35–130)
LDL/HDL RATIO,LDHD: 3 RATIO
LDLC SERPL CALC-MCNC: 163 MG/DL (ref 0–130)
POTASSIUM SERPL-SCNC: 5.2 MMOL/L (ref 3.6–5)
PROT SERPL-MCNC: 7.4 G/DL (ref 6.3–8.2)
SODIUM SERPL-SCNC: 143 MMOL/L (ref 137–145)
TRIGL SERPL-MCNC: 82 MG/DL (ref 0–200)
VLDLC SERPL CALC-MCNC: 16 MG/DL

## 2020-06-01 NOTE — PROGRESS NOTES
Labs are okay with glycohemoglobin excellent. Excellent HDL although the LDL is up a little so watch diet.

## 2020-06-01 NOTE — PROGRESS NOTES
Chief Complaint   Patient presents with    Hypertension     3 month follow up    Diabetes    Cholesterol Problem     Visit Vitals  BP (!) 166/100 (BP 1 Location: Left arm, BP Patient Position: Sitting)   Pulse 60   Temp 97.8 °F (36.6 °C) (Oral)   Resp 18   Ht 4' 11\" (1.499 m)   Wt 116 lb (52.6 kg)   SpO2 94%   BMI 23.43 kg/m²     1. Have you been to the ER, urgent care clinic since your last visit? Hospitalized since your last visit? No    2. Have you seen or consulted any other health care providers outside of the [address] [place] [address] since your last visit? Include any pap smears or colon screening.  No

## 2020-06-01 NOTE — PATIENT INSTRUCTIONS
Back Pain: Care Instructions Your Care Instructions Back pain has many possible causes. It is often related to problems with muscles and ligaments of the back. It may also be related to problems with the nerves, discs, or bones of the back. Moving, lifting, standing, sitting, or sleeping in an awkward way can strain the back. Sometimes you don't notice the injury until later. Arthritis is another common cause of back pain. Although it may hurt a lot, back pain usually improves on its own within several weeks. Most people recover in 12 weeks or less. Using good home treatment and being careful not to stress your back can help you feel better sooner. Follow-up care is a key part of your treatment and safety. Be sure to make and go to all appointments, and call your doctor if you are having problems. It's also a good idea to know your test results and keep a list of the medicines you take. How can you care for yourself at home? · Sit or lie in positions that are most comfortable and reduce your pain. Try one of these positions when you lie down: ? Lie on your back with your knees bent and supported by large pillows. ? Lie on the floor with your legs on the seat of a sofa or chair. ? Lie on your side with your knees and hips bent and a pillow between your legs. ? Lie on your stomach if it does not make pain worse. · Do not sit up in bed, and avoid soft couches and twisted positions. Bed rest can help relieve pain at first, but it delays healing. Avoid bed rest after the first day of back pain. · Change positions every 30 minutes. If you must sit for long periods of time, take breaks from sitting. Get up and walk around, or lie in a comfortable position. · Try using a heating pad on a low or medium setting for 15 to 20 minutes every 2 or 3 hours. Try a warm shower in place of one session with the heating pad. · You can also try an ice pack for 10 to 15 minutes every 2 to 3 hours. Put a thin cloth between the ice pack and your skin. · Take pain medicines exactly as directed. ? If the doctor gave you a prescription medicine for pain, take it as prescribed. ? If you are not taking a prescription pain medicine, ask your doctor if you can take an over-the-counter medicine. · Take short walks several times a day. You can start with 5 to 10 minutes, 3 or 4 times a day, and work up to longer walks. Walk on level surfaces and avoid hills and stairs until your back is better. · Return to work and other activities as soon as you can. Continued rest without activity is usually not good for your back. · To prevent future back pain, do exercises to stretch and strengthen your back and stomach. Learn how to use good posture, safe lifting techniques, and proper body mechanics. When should you call for help? Call your doctor now or seek immediate medical care if: 
· You have new or worsening numbness in your legs. · You have new or worsening weakness in your legs. (This could make it hard to stand up.) · You lose control of your bladder or bowels. Watch closely for changes in your health, and be sure to contact your doctor if: 
· You have a fever, lose weight, or don't feel well. · You do not get better as expected. Where can you learn more? Go to http://estrella-david.info/ Enter V613 in the search box to learn more about \"Back Pain: Care Instructions. \" Current as of: March 2, 2020               Content Version: 12.5 © 4306-1418 [employer]. Care instructions adapted under license by [employer] (which disclaims liability or warranty for this information). If you have questions about a medical condition or this instruction, always ask your healthcare professional. [place] [address] any warranty or liability for your use of this information.

## 2020-06-02 NOTE — PROGRESS NOTES
Labs are okay with glycohemoglobin excellent.  Excellent HDL although the LDL is up a little so watch diet. Letter generated to patient regarding.

## 2020-07-02 ENCOUNTER — OFFICE VISIT (OUTPATIENT)
Dept: INTERNAL MEDICINE CLINIC | Age: 80
End: 2020-07-02

## 2020-07-02 VITALS
TEMPERATURE: 98.7 F | DIASTOLIC BLOOD PRESSURE: 84 MMHG | WEIGHT: 114.5 LBS | SYSTOLIC BLOOD PRESSURE: 136 MMHG | BODY MASS INDEX: 22.48 KG/M2 | HEART RATE: 60 BPM | HEIGHT: 60 IN

## 2020-07-02 DIAGNOSIS — E11.42 CONTROLLED TYPE 2 DIABETES MELLITUS WITH DIABETIC POLYNEUROPATHY, WITHOUT LONG-TERM CURRENT USE OF INSULIN (HCC): ICD-10-CM

## 2020-07-02 DIAGNOSIS — I10 ESSENTIAL HYPERTENSION: ICD-10-CM

## 2020-07-02 DIAGNOSIS — H11.31 SUBCONJUNCTIVAL HEMORRHAGE OF RIGHT EYE: Primary | ICD-10-CM

## 2020-07-02 NOTE — PROGRESS NOTES
Chief Complaint   Patient presents with    Eye Problem     right eye redness and irritated started this morning       1. Have you been to the ER, urgent care clinic since your last visit? Hospitalized since your last visit? No    2. Have you seen or consulted any other health care providers outside of the [address] [place] [address] since your last visit? Include any pap smears or colon screening.  No

## 2020-07-02 NOTE — PATIENT INSTRUCTIONS

## 2020-07-02 NOTE — PROGRESS NOTES
Subjective:   Brittney Patel is a 79 y.o. male      Chief Complaint   Patient presents with    Eye Problem     right eye redness and irritated started this morning    Side Pain     Left side pain/ stiffness        History of present illness: He presents today for evaluation of redness of his right eye that seems to be irritated starting this morning. He may have noted a little bit of it yesterday. He notes no history of falls or injury. He has no pain. There is no drainage no crusting of the eye. There is no visual impairment. He did have some left side pain and stiffness but that has resolved. He has no bowel or bladder dysfunction. He currently denies any cardiorespiratory complaints. He has no other current complaints.     Patient Active Problem List   Diagnosis Code    Cardiomyopathy ([facility] [address] I42.9    ASCVD (arteriosclerotic cardiovascular disease) I25.10    Essential hypertension I10    Mixed hyperlipidemia E78.2    Primary osteoarthritis involving multiple joints M89.49    Diverticulosis K57.90    Neuropathy G62.9    Hypogonadism male E29.1    ED (erectile dysfunction) N52.9    Controlled type 2 diabetes mellitus with diabetic polyneuropathy, without long-term current use of insulin ([facility] E11.42    Constipation, chronic K59.09    Bradycardia R00.1    Anxiety F41.9    Epigastric pain R10.13    Pain of right hand M79.641    Neck pain M54.2    Chest pain R07.9    Subconjunctival hemorrhage of right eye H11.31      Past Medical History:   Diagnosis Date    Anxiety 8/5/2017    Arrhythmia     Arthritis     Atherosclerotic heart disease of native coronary artery without angina pectoris 8/5/2017    Bradycardia 8/5/2017    CAD (coronary artery disease)     Constipation, chronic 8/5/2017    Diabetes ([facility] [address]     diet controlled    Diabetes mellitus ([address] 8/5/2017    Diverticulosis 8/5/2017    ED (erectile dysfunction) 8/5/2017    GERD (gastroesophageal reflux disease)     Hypertension  Hypogonadism male 8/5/2017    Kidney stone     Neuropathy 8/5/2017    On statin therapy 8/5/2017    Right foot pain 8/5/2017      Allergies   Allergen Reactions    Caffeine Unknown (comments)     Sweating AND WOOZY AND CAN'T SLEEP    Codeine Other (comments)     Lightheaded, WOOZY AND CAN'T SLEEP    Percocet [Oxycodone-Acetaminophen] Nausea and Vomiting      Family History   Problem Relation Age of Onset    Heart Disease Mother     No Known Problems Father     Cancer Sister     Diabetes Brother     Anesth Problems Neg Hx       Social History     Socioeconomic History    Marital status:      Spouse name: Not on file    Number of children: Not on file    Years of education: Not on file    Highest education level: Not on file   Occupational History    Not on file   Social Needs    Financial resource strain: Not on file    Food insecurity     Worry: Not on file     Inability: Not on file    Transportation needs     Medical: Not on file     Non-medical: Not on file   Tobacco Use    Smoking status: Never Smoker    Smokeless tobacco: Never Used   Substance and Sexual Activity    Alcohol use: No    Drug use: No    Sexual activity: Not on file   Lifestyle    Physical activity     Days per week: Not on file     Minutes per session: Not on file    Stress: Not on file   Relationships    Social connections     Talks on phone: Not on file     Gets together: Not on file     Attends [employer] service: Not on file     Active member of club or organization: Not on file     Attends meetings of clubs or organizations: Not on file     Relationship status: Not on file    Intimate partner violence     Fear of current or ex partner: Not on file     Emotionally abused: Not on file     Physically abused: Not on file     Forced sexual activity: Not on file   Other Topics Concern    Not on file   Social History Narrative    Not on file     Prior to Admission medications    Medication Sig Start Date End Date Taking? Authorizing Provider   metoprolol succinate (TOPROL-XL) 25 mg XL tablet TAKE 1 TAB BY MOUTH DAILY 4/30/20  Yes Wanda Ramsay MD   ramipriL (ALTACE) 10 mg capsule Take 2 tablets daily 4/30/20  Yes Wanda Ramsay MD   metFORMIN (GLUCOPHAGE) 500 mg tablet Take 1 Tab by mouth two (2) times daily (with meals). 2/20/20  Yes Wanda Ramsay MD   PURELAX 17 gram/dose powder MIX 17G IN WATER AND DRINK DAILY 10/29/19  Yes Wanda Ramsay MD   omeprazole (PRILOSEC) 20 mg capsule TAKE ONE CAPSULE BY MOUTH ONCE DAILY  Patient taking differently: as needed. TAKE ONE CAPSULE BY MOUTH ONCE DAILY 10/28/19  Yes Wanda Ramsay MD   OTHER    Yes Provider, Historical   Blood-Glucose Meter (TRUE METRIX GLUCOSE METER) misc Use daily as needed for diabetes management. Diagnosis E11.9 11/20/17  Yes Wanda Ramsay MD   glucose blood VI test strips (TRUE METRIX GLUCOSE TEST STRIP) strip Use 1 to 2 times daily as needed for Diabetes management. Diagnosis is 411.9. 11/20/17  Yes Wanda Ramsay MD   glucose blood VI test strips (TRUETEST TEST STRIPS) strip by Does Not Apply route See Admin Instructions. 11/20/17  Yes Wanda Ramsay MD   UBIDECARENONE (CO Q-10 PO) Take  by mouth. Yes Provider, Historical   OTHER Take 7 Tabs by mouth daily. [address]   Yes Provider, Historical   aspirin 81 mg tablet Take 81 mg by mouth daily. Indications: taking one a day 4/7/11  Yes Provider, Historical        Review of Systems              Constitutional:  He denies fever, weight loss, sweats or fatigue. EYES: No blurred or double vision, right eye red              ENT: no nasal congestion, no headache or dizziness. No difficulty with               swallowing, taste, speech or smell. Respiratory:  No cough, wheezing or shortness of breath. No sputum production. Cardiac:  Denies chest pain, palpitations, unexplained indigestion, syncope, edema, PND or orthopnea.     GI:  No changes in bowel movements, no abdominal pain, no bloating, anorexia, nausea, vomiting or heartburn. :  No frequency or dysuria. Denies incontinence or sexual dysfunction. Extremities:  No joint pain, stiffness or swelling  Back:.no pain or soreness  Skin:  No recent rashes or mole changes. Neurological:  No numbness, tingling, burning paresthesias or loss of motor strength. No syncope, dizziness, frequent headaches or memory loss. Hematologic:  No easy bruising  Lymphatic: No lymph node enlargement    Objective:     Vitals:    07/02/20 1353 07/02/20 1433   BP: 160/84 136/84   Pulse: 60    Temp: 98.7 °F (37.1 °C)    TempSrc: Oral    Weight: 114 lb 8 oz (51.9 kg)    Height: 4' 11\" (1.499 m)    PainSc:   2    PainLoc: Back        Body mass index is 23.13 kg/m². Physical Examination:              General Appearance:  Well-developed, well-nourished, no acute distress. HEENT:      Ears:  The TMs and ear canals were clear. Eyes:  The pupillary responses were normal.  Extraocular muscle function intact. Lids and conjunctiva not injected. Funduscopic exam revealed sharp disc margins. Nares: Clear w/o edema or erythema  Pharynx:  Clear with teeth in good repair. No masses were noted. Neck:  Supple without thyromegaly or adenopathy. No JVD noted. No carotid                bruits. Lungs:  Clear to auscultation and percussion. Cardiac:  Regular rate and rhythm without murmur. PMI not displaced. No gallop, rub or click. Abdominal: Soft, non-tender, no hepata-spleenomegally or masses  Extremities:  No clubbing, cyanosis or edema. Skin:  No rash or unusual mole changes noted. Lymph Nodes:  None felt in the cervical, supraclavicular, axillary or inguinal region. Neurological: . DTRs 2+ and symmetric. Station and gait normal.   Hematologic:   No purpura or petechiae        Assessment/Plan:         1. Subconjunctival hemorrhage of right eye    2. Essential hypertension    3.  Controlled type 2 diabetes mellitus with diabetic polyneuropathy, without long-term current use of insulin ([facility])        Impressions/Plan:  Impression  1. Subconjunctival hematoma right I reassured him regarding that but no treatment is necessary  2. Hypertension blood pressure initially up once he relaxed it came to normal  3. Diabetes we did discuss his diabetes and blood sugars  No change in treatment today and recheck per previous schedule. I will see him sooner should problems arise. No orders of the defined types were placed in this encounter. No results found for any visits on 07/02/20. Dayton Tai MD    The patient was given after the visit summary the patient verbalized an understanding of the plans and problems as explained.

## 2020-08-10 PROBLEM — Z13.39 ALCOHOL SCREENING: Status: ACTIVE | Noted: 2020-08-10

## 2020-08-10 RX ORDER — METFORMIN HYDROCHLORIDE 500 MG/1
TABLET ORAL
Qty: 180 TAB | Refills: 3 | Status: SHIPPED | OUTPATIENT
Start: 2020-08-10 | End: 2021-11-01

## 2020-08-10 NOTE — TELEPHONE ENCOUNTER
RX refill request from the patient/pharmacy. Patient last seen 07- with labs, and next appt. scheduled for 08-  Requested Prescriptions     Pending Prescriptions Disp Refills    metFORMIN (GLUCOPHAGE) 500 mg tablet [Pharmacy Med Name: METFORMIN  MG TABLET] 180 Tab 3     Sig: TAKE 1 TABLET BY MOUTH TWICE A DAY WITH MEALS   .

## 2020-08-11 ENCOUNTER — OFFICE VISIT (OUTPATIENT)
Dept: INTERNAL MEDICINE CLINIC | Age: 80
End: 2020-08-11
Payer: MEDICARE

## 2020-08-11 VITALS
RESPIRATION RATE: 16 BRPM | SYSTOLIC BLOOD PRESSURE: 138 MMHG | OXYGEN SATURATION: 98 % | HEIGHT: 60 IN | HEART RATE: 60 BPM | DIASTOLIC BLOOD PRESSURE: 86 MMHG | TEMPERATURE: 98 F | WEIGHT: 117.3 LBS | BODY MASS INDEX: 23.03 KG/M2

## 2020-08-11 DIAGNOSIS — F41.9 ANXIETY: ICD-10-CM

## 2020-08-11 DIAGNOSIS — Z13.39 ALCOHOL SCREENING: ICD-10-CM

## 2020-08-11 DIAGNOSIS — K57.90 DIVERTICULOSIS: ICD-10-CM

## 2020-08-11 DIAGNOSIS — M15.9 PRIMARY OSTEOARTHRITIS INVOLVING MULTIPLE JOINTS: ICD-10-CM

## 2020-08-11 DIAGNOSIS — I25.10 ASCVD (ARTERIOSCLEROTIC CARDIOVASCULAR DISEASE): ICD-10-CM

## 2020-08-11 DIAGNOSIS — N39.0 URINARY TRACT INFECTION WITH HEMATURIA, SITE UNSPECIFIED: ICD-10-CM

## 2020-08-11 DIAGNOSIS — R31.9 URINARY TRACT INFECTION WITH HEMATURIA, SITE UNSPECIFIED: ICD-10-CM

## 2020-08-11 DIAGNOSIS — E11.42 CONTROLLED TYPE 2 DIABETES MELLITUS WITH DIABETIC POLYNEUROPATHY, WITHOUT LONG-TERM CURRENT USE OF INSULIN (HCC): ICD-10-CM

## 2020-08-11 DIAGNOSIS — I42.9 CARDIOMYOPATHY, UNSPECIFIED TYPE (HCC): ICD-10-CM

## 2020-08-11 DIAGNOSIS — E78.2 MIXED HYPERLIPIDEMIA: ICD-10-CM

## 2020-08-11 DIAGNOSIS — I10 ESSENTIAL HYPERTENSION: Primary | ICD-10-CM

## 2020-08-11 DIAGNOSIS — K59.09 CONSTIPATION, CHRONIC: ICD-10-CM

## 2020-08-11 DIAGNOSIS — Z00.00 MEDICARE ANNUAL WELLNESS VISIT, SUBSEQUENT: ICD-10-CM

## 2020-08-11 LAB
A-G RATIO,AGRAT: 1.3 RATIO
ALBUMIN SERPL-MCNC: 4.1 G/DL (ref 3.9–5.4)
ALP SERPL-CCNC: 68 U/L (ref 38–126)
ALT SERPL-CCNC: 15 U/L (ref 0–50)
ANION GAP SERPL CALC-SCNC: 8 MMOL/L
AST SERPL W P-5'-P-CCNC: 27 U/L (ref 14–36)
BACTERIA,BACTU: ABNORMAL
BILIRUB SERPL-MCNC: 0.9 MG/DL (ref 0.2–1.3)
BILIRUB UR QL: NEGATIVE
BUN SERPL-MCNC: 15 MG/DL (ref 9–20)
BUN/CREATININE RATIO,BUCR: 21 RATIO
CALCIUM SERPL-MCNC: 10.1 MG/DL (ref 8.4–10.2)
CHLORIDE SERPL-SCNC: 104 MMOL/L (ref 98–107)
CHOL/HDL RATIO,CHHD: 4 RATIO (ref 0–4)
CHOLEST SERPL-MCNC: 254 MG/DL (ref 0–200)
CK SERPL-CCNC: 84 U/L (ref 30–135)
CLARITY: CLEAR
CO2 SERPL-SCNC: 29 MMOL/L (ref 22–32)
COLOR UR: ABNORMAL
CREAT SERPL-MCNC: 0.7 MG/DL (ref 0.8–1.5)
ERYTHROCYTE [DISTWIDTH] IN BLOOD BY AUTOMATED COUNT: 12.1 %
GLOBULIN,GLOB: 3.2
GLUCOSE 24H UR-MRATE: NEGATIVE G/(24.H)
GLUCOSE SERPL-MCNC: 82 MG/DL (ref 75–110)
HBA1C MFR BLD HPLC: 5.8 % (ref 4–5.7)
HCT VFR BLD AUTO: 50.5 % (ref 37–51)
HDLC SERPL-MCNC: 66 MG/DL (ref 35–130)
HGB BLD-MCNC: 16.3 G/DL (ref 12–18)
HGB UR QL STRIP: ABNORMAL
KETONES UR QL STRIP.AUTO: NEGATIVE
LDL/HDL RATIO,LDHD: 3 RATIO
LDLC SERPL CALC-MCNC: 171 MG/DL (ref 0–130)
LEUKOCYTE ESTERASE: ABNORMAL
LYMPHOCYTES ABSOLUTE: 2.4 K/UL (ref 0.6–4.1)
LYMPHOCYTES NFR BLD: 31.7 % (ref 10–58.5)
MCH RBC QN AUTO: 33.3 PG (ref 26–32)
MCHC RBC AUTO-ENTMCNC: 32.3 G/DL (ref 30–36)
MCV RBC AUTO: 103 FL (ref 80–97)
MICROALBUMIN, URINE: 100 MG/L (ref 0–20)
MONOCYTES ABS-DIF,2141: 0.6 K/UL (ref 0–1.8)
MONOCYTES NFR BLD: 8.2 % (ref 0.1–24)
NEUTROPHILS # BLD: 60.1 % (ref 37–92)
NEUTROPHILS ABS,2156: 4.6 K/UL (ref 2–7.8)
NITRITE UR QL STRIP.AUTO: NEGATIVE
PH UR STRIP: 6 [PH] (ref 5–7)
PLATELET # BLD AUTO: 189 K/UL (ref 140–440)
POTASSIUM SERPL-SCNC: 4.5 MMOL/L (ref 3.6–5)
PROT SERPL-MCNC: 7.3 G/DL (ref 6.3–8.2)
PROT UR STRIP-MCNC: ABNORMAL MG/DL
RBC # BLD AUTO: 4.9 M/UL (ref 4.2–6.3)
RBC #/AREA URNS HPF: ABNORMAL #/HPF
SODIUM SERPL-SCNC: 141 MMOL/L (ref 137–145)
SP GR UR REFRACTOMETRY: 1.02 (ref 1–1.03)
T4 FREE SERPL-MCNC: 1.12 NG/DL (ref 0.58–2.3)
TRIGL SERPL-MCNC: 87 MG/DL (ref 0–200)
TSH SERPL DL<=0.05 MIU/L-ACNC: 1.34 UIU/ML (ref 0.34–5.6)
UROBILINOGEN UR QL STRIP.AUTO: ABNORMAL
VLDLC SERPL CALC-MCNC: 17 MG/DL
WBC # BLD AUTO: 7.7 K/UL (ref 4.1–10.9)
WBC URNS QL MICRO: ABNORMAL #/HPF

## 2020-08-11 PROCEDURE — 85025 COMPLETE CBC W/AUTO DIFF WBC: CPT | Performed by: INTERNAL MEDICINE

## 2020-08-11 PROCEDURE — G0444 DEPRESSION SCREEN ANNUAL: HCPCS | Performed by: INTERNAL MEDICINE

## 2020-08-11 PROCEDURE — G0439 PPPS, SUBSEQ VISIT: HCPCS | Performed by: INTERNAL MEDICINE

## 2020-08-11 PROCEDURE — G8510 SCR DEP NEG, NO PLAN REQD: HCPCS | Performed by: INTERNAL MEDICINE

## 2020-08-11 PROCEDURE — G8752 SYS BP LESS 140: HCPCS | Performed by: INTERNAL MEDICINE

## 2020-08-11 PROCEDURE — 84439 ASSAY OF FREE THYROXINE: CPT | Performed by: INTERNAL MEDICINE

## 2020-08-11 PROCEDURE — 80061 LIPID PANEL: CPT | Performed by: INTERNAL MEDICINE

## 2020-08-11 PROCEDURE — 81003 URINALYSIS AUTO W/O SCOPE: CPT | Performed by: INTERNAL MEDICINE

## 2020-08-11 PROCEDURE — G8754 DIAS BP LESS 90: HCPCS | Performed by: INTERNAL MEDICINE

## 2020-08-11 PROCEDURE — 99214 OFFICE O/P EST MOD 30 MIN: CPT | Performed by: INTERNAL MEDICINE

## 2020-08-11 PROCEDURE — 84443 ASSAY THYROID STIM HORMONE: CPT | Performed by: INTERNAL MEDICINE

## 2020-08-11 PROCEDURE — G8536 NO DOC ELDER MAL SCRN: HCPCS | Performed by: INTERNAL MEDICINE

## 2020-08-11 PROCEDURE — G8427 DOCREV CUR MEDS BY ELIG CLIN: HCPCS | Performed by: INTERNAL MEDICINE

## 2020-08-11 PROCEDURE — 82550 ASSAY OF CK (CPK): CPT | Performed by: INTERNAL MEDICINE

## 2020-08-11 PROCEDURE — 93000 ELECTROCARDIOGRAM COMPLETE: CPT | Performed by: INTERNAL MEDICINE

## 2020-08-11 PROCEDURE — 83036 HEMOGLOBIN GLYCOSYLATED A1C: CPT | Performed by: INTERNAL MEDICINE

## 2020-08-11 PROCEDURE — G8420 CALC BMI NORM PARAMETERS: HCPCS | Performed by: INTERNAL MEDICINE

## 2020-08-11 PROCEDURE — 80053 COMPREHEN METABOLIC PANEL: CPT | Performed by: INTERNAL MEDICINE

## 2020-08-11 PROCEDURE — 1101F PT FALLS ASSESS-DOCD LE1/YR: CPT | Performed by: INTERNAL MEDICINE

## 2020-08-11 PROCEDURE — 82044 UR ALBUMIN SEMIQUANTITATIVE: CPT | Performed by: INTERNAL MEDICINE

## 2020-08-11 NOTE — PROGRESS NOTES
Cleveland Banuelos  Identified pt with two pt identifiers(name and ). Chief Complaint   Patient presents with   [address] [place] [address] Annual Wellness Visit       1. Have you been to the ER, urgent care clinic since your last visit? Hospitalized since your last visit? no    2. Have you seen or consulted any other health care providers outside of the [address] [place] [address] since your last visit? Include any pap smears or colon screening. no      Health Maintenance Topics with due status: Overdue       Topic Date Due    DTaP/Tdap/Td series 1960    Shingrix Vaccine Age 50> 1989    Influenza Age 5 to Adult 2020    MICROALBUMIN Q1 2020     Health Maintenance Topics with due status: Due On       Topic Date Due    Medicare Yearly Exam 2020     Health Maintenance Topics with due status: Not Due       Topic Last Completion Date    GLAUCOMA SCREENING Q2Y 10/17/2018    Eye Exam Retinal or Dilated 10/17/2018    Foot Exam Q1 2020     Health Maintenance Topics with due status: Completed       Topic Last Completion Date    Pneumococcal 65+ years 2015           Medication reconciliation up to date and corrected with patient at this time. Today's provider has been notified of reason for visit, vitals and flowsheets obtained on patients. Reviewed record in preparation for visit, huddled with provider and have obtained necessary documentation. Wt Readings from Last 3 Encounters:   20 114 lb 8 oz (51.9 kg)   20 116 lb (52.6 kg)   20 117 lb 12.8 oz (53.4 kg)     Temp Readings from Last 3 Encounters:   20 98.7 °F (37.1 °C) (Oral)   20 97.8 °F (36.6 °C) (Oral)   20 97.9 °F (36.6 °C)     BP Readings from Last 3 Encounters:   20 136/84   20 134/86   20 126/84     Pulse Readings from Last 3 Encounters:   20 60   20 60   20 60     There were no vitals filed for this visit.       Learning Assessment:  :     Learning Assessment 2017 PRIMARY LEARNER Patient   CO-LEARNER CAREGIVER No   PRIMARY LANGUAGE ENGLISH   LEARNER PREFERENCE PRIMARY DEMONSTRATION   ANSWERED BY patient   RELATIONSHIP SELF       Depression Screening:  :     3 most recent PHQ Screens 8/11/2020   Little interest or pleasure in doing things Not at all   Feeling down, depressed, irritable, or hopeless Not at all   Total Score PHQ 2 0       No flowsheet data found. Fall Risk Assessment:  :     Fall Risk Assessment, last 12 mths 8/11/2020   Able to walk? Yes   Fall in past 12 months? No       Abuse Screening:  :     Abuse Screening Questionnaire 8/11/2020 5/1/2018 8/7/2017   Do you ever feel afraid of your partner? N N N   Are you in a relationship with someone who physically or mentally threatens you? N N N   Is it safe for you to go home?  Y Y Y       ADL Screening:  :     ADL Assessment 8/11/2020   Feeding yourself No Help Needed   Getting from bed to chair No Help Needed   Getting dressed No Help Needed   Bathing or showering No Help Needed   Walk across the room (includes cane/walker) No Help Needed   Using the telphone No Help Needed   Taking your medications No Help Needed   Preparing meals No Help Needed   Managing money (expenses/bills) No Help Needed   Moderately strenuous housework (laundry) No Help Needed   Shopping for personal items (toiletries/medicines) No Help Needed   Shopping for groceries No Help Needed   Driving No Help Needed   Climbing a flight of stairs No Help Needed   Getting to places beyond walking distances No Help Needed

## 2020-08-11 NOTE — PATIENT INSTRUCTIONS

## 2020-08-11 NOTE — PROGRESS NOTES
This is a Subsequent Medicare Annual Wellness Visit providing Personalized Prevention Plan Services (PPPS) (Performed 12 months after initial AWV and PPPS )    I have reviewed the patient's medical history in detail and updated the computerized patient record. Patient today for his Medicare subsequent annual wellness examination and screening questionnaire. He is also in follow-up of his multiple medical problems include hypertension, cardiomyopathy, ASCVD, diabetes mellitus, hyperlipidemia, DJD, hypogonadism, anxiety and other medical problems. He is taking his medications and trying to follow his diet and try and remain physically active. He currently denies any chest pain, shortness of breath, palpitations, PND, orthopnea or other cardiorespiratory complaints. He notes no GI or  complaints. He notes no headaches, dizziness or neurologic complaints. There are no current change of his chronic arthritic complaints and he has no other complaints on complete review of systems.     History     Past Medical History:   Diagnosis Date    Anxiety 8/5/2017    Arrhythmia     Arthritis     Atherosclerotic heart disease of native coronary artery without angina pectoris 8/5/2017    Bradycardia 8/5/2017    CAD (coronary artery disease)     Constipation, chronic 8/5/2017    Diabetes ([facility]     diet controlled    Diabetes mellitus ([address] 8/5/2017    Diverticulosis 8/5/2017    ED (erectile dysfunction) 8/5/2017    GERD (gastroesophageal reflux disease)     Hypertension     Hypogonadism male 8/5/2017    Kidney stone     Neuropathy 8/5/2017    On statin therapy 8/5/2017    Right foot pain 8/5/2017      Past Surgical History:   Procedure Laterality Date    CARDIAC SURG PROCEDURE UNLIST      cabg x4 vessels 1999    COLONOSCOPY N/A 6/21/2016    COLONOSCOPY performed by Jhoana Pruett MD at [facility]; HI RISK IND  6/21/2016         HX GI      COLONOSCOPY    HX HEART CATHETERIZATION      HX OTHER SURGICAL      artificial testicle    HX PACEMAKER  10/6/15    MS COLONOSCOPY FLX DX W/COLLJ SPEC WHEN PFRMD  4/8/2011         UPPER GI ENDOSCOPY,BIOPSY  12/14/2016          Social History     Tobacco Use    Smoking status: Never Smoker    Smokeless tobacco: Never Used   Substance Use Topics    Alcohol use: No    Drug use: No     Current Outpatient Medications   Medication Sig Dispense Refill    metFORMIN (GLUCOPHAGE) 500 mg tablet TAKE 1 TABLET BY MOUTH TWICE A DAY WITH MEALS (Patient taking differently: One tablet daily) 180 Tab 3    metoprolol succinate (TOPROL-XL) 25 mg XL tablet TAKE 1 TAB BY MOUTH DAILY 90 Tab 3    ramipriL (ALTACE) 10 mg capsule Take 2 tablets daily 180 Cap 3    PURELAX 17 gram/dose powder MIX 17G IN WATER AND DRINK DAILY 850 g 5    omeprazole (PRILOSEC) 20 mg capsule TAKE ONE CAPSULE BY MOUTH ONCE DAILY (Patient taking differently: as needed. TAKE ONE CAPSULE BY MOUTH ONCE DAILY) 90 Cap 3    OTHER       Blood-Glucose Meter (TRUE METRIX GLUCOSE METER) misc Use daily as needed for diabetes management. Diagnosis E11.9 1 Each 0    glucose blood VI test strips (TRUE METRIX GLUCOSE TEST STRIP) strip Use 1 to 2 times daily as needed for Diabetes management. Diagnosis is 411.9. 50 Strip 5    glucose blood VI test strips (TRUETEST TEST STRIPS) strip by Does Not Apply route See Admin Instructions. 100 Strip prn    UBIDECARENONE (CO Q-10 PO) Take  by mouth.  OTHER Take 7 Tabs by mouth daily. HEART HEALTH FROM [employer]      aspirin 81 mg tablet Take 81 mg by mouth daily.  Indications: taking one a day       Allergies   Allergen Reactions    Caffeine Unknown (comments)     Sweating AND WOOZY AND CAN'T SLEEP    Codeine Other (comments)     Lightheaded, WOOZY AND CAN'T SLEEP    Percocet [Oxycodone-Acetaminophen] Nausea and Vomiting     Family History   Problem Relation Age of Onset    Heart Disease Mother     No Known Problems Father     Cancer Sister    Paula Yanez Diabetes Brother     Anesth Problems Neg Hx        Patient Active Problem List    Diagnosis    Controlled type 2 diabetes mellitus with diabetic polyneuropathy, without long-term current use of insulin ([address]    Cardiomyopathy [facility]    ASCVD (arteriosclerotic cardiovascular disease)    Essential hypertension    Mixed hyperlipidemia    Primary osteoarthritis involving multiple joints    Anxiety    Alcohol screening    Subconjunctival hemorrhage of right eye    Chest pain    Neck pain    Pain of right hand    Epigastric pain    Medicare annual wellness visit, subsequent    Diverticulosis    Neuropathy    Hypogonadism male    ED (erectile dysfunction)    Constipation, chronic    Bradycardia       Patient Care Team:  Nati Mckeon MD as PCP - General (Internal Medicine)  Nati Mckeon MD as PCP - [facility] Empaneled Provider    Depression Risk Factor Screening:     3 most recent PHQ Screens 8/11/2020   Little interest or pleasure in doing things Not at all   Feeling down, depressed, irritable, or hopeless Not at all   Total Score PHQ 2 0     Alcohol Risk Factor Screening: You do not drink alcohol or very rarely. Functional Ability and Level of Safety:     Fall Risk     Fall Risk Assessment, last 12 mths 8/11/2020   Able to walk? Yes   Fall in past 12 months? No       Hearing Loss   mild    Activities of Daily Living   Self-care.    ADL Assessment 8/11/2020   Feeding yourself No Help Needed   Getting from bed to chair No Help Needed   Getting dressed No Help Needed   Bathing or showering No Help Needed   Walk across the room (includes cane/walker) No Help Needed   Using the telphone No Help Needed   Taking your medications No Help Needed   Preparing meals No Help Needed   Managing money (expenses/bills) No Help Needed   Moderately strenuous housework (laundry) No Help Needed   Shopping for personal items (toiletries/medicines) No Help Needed   Shopping for groceries No Help Needed   Driving No Help Needed   Climbing a flight of stairs No Help Needed   Getting to places beyond walking distances No Help Needed       Abuse Screen   Patient is not abused    Social History     Social History Narrative    Not on file       Review of Systems      ROS:    Constitutional: He denies fevers, weight loss, sweats. Eyes: No blurred or double vision. ENT: No difficulty with swallowing, taste, speech or smell. Neck: no stiffness or swelling  Respiratory: No cough wheezing or shortness of breath. Cardiovascular: Denies chest pain, palpitations, unexplained indigestion or syncope. Gastrointestinal:  No changes in bowel movements, no abdominal pain, no bloating. Genitourinary:  He denies frequency, nocturia or stranguria. Extremities: No change of his chronic joint pain, stiffness or swelling. Neurological:  No numbness, tingling, burring paresthesias or loss of motor strength. No syncope, dizziness or frequent headache  Lymphatic: no adenopathy noted  Hematologic: no easy bruising or bleeding gums  Skin:  No recent rashes or mole changes. Psychiatric/Behavioral:  Negative for depression. Physical Examination     Evaluation of Cognitive Function:  Mood/affect:  happy  Appearance: age appropriate  Family member/caregiver input: none    Visit Vitals  /86   Pulse 60   Temp 98 °F (36.7 °C)   Resp 16   Ht 4' 11\" (1.499 m)   Wt 117 lb 4.8 oz (53.2 kg)   SpO2 98%   BMI 23.69 kg/m²     Vitals:    08/11/20 1033 08/11/20 1115   BP: 170/82 138/86   Pulse: 60    Resp: 16    Temp: 98 °F (36.7 °C)    SpO2: 98%    Weight: 117 lb 4.8 oz (53.2 kg)    Height: 4' 11\" (1.499 m)    PainSc:   0 - No pain         PHYSICAL EXAM:    General appearance - alert, well appearing, and in no distress  Mental status - alert, oriented to person, place, and time  HEENT:  Ears - bilateral TM's and external ear canals clear  Eyes - pupillary responses were normal.  Extraocular muscle function intact.   Lids and conjunctiva not injected. Fundoscopic exam revealed sharp disc margins. eye movements intact  Pharynx- clear with teeth in good repair. No masses were noted  Neck - supple without thyromegaly or burit. No JVD noted  Lungs - clear to auscultation and percussion  Cardiac- normal rate, regular rhythm without murmurs. PMI not displaced. No gallop, rub or click  Abdomen - flat, soft, non-tender without palpable organomegaly or mass. No pulsatile mass was felt, and not bruit was heard. Bowel sounds were active  : Circumcised, Testes descended w/o masses  Rectal: normal sphincter tone, prostate normal, no masses, stool brown and hemacult negative  Extremities -  no clubbing cyanosis or edema  Lymphatics - no palpable lymphadenopathy, no hepatosplenomegaly  Hematologic: no petechiae or purpura  Peripheral vascular -Femoral, Dorsalis pedis and posterior tibial pulses felt without difficulty  Skin - no rash or unusual mole change noted  Neurological - Cranial nerves II-XII grossly intact. Motor strength 5/5. DTR's 2+ and symmetric. Station and gait normal  Back exam - full range of motion, no tenderness, palpable spasm or pain on motion  Musculoskeletal - no joint tenderness, deformity or swelling        Results for orders placed or performed in visit on 41/36/65   METABOLIC PANEL, COMPREHENSIVE   Result Value Ref Range    Glucose 61 (L) 75 - 110 mg/dL    BUN 18.0 9.0 - 20.0 mg/dL    Creatinine 0.7 (L) 0.8 - 1.5 mg/dL    Sodium 143 137 - 145 mmol/L    Potassium 5.2 (H) 3.6 - 5.0 mmol/L    Chloride 104 98 - 107 mmol/L    CO2 31.0 22.0 - 32.0 mmol/L    Calcium 10.2 8.4 - 10.2 mg/dl    Protein, total 7.4 6.3 - 8.2 g/dL    Albumin 4.1 3.9 - 5.4 g/dL    ALT (SGPT) 15 0 - 50 U/L    AST (SGOT) 26.0 14.0 - 36.0 U/L    Alk.  phosphatase 66 38 - 126 U/L    Bilirubin, total 0.9 0.2 - 1.3 mg/dL    BUN/Creatinine ratio 26 Ratio    GFR est AA >60 mL/min/1.73m2    GFR est non-AA >60 mL/min/1.73m2    Globulin 3.30     A-G Ratio 1.2 Ratio    Anion gap 8 mmol/L   LIPID PANEL   Result Value Ref Range    Cholesterol, total 239 (H) 0 - 200 mg/dL    Triglyceride 82 0 - 200 mg/dL    HDL Cholesterol 60 35 - 130 mg/dL    VLDL 16 mg/dL    LDL, calculated 163 (H) 0 - 130 mg/dL    CHOL/HDL Ratio 4 0 - 4 Ratio    LDL/HDL Ratio 3 Ratio   CK   Result Value Ref Range    CK 87.00 30.00 - 135.00 U/L   HEMOGLOBIN A1C W/O EAG   Result Value Ref Range    Hemoglobin A1c 5.7 4.0 - 5.7 %       Advice/Referrals/Counseling   Education and counseling provided:  Are appropriate based on today's review and evaluation  End-of-Life planning (with patient's consent)  Pneumococcal Vaccine  Influenza Vaccine  Colorectal cancer screening tests      Assessment/Plan     ASSESSMENT:   1. Essential hypertension    2. Controlled type 2 diabetes mellitus with diabetic polyneuropathy, without long-term current use of insulin ([facility]    3. Mixed hyperlipidemia    4. Primary osteoarthritis involving multiple joints    5. ASCVD (arteriosclerotic cardiovascular disease)    6. Cardiomyopathy, unspecified type ([facility]    7. Anxiety    8. Diverticulosis    9. Alcohol screening    10. Medicare annual wellness visit, subsequent    11. Constipation, chronic      Impression  1. Hypertension that is controlled so continue current therapy reviewed with him. 2.  Diabetes mellitus repeat status pending a prior lab review not make adjustments if necessary. 3.  Hyperlipidemia prior lab reviewed and repeat status pending I will adjust if needed. 4. DJD that is stable  5   ASCVD clinically stable continue aspirin daily  6. Cardiomyopathy that is currently stable  7. Anxiety that is stable  8. Diverticulosis no recent symptoms but he is having some constipation so I think he does need to have a colonoscopy  9. Annual alcohol screening is done and he does drink a rare social drink.   I cautioned regarding more than 2/day in males with increased cardiovascular risk and increased risk of liver disease and other GI effects. 10.  Chronic constipation I have set him up for follow-up colonoscopy  Medicare subsequent annual wellness examination and screening questionnaire is completed today. The results were reviewed with him and his questions were answered. Lifestyle recommendations and modifications discussed and made. I will call with lab results and make further recommendations or adjustments if necessary. Follow-up in 3 months or sooner if there is a problem. PLAN:  .  Orders Placed This Encounter    CBC WITH AUTOMATED DIFF ([facility])    METABOLIC PANEL, COMPREHENSIVE ([facility])    LIPID PANEL ([facility])    CK ([facility])    HEMOGLOBIN A1C W/O EAG ([facility])    T4, FREE ([facility])    TSH 3RD GENERATION ([facility])    URINALYSIS W/O MICRO ([facility])    URINE, MICROALBUMIN, SEMIQUANTITATIVE ([facility])    Est[facility]    AMB POC EKG ROUTINE W/ 12 LEADS, INTER & REP         ATTENTION:   This medical record was transcribed using an electronic medical records system. Although proofread, it may and can contain electronic and spelling errors. Other human spelling and other errors may be present. Corrections may be executed at a later time. Please feel free to contact us for any clarifications as needed. Follow-up and Dispositions    · Return in about 3 months (around 11/11/2020). Analy Velasquez MD    Recommended healthy diet low in carbohydrates, fats, sodium and cholesterol. Recommended regular cardiovascular exercise 3-6 times per week for 30-60 minutes daily.       Current Outpatient Medications   Medication Sig Dispense Refill    metFORMIN (GLUCOPHAGE) 500 mg tablet TAKE 1 TABLET BY MOUTH TWICE A DAY WITH MEALS (Patient taking differently: One tablet daily) 180 Tab 3    metoprolol succinate (TOPROL-XL) 25 mg XL tablet TAKE 1 TAB BY MOUTH DAILY 90 Tab 3    ramipriL (ALTACE) 10 mg capsule Take 2 tablets daily 180 Cap 3    PURELAX 17 gram/dose powder MIX 17G IN WATER AND DRINK DAILY 850 g 5    omeprazole (PRILOSEC) 20 mg capsule TAKE ONE CAPSULE BY MOUTH ONCE DAILY (Patient taking differently: as needed. TAKE ONE CAPSULE BY MOUTH ONCE DAILY) 90 Cap 3    OTHER       Blood-Glucose Meter (TRUE METRIX GLUCOSE METER) misc Use daily as needed for diabetes management. Diagnosis E11.9 1 Each 0    glucose blood VI test strips (TRUE METRIX GLUCOSE TEST STRIP) strip Use 1 to 2 times daily as needed for Diabetes management. Diagnosis is 411.9. 50 Strip 5    glucose blood VI test strips (TRUETEST TEST STRIPS) strip by Does Not Apply route See Admin Instructions. 100 Strip prn    UBIDECARENONE (CO Q-10 PO) Take  by mouth.  OTHER Take 7 Tabs by mouth daily. HEART HEALTH FROM [employer]      aspirin 81 mg tablet Take 81 mg by mouth daily. Indications: taking one a day         No results found for any visits on 08/11/20. Verbal and written instructions (see AVS) provided. Patient expresses understanding of diagnosis and treatment plan.     Ugo Koroma MD

## 2020-08-13 LAB — BACTERIA UR CULT: NORMAL

## 2020-08-14 RX ORDER — DOXYCYCLINE 100 MG/1
100 CAPSULE ORAL 2 TIMES DAILY
Qty: 20 CAP | Refills: 0 | Status: SHIPPED | OUTPATIENT
Start: 2020-08-14 | End: 2020-08-24

## 2020-08-14 NOTE — TELEPHONE ENCOUNTER
RX refill request from the patient/pharmacy. Patient last seen 08- with labs, and needs to get a f/up ua and culture after treatment. Requested Prescriptions     Pending Prescriptions Disp Refills    doxycycline (VIBRAMYCIN) 100 mg capsule 20 Cap 0     Sig: Take 1 Cap by mouth two (2) times a day for 10 days.

## 2020-08-14 NOTE — PROGRESS NOTES
RBCs in UA so Doxycycline 100 BID for 10 days and F/U UA.  Still good HDL but LDL up a little so diet

## 2020-08-14 NOTE — PROGRESS NOTES
RBCs in UA so Doxycycline 100 BID for 10 days and F/U UA. Still good HDL but LDL up a little so diet. Discussed with patient. Rx sent to patient's pharmacy. Advised will need a f/up ua and culture after treatment.

## 2020-08-28 ENCOUNTER — LAB ONLY (OUTPATIENT)
Dept: INTERNAL MEDICINE CLINIC | Age: 80
End: 2020-08-28
Payer: MEDICARE

## 2020-08-28 DIAGNOSIS — N30.00 ACUTE CYSTITIS WITHOUT HEMATURIA: Primary | ICD-10-CM

## 2020-08-28 LAB
BACTERIA,BACTU: ABNORMAL
BILIRUB UR QL: NEGATIVE
CLARITY: CLEAR
COLOR UR: ABNORMAL
GLUCOSE 24H UR-MRATE: ABNORMAL G/(24.H)
HGB UR QL STRIP: ABNORMAL
KETONES UR QL STRIP.AUTO: NEGATIVE
LEUKOCYTE ESTERASE: ABNORMAL
MUCUS,MUCUS: ABNORMAL
NITRITE UR QL STRIP.AUTO: NEGATIVE
PH UR STRIP: 5 [PH] (ref 5–7)
PROT UR STRIP-MCNC: ABNORMAL MG/DL
RBC #/AREA URNS HPF: ABNORMAL #/HPF
SP GR UR REFRACTOMETRY: 1.02 (ref 1–1.03)
UROBILINOGEN UR QL STRIP.AUTO: NEGATIVE
WBC URNS QL MICRO: ABNORMAL #/HPF

## 2020-08-28 PROCEDURE — 81001 URINALYSIS AUTO W/SCOPE: CPT | Performed by: INTERNAL MEDICINE

## 2020-08-30 LAB — BACTERIA UR CULT: NORMAL

## 2020-08-31 ENCOUNTER — TELEPHONE (OUTPATIENT)
Dept: INTERNAL MEDICINE CLINIC | Age: 80
End: 2020-08-31

## 2020-08-31 DIAGNOSIS — R31.29 OTHER MICROSCOPIC HEMATURIA: Primary | ICD-10-CM

## 2020-08-31 NOTE — PROGRESS NOTES
F/u urine 10-20 RBC still so Urology appt. Patient is seeing Dr. Jamie Dove and has a f/up appointment 9-. Will fax copy to him prior to his appointment.

## 2020-09-09 PROBLEM — Z00.00 MEDICARE ANNUAL WELLNESS VISIT, SUBSEQUENT: Status: RESOLVED | Noted: 2017-08-07 | Resolved: 2020-09-09

## 2020-09-21 ENCOUNTER — HOSPITAL ENCOUNTER (OUTPATIENT)
Dept: CT IMAGING | Age: 80
Discharge: HOME | End: 2020-09-21
Attending: UROLOGY
Payer: MEDICARE

## 2020-09-21 DIAGNOSIS — R31.9 HEMATURIA: ICD-10-CM

## 2020-09-21 PROCEDURE — 74178 CT ABD&PLV WO CNTR FLWD CNTR: CPT

## 2020-09-21 PROCEDURE — 74011000636 HC RX REV CODE- 636: Performed by: UROLOGY

## 2020-09-21 RX ORDER — SODIUM CHLORIDE 0.9 % (FLUSH) 0.9 %
10 SYRINGE (ML) INJECTION
Status: COMPLETED | OUTPATIENT
Start: 2020-09-21 | End: 2020-09-21

## 2020-09-21 RX ADMIN — Medication 10 ML: at 12:58

## 2020-09-21 RX ADMIN — IOPAMIDOL 100 ML: 755 INJECTION, SOLUTION INTRAVENOUS at 12:58

## 2020-11-06 ENCOUNTER — CLINICAL SUPPORT (OUTPATIENT)
Dept: INTERNAL MEDICINE CLINIC | Age: 80
End: 2020-11-06
Payer: MEDICARE

## 2020-11-06 VITALS
HEIGHT: 60 IN | SYSTOLIC BLOOD PRESSURE: 144 MMHG | OXYGEN SATURATION: 98 % | HEART RATE: 84 BPM | TEMPERATURE: 98 F | DIASTOLIC BLOOD PRESSURE: 86 MMHG | RESPIRATION RATE: 18 BRPM | WEIGHT: 115.4 LBS | BODY MASS INDEX: 22.65 KG/M2

## 2020-11-06 DIAGNOSIS — Z23 NEEDS FLU SHOT: Primary | ICD-10-CM

## 2020-11-06 PROCEDURE — G0008 ADMIN INFLUENZA VIRUS VAC: HCPCS

## 2020-11-06 PROCEDURE — 90694 VACC AIIV4 NO PRSRV 0.5ML IM: CPT

## 2020-11-06 NOTE — PROGRESS NOTES
Chief Complaint   Patient presents with    Immunization/Injection     flu shot       Visit Vitals  BP (!) 144/86 (BP 1 Location: Left arm, BP Patient Position: Sitting)   Pulse 84   Temp 98 °F (36.7 °C) (Oral)   Resp 18   Ht 4' 11\" (1.499 m)   Wt 115 lb 6.4 oz (52.3 kg)   SpO2 98%   BMI 23.31 kg/m²       After obtaining consent and per verbal order from Dr. Berta Le, patient received influenza vaccine given by Lolly Hull and verified by Suraj Cabrera. Fluad Influenza 0.5ml was given IM in left deltoid. Patient tolerated injection and was observed for 10 minutes post injection. VIS was given.

## 2020-11-16 NOTE — PROGRESS NOTES
Chief Complaint   Patient presents with    Hypertension     3 month follow up    Diabetes    Cholesterol Problem       SUBJECTIVE:    Nita Woods is a 79 y.o. male urgent follow-up of his medical problems include hypertension, diabetes, hyperlipidemia, cardiomyopathy, ASCVD, anxiety and other medical problems. He is taking his medications and trying to follow his diet and trying to remain physically active. He currently denies any chest pain, shortness of breath, palpitations, PND, orthopnea or other cardiac or respiratory complaints. He notes no GI or  complaints. He notes no headaches, dizziness or neurologic complaints. There are no current active arthritic complaints and he has no other complaints on complete review of systems. Current Outpatient Medications   Medication Sig Dispense Refill    metFORMIN (GLUCOPHAGE) 500 mg tablet TAKE 1 TABLET BY MOUTH TWICE A DAY WITH MEALS (Patient taking differently: One tablet daily) 180 Tab 3    metoprolol succinate (TOPROL-XL) 25 mg XL tablet TAKE 1 TAB BY MOUTH DAILY 90 Tab 3    ramipriL (ALTACE) 10 mg capsule Take 2 tablets daily 180 Cap 3    PURELAX 17 gram/dose powder MIX 17G IN WATER AND DRINK DAILY 850 g 5    omeprazole (PRILOSEC) 20 mg capsule TAKE ONE CAPSULE BY MOUTH ONCE DAILY (Patient taking differently: as needed. TAKE ONE CAPSULE BY MOUTH ONCE DAILY) 90 Cap 3    OTHER       Blood-Glucose Meter (TRUE METRIX GLUCOSE METER) misc Use daily as needed for diabetes management. Diagnosis E11.9 1 Each 0    glucose blood VI test strips (TRUE METRIX GLUCOSE TEST STRIP) strip Use 1 to 2 times daily as needed for Diabetes management. Diagnosis is 411.9. 50 Strip 5    glucose blood VI test strips (TRUETEST TEST STRIPS) strip by Does Not Apply route See Admin Instructions. 100 Strip prn    UBIDECARENONE (CO Q-10 PO) Take  by mouth.  OTHER Take 7 Tabs by mouth daily.  HEART HEALTH FROM [email or web address]      aspirin 81 mg tablet Take 81 mg by mouth daily. Indications: taking one a day       Past Medical History:   Diagnosis Date    Anxiety 8/5/2017    Arrhythmia     Arthritis     Atherosclerotic heart disease of native coronary artery without angina pectoris 8/5/2017    Bradycardia 8/5/2017    CAD (coronary artery disease)     Constipation, chronic 8/5/2017    Diabetes ([facility] [address]     diet controlled    Diabetes mellitus ([facility] [address] 8/5/2017    Diverticulosis 8/5/2017    ED (erectile dysfunction) 8/5/2017    GERD (gastroesophageal reflux disease)     Hypertension     Hypogonadism male 8/5/2017    Kidney stone     Neuropathy 8/5/2017    On statin therapy 8/5/2017    Right foot pain 8/5/2017     Past Surgical History:   Procedure Laterality Date    CARDIAC SURG PROCEDURE UNLIST      cabg x4 vessels 1999    COLONOSCOPY N/A 6/21/2016    COLONOSCOPY performed by Harjinder Obrien MD at [address]; HI RISK IND  6/21/2016         HX GI      COLONOSCOPY    HX HEART CATHETERIZATION      HX OTHER SURGICAL      artificial testicle    HX PACEMAKER  10/6/15    AR COLONOSCOPY FLX DX W/COLLJ SPEC WHEN PFRMD  4/8/2011         UPPER GI ENDOSCOPY,BIOPSY  12/14/2016          Allergies   Allergen Reactions    Caffeine Unknown (comments)     Sweating AND WOOZY AND CAN'T SLEEP    Codeine Other (comments)     Lightheaded, WOOZY AND CAN'T SLEEP    Percocet [Oxycodone-Acetaminophen] Nausea and Vomiting       REVIEW OF SYSTEMS:  General: negative for - chills or fever, or weight loss or gain  ENT: negative for - headaches, nasal congestion or tinnitus  Eyes: no blurred or visual changes  Neck: No stiffness or swollen nodes  Respiratory: negative for - cough, hemoptysis, shortness of breath or wheezing  Cardiovascular : negative for - chest pain, edema, palpitations or shortness of breath  Gastrointestinal: negative for - abdominal pain, blood in stools, heartburn or nausea/vomiting  Genito-Urinary: no dysuria, trouble voiding, or hematuria  Musculoskeletal: negative for - gait disturbance, joint pain, joint stiffness or joint swelling  Neurological: no TIA or stroke symptoms  Hematologic: no bruises, no bleeding  Lymphatic: no swollen glands  Integument: no lumps, mole changes, nail changes or rash  Endocrine:no malaise/lethargy poly uria or polydipsia or unexpected weight changes        Social History     Socioeconomic History    Marital status:      Spouse name: Not on file    Number of children: Not on file    Years of education: Not on file    Highest education level: Not on file   Tobacco Use    Smoking status: Never Smoker    Smokeless tobacco: Never Used   Substance and Sexual Activity    Alcohol use: No    Drug use: No     Family History   Problem Relation Age of Onset    Heart Disease Mother     No Known Problems Father     Cancer Sister     Diabetes Brother     Anesth Problems Neg Hx        OBJECTIVE:     Visit Vitals  /86   Pulse 60   Temp 98.4 °F (36.9 °C) (Oral)   Resp 17   Ht 4' 11\" (1.499 m)   Wt 116 lb (52.6 kg)   SpO2 97%   BMI 23.43 kg/m²     CONSTITUTIONAL:   well nourished, appears age appropriate  EYES: sclera anicteric, PERRL, EOMI  ENMT:nares clear, moist mucous membranes, pharynx clear  NECK: supple. Thyroid normal, No JVD or bruits  RESPIRATORY: Chest: clear to ascultation and percussion, normal inspiratory effort  CARDIOVASCULAR: Heart: regular rate and rhythm no murmurs, rubs or gallops, PMI not displaced, No thrills, no peripheral edema  GASTROINTESTINAL: Abdomen: non distended, soft, non tender, bowel sounds normal  HEMATOLOGIC: no purpura, petechiae or bruising  LYMPHATIC: No lymph node enlargemant  MUSCULOSKELETAL: Extremities: no active synovitis, pulse 1+   INTEGUMENT: No unusual rashes or suspicious skin lesions noted.  Nails appear normal.  PERIPHERAL VASCULAR: normal pulses femoral, PT and DP  NEUROLOGIC: non-focal exam, A & O X 3  PSYCHIATRIC:, appropriate affect     ASSESSMENT: 1. Essential hypertension    2. Controlled type 2 diabetes mellitus with diabetic polyneuropathy, without long-term current use of insulin ([facility] [address]    3. Mixed hyperlipidemia    4. Cardiomyopathy, unspecified type ([facility] [address]    5. ASCVD (arteriosclerotic cardiovascular disease)    6. Primary osteoarthritis involving multiple joints      Impression  1. Hypertension that is controlled so continue current therapy reviewed with him. 2.  Diabetes mellitus repeat status pending and prior lab reviewed not make adjustments if necessary. 3.  Hyperlipidemia prior lab reviewed and repeat status pending and will adjust if needed. 4.  Cardiomyopathy that is stable  5. ASCVD clinically stable continue aspirin daily  6. DJD that is stable  I will call with lab and make further recommendations or adjustments if necessary. Follow stable continue same and follow-up in 3 months or sooner if there is a problem. PLAN:  .  Orders Placed This Encounter    METABOLIC PANEL, COMPREHENSIVE ([facility])    LIPID PANEL ([facility])    CK ([facility])    HEMOGLOBIN A1C W/O EAG ([facility])         ATTENTION:   This medical record was transcribed using an electronic medical records system. Although proofread, it may and can contain electronic and spelling errors. Other human spelling and other errors may be present. Corrections may be executed at a later time. Please feel free to contact us for any clarifications as needed. Follow-up and Dispositions    · Return in about 3 months (around 2/17/2021). No results found for any visits on 11/17/20. Juan Plummer MD    The patient verbalized understanding of the problems and plans as explained.

## 2020-11-17 ENCOUNTER — OFFICE VISIT (OUTPATIENT)
Dept: INTERNAL MEDICINE CLINIC | Age: 80
End: 2020-11-17
Payer: MEDICARE

## 2020-11-17 VITALS
TEMPERATURE: 98.4 F | HEIGHT: 60 IN | BODY MASS INDEX: 22.78 KG/M2 | OXYGEN SATURATION: 97 % | RESPIRATION RATE: 17 BRPM | DIASTOLIC BLOOD PRESSURE: 86 MMHG | HEART RATE: 60 BPM | WEIGHT: 116 LBS | SYSTOLIC BLOOD PRESSURE: 130 MMHG

## 2020-11-17 DIAGNOSIS — I25.10 ASCVD (ARTERIOSCLEROTIC CARDIOVASCULAR DISEASE): ICD-10-CM

## 2020-11-17 DIAGNOSIS — E11.42 CONTROLLED TYPE 2 DIABETES MELLITUS WITH DIABETIC POLYNEUROPATHY, WITHOUT LONG-TERM CURRENT USE OF INSULIN (HCC): ICD-10-CM

## 2020-11-17 DIAGNOSIS — E78.2 MIXED HYPERLIPIDEMIA: ICD-10-CM

## 2020-11-17 DIAGNOSIS — I10 ESSENTIAL HYPERTENSION: Primary | ICD-10-CM

## 2020-11-17 DIAGNOSIS — M15.9 PRIMARY OSTEOARTHRITIS INVOLVING MULTIPLE JOINTS: ICD-10-CM

## 2020-11-17 DIAGNOSIS — I42.9 CARDIOMYOPATHY, UNSPECIFIED TYPE (HCC): ICD-10-CM

## 2020-11-17 LAB — HBA1C MFR BLD HPLC: 5.4 % (ref 4–5.7)

## 2020-11-17 PROCEDURE — G8754 DIAS BP LESS 90: HCPCS | Performed by: INTERNAL MEDICINE

## 2020-11-17 PROCEDURE — 99214 OFFICE O/P EST MOD 30 MIN: CPT | Performed by: INTERNAL MEDICINE

## 2020-11-17 PROCEDURE — G8510 SCR DEP NEG, NO PLAN REQD: HCPCS | Performed by: INTERNAL MEDICINE

## 2020-11-17 PROCEDURE — G8420 CALC BMI NORM PARAMETERS: HCPCS | Performed by: INTERNAL MEDICINE

## 2020-11-17 PROCEDURE — G8427 DOCREV CUR MEDS BY ELIG CLIN: HCPCS | Performed by: INTERNAL MEDICINE

## 2020-11-17 PROCEDURE — 83036 HEMOGLOBIN GLYCOSYLATED A1C: CPT | Performed by: INTERNAL MEDICINE

## 2020-11-17 PROCEDURE — 1101F PT FALLS ASSESS-DOCD LE1/YR: CPT | Performed by: INTERNAL MEDICINE

## 2020-11-17 PROCEDURE — G8536 NO DOC ELDER MAL SCRN: HCPCS | Performed by: INTERNAL MEDICINE

## 2020-11-17 PROCEDURE — G8752 SYS BP LESS 140: HCPCS | Performed by: INTERNAL MEDICINE

## 2020-11-17 NOTE — PROGRESS NOTES
Chief Complaint   Patient presents with    Hypertension     3 month follow up    Diabetes    Cholesterol Problem     Visit Vitals  BP (!) 146/90 (BP 1 Location: Left arm, BP Patient Position: Sitting)   Pulse 60   Temp 98.4 °F (36.9 °C) (Oral)   Resp 17   Ht 4' 11\" (1.499 m)   Wt 116 lb (52.6 kg)   SpO2 97%   BMI 23.43 kg/m²     1. Have you been to the ER, urgent care clinic since your last visit? Hospitalized since your last visit? No    2. Have you seen or consulted any other health care providers outside of the [address] [place] [address] since your last visit? Include any pap smears or colon screening.  No

## 2020-11-17 NOTE — PATIENT INSTRUCTIONS

## 2020-11-20 ENCOUNTER — LAB ONLY (OUTPATIENT)
Dept: INTERNAL MEDICINE CLINIC | Age: 80
End: 2020-11-20
Payer: MEDICARE

## 2020-11-20 DIAGNOSIS — I25.10 ASCVD (ARTERIOSCLEROTIC CARDIOVASCULAR DISEASE): ICD-10-CM

## 2020-11-20 DIAGNOSIS — I10 ESSENTIAL HYPERTENSION: Primary | ICD-10-CM

## 2020-11-20 DIAGNOSIS — E78.2 MIXED HYPERLIPIDEMIA: ICD-10-CM

## 2020-11-20 LAB
A-G RATIO,AGRAT: 1.3 RATIO
ALBUMIN SERPL-MCNC: 4.3 G/DL (ref 3.9–5.4)
ALP SERPL-CCNC: 74 U/L (ref 38–126)
ALT SERPL-CCNC: 23 U/L (ref 0–50)
ANION GAP SERPL CALC-SCNC: 8 MMOL/L
AST SERPL W P-5'-P-CCNC: 35 U/L (ref 14–36)
BILIRUB SERPL-MCNC: 1.1 MG/DL (ref 0.2–1.3)
BUN SERPL-MCNC: 14 MG/DL (ref 9–20)
BUN/CREATININE RATIO,BUCR: 20 RATIO
CALCIUM SERPL-MCNC: 10 MG/DL (ref 8.4–10.2)
CHLORIDE SERPL-SCNC: 103 MMOL/L (ref 98–107)
CHOL/HDL RATIO,CHHD: 3 RATIO (ref 0–4)
CHOLEST SERPL-MCNC: 223 MG/DL (ref 0–200)
CK SERPL-CCNC: 72 U/L (ref 30–135)
CO2 SERPL-SCNC: 30 MMOL/L (ref 22–32)
CREAT SERPL-MCNC: 0.7 MG/DL (ref 0.8–1.5)
GLOBULIN,GLOB: 3.2
GLUCOSE SERPL-MCNC: 68 MG/DL (ref 75–110)
HDLC SERPL-MCNC: 64 MG/DL (ref 35–130)
LDL/HDL RATIO,LDHD: 2 RATIO
LDLC SERPL CALC-MCNC: 142 MG/DL (ref 0–130)
POTASSIUM SERPL-SCNC: 4.6 MMOL/L (ref 3.6–5)
PROT SERPL-MCNC: 7.5 G/DL (ref 6.3–8.2)
SODIUM SERPL-SCNC: 141 MMOL/L (ref 137–145)
TRIGL SERPL-MCNC: 87 MG/DL (ref 0–200)
VLDLC SERPL CALC-MCNC: 17 MG/DL

## 2020-11-20 PROCEDURE — 80061 LIPID PANEL: CPT | Performed by: INTERNAL MEDICINE

## 2020-11-20 PROCEDURE — 80053 COMPREHEN METABOLIC PANEL: CPT | Performed by: INTERNAL MEDICINE

## 2020-11-20 PROCEDURE — 82550 ASSAY OF CK (CPK): CPT | Performed by: INTERNAL MEDICINE

## 2021-03-01 ENCOUNTER — IMMUNIZATION (OUTPATIENT)
Dept: FAMILY MEDICINE CLINIC | Age: 81
End: 2021-03-01

## 2021-03-01 DIAGNOSIS — Z23 ENCOUNTER FOR IMMUNIZATION: Primary | ICD-10-CM

## 2021-03-01 PROCEDURE — 0001A COVID-19, MRNA, LNP-S, PF, 30MCG/0.3ML DOSE(PFIZER): CPT | Performed by: FAMILY MEDICINE

## 2021-03-01 PROCEDURE — 91300 COVID-19, MRNA, LNP-S, PF, 30MCG/0.3ML DOSE(PFIZER): CPT | Performed by: FAMILY MEDICINE

## 2021-03-22 ENCOUNTER — IMMUNIZATION (OUTPATIENT)
Dept: FAMILY MEDICINE CLINIC | Age: 81
End: 2021-03-22

## 2021-03-22 DIAGNOSIS — Z23 ENCOUNTER FOR IMMUNIZATION: Primary | ICD-10-CM

## 2021-03-22 PROCEDURE — 0002A COVID-19, MRNA, LNP-S, PF, 30MCG/0.3ML DOSE(PFIZER): CPT | Performed by: FAMILY MEDICINE

## 2021-03-22 PROCEDURE — 91300 COVID-19, MRNA, LNP-S, PF, 30MCG/0.3ML DOSE(PFIZER): CPT | Performed by: FAMILY MEDICINE

## 2021-04-16 NOTE — PROGRESS NOTES
Chief Complaint   Patient presents with    Hypertension     3 month follow up    Diabetes    Cholesterol Problem       SUBJECTIVE:    Angie Guzman is a 79 y.o. male who returns in follow-up of his medical problems include hypertension, diabetes, hyperlipidemia, cardiomyopathy, ASCVD, DJD, anxiety and other medical problems. He would like some diabetic shoes as he feels like he has no numbness in his feet. He currently denies any chest pain, shortness of breath, palpitations, PND, orthopnea or other cardiac or respiratory complaints. He notes no current GI or  complaints. He notes no headaches, dizziness or neurologic complaints except a little numbness in his feet. He has known that he wants any medicine but he would like to diabetic shoes so he does not cause any blisters on his feet. No current arthritic complaints and remainder review of systems is negative. Current Outpatient Medications   Medication Sig Dispense Refill    metFORMIN (GLUCOPHAGE) 500 mg tablet TAKE 1 TABLET BY MOUTH TWICE A DAY WITH MEALS (Patient taking differently: One tablet daily) 180 Tab 3    metoprolol succinate (TOPROL-XL) 25 mg XL tablet TAKE 1 TAB BY MOUTH DAILY 90 Tab 3    ramipriL (ALTACE) 10 mg capsule Take 2 tablets daily 180 Cap 3    PURELAX 17 gram/dose powder MIX 17G IN WATER AND DRINK DAILY 850 g 5    omeprazole (PRILOSEC) 20 mg capsule TAKE ONE CAPSULE BY MOUTH ONCE DAILY (Patient taking differently: as needed. TAKE ONE CAPSULE BY MOUTH ONCE DAILY) 90 Cap 3    OTHER       Blood-Glucose Meter (TRUE METRIX GLUCOSE METER) misc Use daily as needed for diabetes management. Diagnosis E11.9 1 Each 0    glucose blood VI test strips (TRUE METRIX GLUCOSE TEST STRIP) strip Use 1 to 2 times daily as needed for Diabetes management. Diagnosis is 411.9. 50 Strip 5    glucose blood VI test strips (TRUETEST TEST STRIPS) strip by Does Not Apply route See Admin Instructions.  100 Strip prn    UBIDECARENONE (CO Q-10 PO) Take  by mouth.  OTHER Take 7 Tabs by mouth daily. HEART HEALTH FROM [employer]      aspirin 81 mg tablet Take 81 mg by mouth daily.  Indications: taking one a day       Past Medical History:   Diagnosis Date    Anxiety 8/5/2017    Arrhythmia     Arthritis     Atherosclerotic heart disease of native coronary artery without angina pectoris 8/5/2017    Bradycardia 8/5/2017    CAD (coronary artery disease)     Constipation, chronic 8/5/2017    Diabetes ([address]     diet controlled    Diabetes mellitus ([address] 8/5/2017    Diverticulosis 8/5/2017    ED (erectile dysfunction) 8/5/2017    GERD (gastroesophageal reflux disease)     Hypertension     Hypogonadism male 8/5/2017    Kidney stone     Neuropathy 8/5/2017    On statin therapy 8/5/2017    Right foot pain 8/5/2017     Past Surgical History:   Procedure Laterality Date    COLONOSCOPY N/A 6/21/2016    COLONOSCOPY performed by Oral Munoz MD at [address]; HI RISK IND  6/21/2016         HX GI      COLONOSCOPY    HX HEART CATHETERIZATION      HX OTHER SURGICAL      artificial testicle    HX PACEMAKER  10/6/15    CO CARDIAC SURG PROCEDURE UNLIST      cabg x4 vessels 1999    CO COLONOSCOPY FLX DX W/COLLJ SPEC WHEN PFRMD  4/8/2011         UPPER GI ENDOSCOPY,BIOPSY  12/14/2016          Allergies   Allergen Reactions    Caffeine Unknown (comments)     Sweating AND WOOZY AND CAN'T SLEEP    Codeine Other (comments)     Lightheaded, WOOZY AND CAN'T SLEEP    Percocet [Oxycodone-Acetaminophen] Nausea and Vomiting       REVIEW OF SYSTEMS:  General: negative for - chills or fever, or weight loss or gain  ENT: negative for - headaches, nasal congestion or tinnitus  Eyes: no blurred or visual changes  Neck: No stiffness or swollen nodes  Respiratory: negative for - cough, hemoptysis, shortness of breath or wheezing  Cardiovascular : negative for - chest pain, edema, palpitations or shortness of breath  Gastrointestinal: negative for - abdominal pain, blood in stools, heartburn or nausea/vomiting  Genito-Urinary: no dysuria, trouble voiding, or hematuria  Musculoskeletal: negative for - gait disturbance, joint pain, joint stiffness or joint swelling  Neurological: no TIA or stroke symptoms but some mild numbness in his feet  Hematologic: no bruises, no bleeding  Lymphatic: no swollen glands  Integument: no lumps, mole changes, nail changes or rash  Endocrine:no malaise/lethargy poly uria or polydipsia or unexpected weight changes        Social History     Socioeconomic History    Marital status:      Spouse name: Not on file    Number of children: Not on file    Years of education: Not on file    Highest education level: Not on file   Tobacco Use    Smoking status: Never Smoker    Smokeless tobacco: Never Used   Substance and Sexual Activity    Alcohol use: No    Drug use: No     Family History   Problem Relation Age of Onset    Heart Disease Mother     No Known Problems Father     Cancer Sister     Diabetes Brother     Anesth Problems Neg Hx        OBJECTIVE:     Visit Vitals  /74   Pulse 78   Temp 98.2 °F (36.8 °C) (Temporal)   Resp 17   Ht 4' 11\" (1.499 m)   Wt 115 lb (52.2 kg)   SpO2 97%   BMI 23.23 kg/m²     CONSTITUTIONAL:   well nourished, appears age appropriate  EYES: sclera anicteric, PERRL, EOMI  ENMT:nares clear, moist mucous membranes, pharynx clear  NECK: supple. Thyroid normal, No JVD or bruits  RESPIRATORY: Chest: clear to ascultation and percussion, normal inspiratory effort  CARDIOVASCULAR: Heart: regular rate and rhythm no murmurs, rubs or gallops, PMI not displaced, No thrills, no peripheral edema  GASTROINTESTINAL: Abdomen: non distended, soft, non tender, bowel sounds normal  HEMATOLOGIC: no purpura, petechiae or bruising  LYMPHATIC: No lymph node enlargemant  MUSCULOSKELETAL: Extremities: no active synovitis, pulse 1+.   No diabetic foot changes noted.  INTEGUMENT: No unusual rashes or suspicious skin lesions noted. Nails appear normal.  PERIPHERAL VASCULAR: normal pulses femoral, PT and DP  NEUROLOGIC: non-focal exam, A & O X 3. Normal distal sensation and proprioception all toes both feet. PSYCHIATRIC:, appropriate affect     ASSESSMENT:   1. Essential hypertension    2. Controlled type 2 diabetes mellitus with diabetic polyneuropathy, without long-term current use of insulin ([facility] [address]    3. Mixed hyperlipidemia    4. Cardiomyopathy, unspecified type ([address]    5. ASCVD (arteriosclerotic cardiovascular disease)    6. Primary osteoarthritis involving multiple joints    7. Anxiety      Impression  1. Hypertension that is controlled on recheck by me so continue current therapy reviewed with him. 2.  Diabetes mellitus repeat status pending a prior lab reviewed and I will make adjustments if necessary. I did fill out a diabetic shoe form for him. 3.  Hyperlipidemia prior lab reviewed repeat status pending I will adjust if needed. 4.  Cardiomyopathy that is stable  5. ASCVD clinically stable continue aspirin daily  6. DJD that is stable  7. Anxiety stable  I will call with lab and make further recommendations or adjustments if necessary. Follow-up scheduled for 3 months or sooner should it be a problem. PLAN:  .  Orders Placed This Encounter    METABOLIC PANEL, COMPREHENSIVE ([facility])    LIPID PANEL ([facility])    CK ([facility])    HEMOGLOBIN [facility]C W/O EAG [facility]         ATTENTION:   This medical record was transcribed using an electronic medical records system. Although proofread, it may and can contain electronic and spelling errors. Other human spelling and other errors may be present. Corrections may be executed at a later time. Please feel free to contact us for any clarifications as needed. Follow-up and Dispositions    · Return in about 3 months (around 7/19/2021).          No results found for any visits on 04/19/21. Tennis Pallas, MD    The patient verbalized understanding of the problems and plans as explained.

## 2021-04-19 ENCOUNTER — OFFICE VISIT (OUTPATIENT)
Dept: INTERNAL MEDICINE CLINIC | Age: 81
End: 2021-04-19
Payer: MEDICARE

## 2021-04-19 VITALS
OXYGEN SATURATION: 97 % | HEIGHT: 60 IN | TEMPERATURE: 98.2 F | BODY MASS INDEX: 22.58 KG/M2 | WEIGHT: 115 LBS | RESPIRATION RATE: 17 BRPM | HEART RATE: 78 BPM | DIASTOLIC BLOOD PRESSURE: 74 MMHG | SYSTOLIC BLOOD PRESSURE: 120 MMHG

## 2021-04-19 DIAGNOSIS — E78.2 MIXED HYPERLIPIDEMIA: ICD-10-CM

## 2021-04-19 DIAGNOSIS — M15.9 PRIMARY OSTEOARTHRITIS INVOLVING MULTIPLE JOINTS: ICD-10-CM

## 2021-04-19 DIAGNOSIS — F41.9 ANXIETY: ICD-10-CM

## 2021-04-19 DIAGNOSIS — I25.10 ASCVD (ARTERIOSCLEROTIC CARDIOVASCULAR DISEASE): ICD-10-CM

## 2021-04-19 DIAGNOSIS — I42.9 CARDIOMYOPATHY, UNSPECIFIED TYPE (HCC): ICD-10-CM

## 2021-04-19 DIAGNOSIS — E11.42 CONTROLLED TYPE 2 DIABETES MELLITUS WITH DIABETIC POLYNEUROPATHY, WITHOUT LONG-TERM CURRENT USE OF INSULIN (HCC): ICD-10-CM

## 2021-04-19 DIAGNOSIS — I10 ESSENTIAL HYPERTENSION: Primary | ICD-10-CM

## 2021-04-19 LAB
ALBUMIN SERPL-MCNC: 3.9 G/DL (ref 3.5–5)
ALBUMIN/GLOB SERPL: 1.3 {RATIO} (ref 1.1–2.2)
ALP SERPL-CCNC: 70 U/L (ref 45–117)
ALT SERPL-CCNC: 19 U/L (ref 12–78)
ANION GAP SERPL CALC-SCNC: 2 MMOL/L (ref 5–15)
AST SERPL-CCNC: 18 U/L (ref 15–37)
BILIRUB SERPL-MCNC: 0.9 MG/DL (ref 0.2–1)
BUN SERPL-MCNC: 15 MG/DL (ref 6–20)
BUN/CREAT SERPL: 21 (ref 12–20)
CALCIUM SERPL-MCNC: 9.6 MG/DL (ref 8.5–10.1)
CHLORIDE SERPL-SCNC: 107 MMOL/L (ref 97–108)
CHOLEST SERPL-MCNC: 242 MG/DL
CK SERPL-CCNC: 102 U/L (ref 39–308)
CO2 SERPL-SCNC: 31 MMOL/L (ref 21–32)
CREAT SERPL-MCNC: 0.7 MG/DL (ref 0.7–1.3)
EST. AVERAGE GLUCOSE BLD GHB EST-MCNC: 111 MG/DL
GLOBULIN SER CALC-MCNC: 3.1 G/DL (ref 2–4)
GLUCOSE SERPL-MCNC: 68 MG/DL (ref 65–100)
HBA1C MFR BLD: 5.5 % (ref 4–5.6)
HDLC SERPL-MCNC: 64 MG/DL
HDLC SERPL: 3.8 {RATIO} (ref 0–5)
LDLC SERPL CALC-MCNC: 165.4 MG/DL (ref 0–100)
LIPID PROFILE,FLP: ABNORMAL
POTASSIUM SERPL-SCNC: 4.3 MMOL/L (ref 3.5–5.1)
PROT SERPL-MCNC: 7 G/DL (ref 6.4–8.2)
SODIUM SERPL-SCNC: 140 MMOL/L (ref 136–145)
TRIGL SERPL-MCNC: 63 MG/DL (ref ?–150)
VLDLC SERPL CALC-MCNC: 12.6 MG/DL

## 2021-04-19 PROCEDURE — G8420 CALC BMI NORM PARAMETERS: HCPCS | Performed by: INTERNAL MEDICINE

## 2021-04-19 PROCEDURE — G8536 NO DOC ELDER MAL SCRN: HCPCS | Performed by: INTERNAL MEDICINE

## 2021-04-19 PROCEDURE — G8754 DIAS BP LESS 90: HCPCS | Performed by: INTERNAL MEDICINE

## 2021-04-19 PROCEDURE — 99214 OFFICE O/P EST MOD 30 MIN: CPT | Performed by: INTERNAL MEDICINE

## 2021-04-19 PROCEDURE — G8510 SCR DEP NEG, NO PLAN REQD: HCPCS | Performed by: INTERNAL MEDICINE

## 2021-04-19 PROCEDURE — G8752 SYS BP LESS 140: HCPCS | Performed by: INTERNAL MEDICINE

## 2021-04-19 PROCEDURE — 1101F PT FALLS ASSESS-DOCD LE1/YR: CPT | Performed by: INTERNAL MEDICINE

## 2021-04-19 PROCEDURE — G8427 DOCREV CUR MEDS BY ELIG CLIN: HCPCS | Performed by: INTERNAL MEDICINE

## 2021-04-19 NOTE — PROGRESS NOTES
Total cholesterol and LDL have gone up a little bit but still excellent HDL so watch diet. Of the lab okay.   Glyco is excellent

## 2021-04-19 NOTE — PROGRESS NOTES
Chief Complaint   Patient presents with    Hypertension     3 month follow up    Diabetes    Cholesterol Problem     Visit Vitals  BP (!) 152/96 (BP 1 Location: Left upper arm, BP Patient Position: Sitting, BP Cuff Size: Adult)   Pulse 78   Temp 98.2 °F (36.8 °C) (Temporal)   Resp 17   Ht 4' 11\" (1.499 m)   Wt 115 lb (52.2 kg)   SpO2 97%   BMI 23.23 kg/m²     1. Have you been to the ER, urgent care clinic since your last visit? Hospitalized since your last visit? No    2. Have you seen or consulted any other health care providers outside of the [address] [place] [address] since your last visit? Include any pap smears or colon screening.  No

## 2021-04-20 NOTE — PROGRESS NOTES
Total cholesterol and LDL have gone up a little bit but still excellent HDL so watch diet.  Of the lab okay.  Glyco is excellent. Letter generated to patient regarding.

## 2021-05-03 DIAGNOSIS — I10 ESSENTIAL HYPERTENSION: ICD-10-CM

## 2021-05-03 RX ORDER — METOPROLOL SUCCINATE 25 MG/1
TABLET, EXTENDED RELEASE ORAL
Qty: 90 TAB | Refills: 3 | Status: SHIPPED | OUTPATIENT
Start: 2021-05-03 | End: 2022-05-02

## 2021-05-03 RX ORDER — RAMIPRIL 10 MG/1
CAPSULE ORAL
Qty: 180 CAP | Refills: 3 | Status: SHIPPED | OUTPATIENT
Start: 2021-05-03 | End: 2022-05-02

## 2021-05-03 NOTE — TELEPHONE ENCOUNTER
RX refill request from the patient/pharmacy. Patient last seen 04- with labs, and next appt. scheduled for 07-  Requested Prescriptions     Pending Prescriptions Disp Refills    ramipriL (ALTACE) 10 mg capsule [Pharmacy Med Name: RAMIPRIL 10 MG CAPSULE] 180 Cap 3     Sig: TAKE 2 TABLETS BY MOUTH EVERY DAY    metoprolol succinate (TOPROL-XL) 25 mg XL tablet [Pharmacy Med Name: METOPROLOL SUCC ER 25 MG TAB] 90 Tab 3     Sig: TAKE 1 TABLET BY MOUTH EVERY DAY   .

## 2021-07-23 NOTE — PROGRESS NOTES
Chief Complaint   Patient presents with    Diabetes     3 month follow up    Hypertension       SUBJECTIVE:    Ladi Cordero is a 79 y.o. male who returns in follow-up for his medical problems include hypertension, diabetes, hyperlipidemia, ASCVD, DJD and other multiple medical problems. He is taking his medications and trying to follow his diet and remain physically active. He currently denies any chest pain, shortness of breath, palpitations, PND, orthopnea or other cardiac respiratory complaints. He notes no GI or  complaints. He notes no headaches, dizziness or neurologic complaints. He has no current active arthritic complaints and there are no other complaints on complete review of systems. Current Outpatient Medications   Medication Sig Dispense Refill    Blood-Glucose Meter (True Metrix Glucose Meter) misc Use daily as needed for diabetes management. Diagnosis E11.9 1 Each 0    glucose blood VI test strips (True Metrix Glucose Test Strip) strip Use 1 to 2 times daily as needed for Diabetes management. Diagnosis is 411.9. 50 Strip 5    ramipriL (ALTACE) 10 mg capsule TAKE 2 TABLETS BY MOUTH EVERY  Cap 3    metoprolol succinate (TOPROL-XL) 25 mg XL tablet TAKE 1 TABLET BY MOUTH EVERY DAY 90 Tab 3    metFORMIN (GLUCOPHAGE) 500 mg tablet TAKE 1 TABLET BY MOUTH TWICE A DAY WITH MEALS 180 Tab 3    PURELAX 17 gram/dose powder MIX 17G IN WATER AND DRINK DAILY 850 g 5    omeprazole (PRILOSEC) 20 mg capsule TAKE ONE CAPSULE BY MOUTH ONCE DAILY (Patient taking differently: as needed. TAKE ONE CAPSULE BY MOUTH ONCE DAILY) 90 Cap 3    OTHER       UBIDECARENONE (CO Q-10 PO) Take  by mouth.  OTHER Take 7 Tabs by mouth daily. [facility] FROM [employer]      aspirin 81 mg tablet Take 81 mg by mouth daily. Indications: taking one a day      fluoride, sodium, 1.1 % pste BRUSH WITH A PEA SIZED AMOUNT FOR 2 MINUTES BEFORE BEDTIME. DO NOT RINSE/DRINK/EAT AFTER BRUSHING.       glucose blood VI test strips (TRUETEST TEST STRIPS) strip by Does Not Apply route See Admin Instructions.  (Patient not taking: Reported on 7/26/2021) 100 Strip prn     Past Medical History:   Diagnosis Date    Anxiety 8/5/2017    Arrhythmia     Arthritis     Atherosclerotic heart disease of native coronary artery without angina pectoris 8/5/2017    Bradycardia 8/5/2017    CAD (coronary artery disease)     Constipation, chronic 8/5/2017    Diabetes ([address]     diet controlled    Diabetes mellitus ([address] 8/5/2017    Diverticulosis 8/5/2017    ED (erectile dysfunction) 8/5/2017    GERD (gastroesophageal reflux disease)     Hypertension     Hypogonadism male 8/5/2017    Kidney stone     Neuropathy 8/5/2017    On statin therapy 8/5/2017    Right foot pain 8/5/2017     Past Surgical History:   Procedure Laterality Date    COLONOSCOPY N/A 6/21/2016    COLONOSCOPY performed by Jas Rosenberg MD at [facility]; HI RISK IND  6/21/2016         HX GI      COLONOSCOPY    HX HEART CATHETERIZATION      HX OTHER SURGICAL      artificial testicle    HX PACEMAKER  10/6/15    MD CARDIAC SURG PROCEDURE UNLIST      cabg x4 vessels 1999    MD COLONOSCOPY FLX DX W/COLLJ SPEC WHEN PFRMD  4/8/2011         UPPER GI ENDOSCOPY,BIOPSY  12/14/2016          Allergies   Allergen Reactions    Caffeine Unknown (comments)     Sweating AND WOOZY AND CAN'T SLEEP    Codeine Other (comments)     Lightheaded, WOOZY AND CAN'T SLEEP    Percocet [Oxycodone-Acetaminophen] Nausea and Vomiting       REVIEW OF SYSTEMS:  General: negative for - chills or fever, or weight loss or gain  ENT: negative for - headaches, nasal congestion or tinnitus  Eyes: no blurred or visual changes  Neck: No stiffness or swollen nodes  Respiratory: negative for - cough, hemoptysis, shortness of breath or wheezing  Cardiovascular : negative for - chest pain, edema, palpitations or shortness of breath  Gastrointestinal: negative for - abdominal pain, blood in stools, heartburn or nausea/vomiting  Genito-Urinary: no dysuria, trouble voiding, or hematuria  Musculoskeletal: negative for - gait disturbance, joint pain, joint stiffness or joint swelling  Neurological: no TIA or stroke symptoms  Hematologic: no bruises, no bleeding  Lymphatic: no swollen glands  Integument: no lumps, mole changes, nail changes or rash  Endocrine:no malaise/lethargy poly uria or polydipsia or unexpected weight changes        Social History     Socioeconomic History    Marital status:      Spouse name: Not on file    Number of children: Not on file    Years of education: Not on file    Highest education level: Not on file   Tobacco Use    Smoking status: Never Smoker    Smokeless tobacco: Never Used   Vaping Use    Vaping Use: Never used   Substance and Sexual Activity    Alcohol use: No    Drug use: No     Social Determinants of Health     Financial Resource Strain:     Difficulty of Paying Living Expenses:    Food Insecurity:     Worried About Running Out of Food in the Last Year:     Ran Out of Food in the Last Year:    Transportation Needs:     Lack of Transportation (Medical):      Lack of Transportation (Non-Medical):    Physical Activity:     Days of Exercise per Week:     Minutes of Exercise per Session:    Stress:     Feeling of Stress :    Social Connections:     Frequency of Communication with Friends and Family:     Frequency of Social Gatherings with Friends and Family:     Attends Hinduism Services:     Active Member of Clubs or Organizations:     Attends Club or Organization Meetings:     Marital Status:      Family History   Problem Relation Age of Onset    Heart Disease Mother     No Known Problems Father     Cancer Sister     Diabetes Brother     Anesth Problems Neg Hx        OBJECTIVE:     Visit Vitals  /78 (BP 1 Location: Right arm, BP Patient Position: Sitting, BP Cuff Size: Adult)   Pulse 60   Temp 98.2 °F (36.8 °C) (Oral)   Resp 16   Ht 4' 11\" (1.499 m)   Wt 115 lb (52.2 kg)   SpO2 97%   BMI 23.23 kg/m²     CONSTITUTIONAL:   well nourished, appears age appropriate  EYES: sclera anicteric, PERRL, EOMI  ENMT:nares clear, moist mucous membranes, pharynx clear  NECK: supple. Thyroid normal, No JVD or bruits  RESPIRATORY: Chest: clear to ascultation and percussion, normal inspiratory effort  CARDIOVASCULAR: Heart: regular rate and rhythm no murmurs, rubs or gallops, PMI not displaced, No thrills, no peripheral edema  GASTROINTESTINAL: Abdomen: non distended, soft, non tender, bowel sounds normal  HEMATOLOGIC: no purpura, petechiae or bruising  LYMPHATIC: No lymph node enlargemant  MUSCULOSKELETAL: Extremities: no active synovitis, pulse 1+   INTEGUMENT: No unusual rashes or suspicious skin lesions noted. Nails appear normal.  PERIPHERAL VASCULAR: normal pulses femoral, PT and DP  NEUROLOGIC: non-focal exam, A & O X 3  PSYCHIATRIC:, appropriate affect     ASSESSMENT:   1. Essential hypertension    2. Controlled type 2 diabetes mellitus with diabetic polyneuropathy, without long-term current use of insulin ([address]    3. Mixed hyperlipidemia    4. Cardiomyopathy, unspecified type ([address]    5. ASCVD (arteriosclerotic cardiovascular disease)    6. Primary osteoarthritis involving multiple joints    7. Anxiety      Impression  1. Hypertension that is controlled so continue current therapy reviewed with him. 2.  Diabetes repeat status pending and prior lab reviewed not make adjustments if necessary. 3.  Hyperlipidemia prior lab reviewed and repeat status pending I will adjust if needed. 4.  Cardiomyopathy that is currently stable   5. ASCVD clinically stable continue aspirin daily  6. DJD that is stable  7. Anxiety that is stable  8. I filled out a form for diabetic shoes for evaluation and treatment of his bunions. I will recheck him again in 3 months or sooner if there is a problem.   I will call with lab results in the interim. PLAN:  .  Orders Placed This Encounter    METABOLIC PANEL, COMPREHENSIVE    LIPID PANEL    CK    HEMOGLOBIN A1C WITH EAG    fluoride, sodium, 1.1 % pste    Blood-Glucose Meter (True Metrix Glucose Meter) misc    glucose blood VI test strips (True Metrix Glucose Test Strip) strip         ATTENTION:   This medical record was transcribed using an electronic medical records system. Although proofread, it may and can contain electronic and spelling errors. Other human spelling and other errors may be present. Corrections may be executed at a later time. Please feel free to contact us for any clarifications as needed. Follow-up and Dispositions    · Return in about 3 months (around 10/26/2021). No results found for any visits on 07/26/21. Ranjana Day MD    The patient verbalized understanding of the problems and plans as explained.

## 2021-07-26 ENCOUNTER — OFFICE VISIT (OUTPATIENT)
Dept: INTERNAL MEDICINE CLINIC | Age: 81
End: 2021-07-26
Payer: MEDICARE

## 2021-07-26 VITALS
OXYGEN SATURATION: 97 % | WEIGHT: 115 LBS | BODY MASS INDEX: 22.58 KG/M2 | DIASTOLIC BLOOD PRESSURE: 78 MMHG | RESPIRATION RATE: 16 BRPM | HEART RATE: 60 BPM | SYSTOLIC BLOOD PRESSURE: 122 MMHG | TEMPERATURE: 98.2 F | HEIGHT: 60 IN

## 2021-07-26 DIAGNOSIS — E78.2 MIXED HYPERLIPIDEMIA: ICD-10-CM

## 2021-07-26 DIAGNOSIS — M15.9 PRIMARY OSTEOARTHRITIS INVOLVING MULTIPLE JOINTS: ICD-10-CM

## 2021-07-26 DIAGNOSIS — F41.9 ANXIETY: ICD-10-CM

## 2021-07-26 DIAGNOSIS — I42.9 CARDIOMYOPATHY, UNSPECIFIED TYPE (HCC): ICD-10-CM

## 2021-07-26 DIAGNOSIS — I25.10 ASCVD (ARTERIOSCLEROTIC CARDIOVASCULAR DISEASE): ICD-10-CM

## 2021-07-26 DIAGNOSIS — E11.42 CONTROLLED TYPE 2 DIABETES MELLITUS WITH DIABETIC POLYNEUROPATHY, WITHOUT LONG-TERM CURRENT USE OF INSULIN (HCC): ICD-10-CM

## 2021-07-26 DIAGNOSIS — I10 ESSENTIAL HYPERTENSION: Primary | ICD-10-CM

## 2021-07-26 LAB
ALBUMIN SERPL-MCNC: 3.9 G/DL (ref 3.5–5)
ALBUMIN/GLOB SERPL: 1.1 {RATIO} (ref 1.1–2.2)
ALP SERPL-CCNC: 83 U/L (ref 45–117)
ALT SERPL-CCNC: 32 U/L (ref 12–78)
ANION GAP SERPL CALC-SCNC: 4 MMOL/L (ref 5–15)
AST SERPL-CCNC: 26 U/L (ref 15–37)
BILIRUB SERPL-MCNC: 0.7 MG/DL (ref 0.2–1)
BUN SERPL-MCNC: 13 MG/DL (ref 6–20)
BUN/CREAT SERPL: 16 (ref 12–20)
CALCIUM SERPL-MCNC: 10.1 MG/DL (ref 8.5–10.1)
CHLORIDE SERPL-SCNC: 108 MMOL/L (ref 97–108)
CHOLEST SERPL-MCNC: 252 MG/DL
CK SERPL-CCNC: 110 U/L (ref 39–308)
CO2 SERPL-SCNC: 31 MMOL/L (ref 21–32)
CREAT SERPL-MCNC: 0.79 MG/DL (ref 0.7–1.3)
EST. AVERAGE GLUCOSE BLD GHB EST-MCNC: 114 MG/DL
GLOBULIN SER CALC-MCNC: 3.5 G/DL (ref 2–4)
GLUCOSE SERPL-MCNC: 82 MG/DL (ref 65–100)
HBA1C MFR BLD: 5.6 % (ref 4–5.6)
HDLC SERPL-MCNC: 68 MG/DL
HDLC SERPL: 3.7 {RATIO} (ref 0–5)
LDLC SERPL CALC-MCNC: 170 MG/DL (ref 0–100)
POTASSIUM SERPL-SCNC: 5.4 MMOL/L (ref 3.5–5.1)
PROT SERPL-MCNC: 7.4 G/DL (ref 6.4–8.2)
SODIUM SERPL-SCNC: 143 MMOL/L (ref 136–145)
TRIGL SERPL-MCNC: 70 MG/DL (ref ?–150)
VLDLC SERPL CALC-MCNC: 14 MG/DL

## 2021-07-26 PROCEDURE — 99214 OFFICE O/P EST MOD 30 MIN: CPT | Performed by: INTERNAL MEDICINE

## 2021-07-26 PROCEDURE — 1101F PT FALLS ASSESS-DOCD LE1/YR: CPT | Performed by: INTERNAL MEDICINE

## 2021-07-26 PROCEDURE — G8536 NO DOC ELDER MAL SCRN: HCPCS | Performed by: INTERNAL MEDICINE

## 2021-07-26 PROCEDURE — G8420 CALC BMI NORM PARAMETERS: HCPCS | Performed by: INTERNAL MEDICINE

## 2021-07-26 PROCEDURE — G8510 SCR DEP NEG, NO PLAN REQD: HCPCS | Performed by: INTERNAL MEDICINE

## 2021-07-26 PROCEDURE — G8752 SYS BP LESS 140: HCPCS | Performed by: INTERNAL MEDICINE

## 2021-07-26 PROCEDURE — G8427 DOCREV CUR MEDS BY ELIG CLIN: HCPCS | Performed by: INTERNAL MEDICINE

## 2021-07-26 PROCEDURE — G8754 DIAS BP LESS 90: HCPCS | Performed by: INTERNAL MEDICINE

## 2021-07-26 RX ORDER — SODIUM FLUORIDE 6 MG/ML
PASTE, DENTIFRICE DENTAL
COMMUNITY
Start: 2021-05-05

## 2021-07-26 RX ORDER — CALCIUM CITRATE/VITAMIN D3 200MG-6.25
TABLET ORAL
Qty: 50 STRIP | Refills: 5 | Status: SHIPPED | OUTPATIENT
Start: 2021-07-26

## 2021-07-26 RX ORDER — BLOOD-GLUCOSE METER
EACH MISCELLANEOUS
Qty: 1 EACH | Refills: 0 | Status: SHIPPED | OUTPATIENT
Start: 2021-07-26

## 2021-07-26 NOTE — PATIENT INSTRUCTIONS
Learning About ACE Inhibitors and ARBs for Diabetes  Introduction     ACE inhibitors and ARBs are medicines used to control blood pressure. They allow blood vessels to relax and open up. This lowers your blood pressure. When you have diabetes, taking an ACE inhibitor or ARB can help to:  · Treat high blood pressure. Your risk of problems from diabetes goes up when you have high blood pressure. · Prevent or slow kidney damage. Diabetes can damage the blood vessels in the kidneys. High blood pressure can damage the kidneys, too. · Lower the risks of stroke and heart attack. Your risks go up when you have high blood pressure, heart disease, or both. An ACE inhibitor or ARB is a good choice for people with diabetes. Unlike some medicines, these don't affect blood sugar levels. Examples  ACE inhibitors include:  · Benazepril. · Lisinopril. · Ramipril. ARBs include:  · Irbesartan. · Losartan. · Telmisartan. Possible side effects  All medicines can cause side effects. Some side effects of ACE inhibitors include:  · Low blood pressure. You may feel dizzy and weak. · A cough. · High potassium levels. · Swelling of your lips, tongue, or face. If the swelling is severe, you may need treatment right away. Severe swelling can make it hard to breathe, but this is rare. Some side effects of ARBs include:  · Low blood pressure. You may feel dizzy and weak. · High potassium levels. You may have other side effects or reactions not listed here. Check the information that comes with your medicine. What to know about taking this medicine  · Be safe with medicines. Take your medicines exactly as prescribed. Call your doctor if you think you are having a problem with your medicine. · Before starting an ACE inhibitor or ARB, tell your doctor if you:  ? Use a salt substitute. ? Take diuretics or potassium tablets. · These medicines are not safe for pregnancy.  If you are pregnant or planning to be, talk to your doctor about a safe blood pressure medicine. · ACE inhibitors can cause a dry cough. If the cough is bad, talk to your doctor. Switching to an ARB is likely to help. · Taking some medicines together can cause problems. Tell your doctor or pharmacist all the medicines you take. This includes over-the-counter medicines, vitamins, herbal products, and supplements. · You may need regular blood and urine tests. Where can you learn more? Go to http://www.[employer].com/  Enter M316 in the search box to learn more about \"Learning About ACE Inhibitors and ARBs for Diabetes. \"  Current as of: August 31, 2020               Content Version: 12.8  © 0846-0936 [employer]. Care instructions adapted under license by [employer] (which disclaims liability or warranty for this information). If you have questions about a medical condition or this instruction, always ask your healthcare professional. [address] any warranty or liability for your use of this information.

## 2021-07-26 NOTE — PROGRESS NOTES
Identified pt with two pt identifiers(name and ). Reviewed record in preparation for visit and have obtained necessary documentation. Chief Complaint   Patient presents with    Diabetes     3 month follow up    Hypertension        Vitals:    21 1002 21 1005   BP: (!) 160/88 122/78   Pulse: 60    Resp: 16    Temp: 98.2 °F (36.8 °C)    TempSrc: Oral    SpO2: 97%    Weight: 115 lb (52.2 kg)    Height: 4' 11\" (1.499 m)    PainSc:   0 - No pain        Health Maintenance Due   Topic    DTaP/Tdap/Td series (1 - Tdap)    Shingrix Vaccine Age 50> (1 of 2)    MICROALBUMIN Q1     Medicare Yearly Exam        Coordination of Care Questionnaire:  :   1) Have you been to an emergency room, urgent care, or hospitalized since your last visit? If yes, where when, and reason for visit? no       2. Have seen or consulted any other health care provider since your last visit? If yes, where when, and reason for visit? NO      Patient is accompanied by self I have received verbal consent from Rj Araiza to discuss any/all medical information while they are present in the room. Patient requesting new blood glucose testing machine.  Current machine does not work

## 2021-09-14 PROBLEM — Z12.5 PROSTATE CANCER SCREENING: Status: ACTIVE | Noted: 2021-09-14

## 2021-09-14 NOTE — PROGRESS NOTES
This is a Subsequent Medicare Annual Wellness Visit providing Personalized Prevention Plan Services (PPPS) (Performed 12 months after initial AWV and PPPS )    I have reviewed the patient's medical history in detail and updated the computerized patient record. He presents today for his Medicare subsequent annual wellness examination and screening questionnaire. He is also in follow-up of his multiple medical problems include hypertension tension, diabetes, hyperlipidemia, cardiomyopathy, ASCVD, diverticulosis, DJD, hypogonadism, anxiety and other multiple medical problems. He is taking his medications and trying to follow his diet and remains physically active. He currently denies any chest pain, shortness of breath, palpitations, PND, orthopnea or other cardiac or respiratory complaints. He notes no current GI or  complaints. He notes no headaches, dizziness or neurologic complaints. He has no current active arthritic complaints and he has no other complaints on complete review of systems.     History     Past Medical History:   Diagnosis Date    Anxiety 8/5/2017    Arrhythmia     Arthritis     Atherosclerotic heart disease of native coronary artery without angina pectoris 8/5/2017    Bradycardia 8/5/2017    CAD (coronary artery disease)     Constipation, chronic 8/5/2017    Diabetes ([facility] [address]     diet controlled    Diabetes mellitus ([address] 8/5/2017    Diverticulosis 8/5/2017    ED (erectile dysfunction) 8/5/2017    GERD (gastroesophageal reflux disease)     Hypertension     Hypogonadism male 8/5/2017    Kidney stone     Neuropathy 8/5/2017    On statin therapy 8/5/2017    Right foot pain 8/5/2017      Past Surgical History:   Procedure Laterality Date    COLONOSCOPY N/A 6/21/2016    COLONOSCOPY performed by Sahra Sierra MD at [facility]; HI RISK IND  6/21/2016         HX GI      COLONOSCOPY    HX HEART CATHETERIZATION      HX OTHER SURGICAL artificial testicle    HX PACEMAKER  10/6/15    CT CARDIAC SURG PROCEDURE UNLIST      cabg x4 vessels 1999    CT COLONOSCOPY FLX DX W/COLLJ SPEC WHEN PFRMD  4/8/2011         UPPER GI ENDOSCOPY,BIOPSY  12/14/2016          Social History     Tobacco Use    Smoking status: Never Smoker    Smokeless tobacco: Never Used   Vaping Use    Vaping Use: Never used   Substance Use Topics    Alcohol use: No    Drug use: No     Current Outpatient Medications   Medication Sig Dispense Refill    fluoride, sodium, 1.1 % pste BRUSH WITH A PEA SIZED AMOUNT FOR 2 MINUTES BEFORE BEDTIME. DO NOT RINSE/DRINK/EAT AFTER BRUSHING.  Blood-Glucose Meter (True Metrix Glucose Meter) misc Use daily as needed for diabetes management. Diagnosis E11.9 1 Each 0    glucose blood VI test strips (True Metrix Glucose Test Strip) strip Use 1 to 2 times daily as needed for Diabetes management. Diagnosis is 411.9. 50 Strip 5    ramipriL (ALTACE) 10 mg capsule TAKE 2 TABLETS BY MOUTH EVERY  Cap 3    metoprolol succinate (TOPROL-XL) 25 mg XL tablet TAKE 1 TABLET BY MOUTH EVERY DAY 90 Tab 3    metFORMIN (GLUCOPHAGE) 500 mg tablet TAKE 1 TABLET BY MOUTH TWICE A DAY WITH MEALS 180 Tab 3    PURELAX 17 gram/dose powder MIX 17G IN WATER AND DRINK DAILY 850 g 5    omeprazole (PRILOSEC) 20 mg capsule TAKE ONE CAPSULE BY MOUTH ONCE DAILY (Patient taking differently: as needed. TAKE ONE CAPSULE BY MOUTH ONCE DAILY) 90 Cap 3    OTHER       glucose blood VI test strips (TRUETEST TEST STRIPS) strip by Does Not Apply route See Admin Instructions. 100 Strip prn    UBIDECARENONE (CO Q-10 PO) Take  by mouth.  OTHER Take 7 Tabs by mouth daily. HEART HEALTH FROM [employer]      aspirin 81 mg tablet Take 81 mg by mouth daily.  Indications: taking one a day       Allergies   Allergen Reactions    Caffeine Unknown (comments)     Sweating AND WOOZY AND CAN'T SLEEP    Codeine Other (comments)     Lightheaded, WOOZY AND CAN'T SLEEP    Percocet [Oxycodone-Acetaminophen] Nausea and Vomiting     Family History   Problem Relation Age of Onset    Heart Disease Mother     No Known Problems Father     Cancer Sister     Diabetes Brother     Anesth Problems Neg Hx        Patient Active Problem List    Diagnosis    Controlled type 2 diabetes mellitus with diabetic polyneuropathy, without long-term current use of insulin ([facility] [address]    Cardiomyopathy ([facility] [address]    ASCVD (arteriosclerotic cardiovascular disease)    Essential hypertension    Mixed hyperlipidemia    Primary osteoarthritis involving multiple joints    Anxiety    Prostate cancer screening    Alcohol screening    Subconjunctival hemorrhage of right eye    Chest pain    Neck pain    Pain of right hand    Epigastric pain    Medicare annual wellness visit, subsequent    Diverticulosis    Neuropathy    Hypogonadism male    ED (erectile dysfunction)    Constipation, chronic    Bradycardia       Patient Care Team:  Sera Heck MD as PCP - General (Internal Medicine)  Sera Heck MD as PCP - [facility] Empaneled Provider    Depression Risk Factor Screening:     3 most recent PHQ Screens 9/15/2021   Little interest or pleasure in doing things Not at all   Feeling down, depressed, irritable, or hopeless Not at all   Total Score PHQ 2 0     Alcohol Risk Factor Screening: You do not drink alcohol or very rarely. Functional Ability and Level of Safety:     Fall Risk     Fall Risk Assessment, last 12 mths 9/15/2021   Able to walk? Yes   Fall in past 12 months? 0   Do you feel unsteady? 1   Are you worried about falling 1   Is TUG test greater than 12 seconds? 0   Is the gait abnormal? 0       Hearing Loss   mild    Activities of Daily Living   Self-care.    ADL Assessment 9/15/2021   Feeding yourself No Help Needed   Getting from bed to chair No Help Needed   Getting dressed No Help Needed   Bathing or showering No Help Needed   Walk across the room (includes cane/walker) No Help Needed Using the telphone No Help Needed   Taking your medications No Help Needed   Preparing meals No Help Needed   Managing money (expenses/bills) No Help Needed   Moderately strenuous housework (laundry) No Help Needed   Shopping for personal items (toiletries/medicines) No Help Needed   Shopping for groceries No Help Needed   Driving No Help Needed   Climbing a flight of stairs No Help Needed   Getting to places beyond walking distances No Help Needed       Abuse Screen   Patient is not abused    Social History     Social History Narrative    Not on file       Review of Systems      ROS:    Constitutional: He denies fevers, weight loss, sweats. Eyes: No blurred or double vision. ENT: No difficulty with swallowing, taste, speech or smell. Neck: no stiffness or swelling  Respiratory: No cough wheezing or shortness of breath. Cardiovascular: Denies chest pain, palpitations, unexplained indigestion or syncope. Gastrointestinal:  No changes in bowel movements, no abdominal pain, no bloating. Genitourinary:  He denies frequency, nocturia or stranguria. Extremities: No joint pain, stiffness or swelling. Neurological:  No numbness, tingling, burring paresthesias or loss of motor strength. No syncope, dizziness or frequent headache  Lymphatic: no adenopathy noted  Hematologic: no easy bruising or bleeding gums  Skin:  No recent rashes or mole changes. Psychiatric/Behavioral:  Negative for depression.       Physical Examination     Evaluation of Cognitive Function:  Mood/affect:  happy  Appearance: age appropriate  Family member/caregiver input: None    Vitals:    09/15/21 1034   BP: 118/76   Pulse: 60   Resp: 17   Temp: 98.5 °F (36.9 °C)   TempSrc: Oral   SpO2: 99%   Weight: 116 lb 8 oz (52.8 kg)   Height: 4' 11\" (1.499 m)   PainSc:   0 - No pain        PHYSICAL EXAM:    General appearance - alert, well appearing, and in no distress  Mental status - alert, oriented to person, place, and time  HEENT:  Ears - bilateral TM's and external ear canals clear  Eyes - pupillary responses were normal.  Extraocular muscle function intact. Lids and conjunctiva not injected. Fundoscopic exam revealed sharp disc margins. eye movements intact  Pharynx- clear with teeth in good repair. No masses were noted  Neck - supple without thyromegaly or burit. No JVD noted  Lungs - clear to auscultation and percussion  Cardiac- normal rate, regular rhythm without murmurs. PMI not displaced. No gallop, rub or click  Abdomen - flat, soft, non-tender without palpable organomegaly or mass. No pulsatile mass was felt, and not bruit was heard. Bowel sounds were active  : Circumcised, Testes descended w/o masses  Rectal: normal sphincter tone, prostate 1+ enlarged, smooth and nontender without nodules, no masses, stool brown and hemacult negative  Extremities -  no clubbing cyanosis or edema  Lymphatics - no palpable lymphadenopathy, no hepatosplenomegaly  Hematologic: no petechiae or purpura  Peripheral vascular -Femoral, Dorsalis pedis and posterior tibial pulses felt without difficulty  Skin - no rash or unusual mole change noted  Neurological - Cranial nerves II-XII grossly intact. Motor strength 5/5. DTR's 2+ and symmetric.   Station and gait normal  Back exam - full range of motion, no tenderness, palpable spasm or pain on motion  Musculoskeletal - no joint tenderness, deformity or swelling        Results for orders placed or performed in visit on 07/26/21   HEMOGLOBIN A1C WITH EAG   Result Value Ref Range    Hemoglobin A1c 5.6 4.0 - 5.6 %    Est. average glucose 114 mg/dL   CK   Result Value Ref Range     39 - 308 U/L   LIPID PANEL   Result Value Ref Range    Cholesterol, total 252 (H) <200 MG/DL    Triglyceride 70 <150 MG/DL    HDL Cholesterol 68 MG/DL    LDL, calculated 170 (H) 0 - 100 MG/DL    VLDL, calculated 14 MG/DL    CHOL/HDL Ratio 3.7 0.0 - 5.0     METABOLIC PANEL, COMPREHENSIVE   Result Value Ref Range    Sodium 143 136 - 145 mmol/L    Potassium 5.4 (H) 3.5 - 5.1 mmol/L    Chloride 108 97 - 108 mmol/L    CO2 31 21 - 32 mmol/L    Anion gap 4 (L) 5 - 15 mmol/L    Glucose 82 65 - 100 mg/dL    BUN 13 6 - 20 MG/DL    Creatinine 0.79 0.70 - 1.30 MG/DL    BUN/Creatinine ratio 16 12 - 20      GFR est AA >60 >60 ml/min/1.73m2    GFR est non-AA >60 >60 ml/min/1.73m2    Calcium 10.1 8.5 - 10.1 MG/DL    Bilirubin, total 0.7 0.2 - 1.0 MG/DL    ALT (SGPT) 32 12 - 78 U/L    AST (SGOT) 26 15 - 37 U/L    Alk. phosphatase 83 45 - 117 U/L    Protein, total 7.4 6.4 - 8.2 g/dL    Albumin 3.9 3.5 - 5.0 g/dL    Globulin 3.5 2.0 - 4.0 g/dL    A-G Ratio 1.1 1.1 - 2.2         Advice/Referrals/Counseling   Education and counseling provided:  Are appropriate based on today's review and evaluation  End-of-Life planning (with patient's consent)  Pneumococcal Vaccine  Influenza Vaccine  Colorectal cancer screening tests      Assessment/Plan     ASSESSMENT:   1. Essential hypertension    2. Controlled type 2 diabetes mellitus with diabetic polyneuropathy, without long-term current use of insulin ([facility] [address]    3. Mixed hyperlipidemia    4. Cardiomyopathy, unspecified type ([facility] [address]    5. ASCVD (arteriosclerotic cardiovascular disease)    6. Primary osteoarthritis involving multiple joints    7. Anxiety    8. Diverticulosis    9. Prostate cancer screening    10. Alcohol screening    11. Medicare annual wellness visit, subsequent      Impression  1. Hypertension that is controlled so continue current therapy reviewed with him. 2.  Diabetes repeat status pending and prior lab review not make adjustments if necessary. 3.  Hyperlipidemia prior lab reviewed repeat status pending I will adjust if needed. 4.  Cardiomyopathy clinically stable and EKG obtained today reveals no acute process. 5.  ASCVD continue aspirin daily  6. DJD that is stable  7. Anxiety that is stable  8. Diverticulosis no recent symptoms  9.   Prostate cancer screening done today with PSA pending  10. Annual alcohol screening is done and we did discuss males with more than 2 drinks per day with increased cardiovascular risk and increased risk of liver disease and other GI effects. We did spend 5 minutes discussing alcohol complications and necessity to remain alcohol free. He himself does remain alcohol free. Medicare subsequent annual wellness examination screening questionnaires completed today. The results were reviewed with him and his questions were answered. Lifestyle recommendations modifications discussed and made. I will call with lab results and make further recommendations or adjustments if necessary. Follow-up in 3 months or sooner if there is a problem. PLAN:  .  Orders Placed This Encounter    CBC WITH AUTOMATED DIFF    METABOLIC PANEL, COMPREHENSIVE    LIPID PANEL    CK    PSA SCREENING (SCREENING)    T4 (THYROXINE)    TSH 3RD GENERATION    URINALYSIS W/ REFLEX CULTURE    AMB POC URINE, MICROALBUMIN, SEMIQUANT (1 RESULT)    AMB POC EKG ROUTINE W/ 12 LEADS, INTER & REP         ATTENTION:   This medical record was transcribed using an electronic medical records system. Although proofread, it may and can contain electronic and spelling errors. Other human spelling and other errors may be present. Corrections may be executed at a later time. Please feel free to contact us for any clarifications as needed. Follow-up and Dispositions    · Return in about 3 months (around 12/15/2021). Catarino Mota MD    Recommended healthy diet low in carbohydrates, fats, sodium and cholesterol. Recommended regular cardiovascular exercise 3-6 times per week for 30-60 minutes daily. Current Outpatient Medications   Medication Sig Dispense Refill    fluoride, sodium, 1.1 % pste BRUSH WITH A PEA SIZED AMOUNT FOR 2 MINUTES BEFORE BEDTIME. DO NOT RINSE/DRINK/EAT AFTER BRUSHING.       Blood-Glucose Meter (True Metrix Glucose Meter) misc Use daily as needed for diabetes management. Diagnosis E11.9 1 Each 0    glucose blood VI test strips (True Metrix Glucose Test Strip) strip Use 1 to 2 times daily as needed for Diabetes management. Diagnosis is 411.9. 50 Strip 5    ramipriL (ALTACE) 10 mg capsule TAKE 2 TABLETS BY MOUTH EVERY  Cap 3    metoprolol succinate (TOPROL-XL) 25 mg XL tablet TAKE 1 TABLET BY MOUTH EVERY DAY 90 Tab 3    metFORMIN (GLUCOPHAGE) 500 mg tablet TAKE 1 TABLET BY MOUTH TWICE A DAY WITH MEALS 180 Tab 3    PURELAX 17 gram/dose powder MIX 17G IN WATER AND DRINK DAILY 850 g 5    omeprazole (PRILOSEC) 20 mg capsule TAKE ONE CAPSULE BY MOUTH ONCE DAILY (Patient taking differently: as needed. TAKE ONE CAPSULE BY MOUTH ONCE DAILY) 90 Cap 3    OTHER       glucose blood VI test strips (TRUETEST TEST STRIPS) strip by Does Not Apply route See Admin Instructions. 100 Strip prn    UBIDECARENONE (CO Q-10 PO) Take  by mouth.  OTHER Take 7 Tabs by mouth daily. [facility]      aspirin 81 mg tablet Take 81 mg by mouth daily. Indications: taking one a day         No results found for any visits on 09/15/21. Verbal and written instructions (see AVS) provided. Patient expresses understanding of diagnosis and treatment plan.     Sadia Newman MD

## 2021-09-15 ENCOUNTER — OFFICE VISIT (OUTPATIENT)
Dept: INTERNAL MEDICINE CLINIC | Age: 81
End: 2021-09-15
Payer: MEDICARE

## 2021-09-15 VITALS
HEART RATE: 60 BPM | BODY MASS INDEX: 22.87 KG/M2 | SYSTOLIC BLOOD PRESSURE: 118 MMHG | RESPIRATION RATE: 17 BRPM | OXYGEN SATURATION: 99 % | WEIGHT: 116.5 LBS | DIASTOLIC BLOOD PRESSURE: 76 MMHG | HEIGHT: 60 IN | TEMPERATURE: 98.5 F

## 2021-09-15 DIAGNOSIS — M15.9 PRIMARY OSTEOARTHRITIS INVOLVING MULTIPLE JOINTS: ICD-10-CM

## 2021-09-15 DIAGNOSIS — I10 ESSENTIAL HYPERTENSION: Primary | ICD-10-CM

## 2021-09-15 DIAGNOSIS — I42.9 CARDIOMYOPATHY, UNSPECIFIED TYPE (HCC): ICD-10-CM

## 2021-09-15 DIAGNOSIS — E11.42 CONTROLLED TYPE 2 DIABETES MELLITUS WITH DIABETIC POLYNEUROPATHY, WITHOUT LONG-TERM CURRENT USE OF INSULIN (HCC): ICD-10-CM

## 2021-09-15 DIAGNOSIS — K57.90 DIVERTICULOSIS: ICD-10-CM

## 2021-09-15 DIAGNOSIS — Z12.5 PROSTATE CANCER SCREENING: ICD-10-CM

## 2021-09-15 DIAGNOSIS — E78.2 MIXED HYPERLIPIDEMIA: ICD-10-CM

## 2021-09-15 DIAGNOSIS — F41.9 ANXIETY: ICD-10-CM

## 2021-09-15 DIAGNOSIS — I25.10 ASCVD (ARTERIOSCLEROTIC CARDIOVASCULAR DISEASE): ICD-10-CM

## 2021-09-15 DIAGNOSIS — Z13.39 ALCOHOL SCREENING: ICD-10-CM

## 2021-09-15 DIAGNOSIS — Z00.00 MEDICARE ANNUAL WELLNESS VISIT, SUBSEQUENT: ICD-10-CM

## 2021-09-15 LAB — MICROALBUMIN UR TEST STR-MCNC: 10 MG/L (ref 0–20)

## 2021-09-15 PROCEDURE — G0439 PPPS, SUBSEQ VISIT: HCPCS | Performed by: INTERNAL MEDICINE

## 2021-09-15 PROCEDURE — G8427 DOCREV CUR MEDS BY ELIG CLIN: HCPCS | Performed by: INTERNAL MEDICINE

## 2021-09-15 PROCEDURE — 1101F PT FALLS ASSESS-DOCD LE1/YR: CPT | Performed by: INTERNAL MEDICINE

## 2021-09-15 PROCEDURE — G8510 SCR DEP NEG, NO PLAN REQD: HCPCS | Performed by: INTERNAL MEDICINE

## 2021-09-15 PROCEDURE — G8752 SYS BP LESS 140: HCPCS | Performed by: INTERNAL MEDICINE

## 2021-09-15 PROCEDURE — G8754 DIAS BP LESS 90: HCPCS | Performed by: INTERNAL MEDICINE

## 2021-09-15 PROCEDURE — 93000 ELECTROCARDIOGRAM COMPLETE: CPT | Performed by: INTERNAL MEDICINE

## 2021-09-15 PROCEDURE — G8536 NO DOC ELDER MAL SCRN: HCPCS | Performed by: INTERNAL MEDICINE

## 2021-09-15 PROCEDURE — 82044 UR ALBUMIN SEMIQUANTITATIVE: CPT | Performed by: INTERNAL MEDICINE

## 2021-09-15 PROCEDURE — G8420 CALC BMI NORM PARAMETERS: HCPCS | Performed by: INTERNAL MEDICINE

## 2021-09-15 PROCEDURE — 99214 OFFICE O/P EST MOD 30 MIN: CPT | Performed by: INTERNAL MEDICINE

## 2021-09-15 NOTE — PATIENT INSTRUCTIONS

## 2021-09-15 NOTE — PROGRESS NOTES
Chief Complaint   Patient presents with   [facility] Annual Wellness Visit     Visit Vitals  /76 (BP 1 Location: Left upper arm, BP Patient Position: Sitting, BP Cuff Size: Adult)   Pulse 60   Temp 98.5 °F (36.9 °C) (Oral)   Resp 17   Ht 4' 11\" (1.499 m)   Wt 116 lb 8 oz (52.8 kg)   SpO2 99%   BMI 23.53 kg/m²     1. Have you been to the ER, urgent care clinic since your last visit? Hospitalized since your last visit? No    2. Have you seen or consulted any other health care providers outside of the [address] [place] [address] since your last visit? Include any pap smears or colon screening. No      Depression Risk Factor Screening:     3 most recent PHQ Screens 9/15/2021   Little interest or pleasure in doing things Not at all   Feeling down, depressed, irritable, or hopeless Not at all   Total Score PHQ 2 0       Functional Ability and Level of Safety:     Activities of Daily Living  ADL Assessment 9/15/2021   Feeding yourself No Help Needed   Getting from bed to chair No Help Needed   Getting dressed No Help Needed   Bathing or showering No Help Needed   Walk across the room (includes cane/walker) No Help Needed   Using the telphone No Help Needed   Taking your medications No Help Needed   Preparing meals No Help Needed   Managing money (expenses/bills) No Help Needed   Moderately strenuous housework (laundry) No Help Needed   Shopping for personal items (toiletries/medicines) No Help Needed   Shopping for groceries No Help Needed   Driving No Help Needed   Climbing a flight of stairs No Help Needed   Getting to places beyond walking distances No Help Needed       Fall Risk  Fall Risk Assessment, last 12 mths 9/15/2021   Able to walk? Yes   Fall in past 12 months? 0   Do you feel unsteady? 1   Are you worried about falling 1   Is TUG test greater than 12 seconds? 0   Is the gait abnormal? 0       Abuse Screen  Abuse Screening Questionnaire 9/15/2021   Do you ever feel afraid of your partner?  N   Are you in a relationship with someone who physically or mentally threatens you? N   Is it safe for you to go home?  Y         Patient Care Team   Patient Care Team:  Khanh Blankenship MD as PCP - General (Internal Medicine)  Khanh Blankenship MD as PCP - [facility]

## 2021-09-16 LAB
ALBUMIN SERPL-MCNC: 4 G/DL (ref 3.5–5)
ALBUMIN/GLOB SERPL: 1.1 {RATIO} (ref 1.1–2.2)
ALP SERPL-CCNC: 74 U/L (ref 45–117)
ALT SERPL-CCNC: 24 U/L (ref 12–78)
ANION GAP SERPL CALC-SCNC: 6 MMOL/L (ref 5–15)
APPEARANCE UR: CLEAR
AST SERPL-CCNC: 25 U/L (ref 15–37)
BACTERIA URNS QL MICRO: NEGATIVE /HPF
BASOPHILS # BLD: 0 K/UL (ref 0–0.1)
BASOPHILS NFR BLD: 0 % (ref 0–1)
BILIRUB SERPL-MCNC: 1 MG/DL (ref 0.2–1)
BILIRUB UR QL: NEGATIVE
BUN SERPL-MCNC: 13 MG/DL (ref 6–20)
BUN/CREAT SERPL: 16 (ref 12–20)
CALCIUM SERPL-MCNC: 10 MG/DL (ref 8.5–10.1)
CHLORIDE SERPL-SCNC: 104 MMOL/L (ref 97–108)
CHOLEST SERPL-MCNC: 250 MG/DL
CK SERPL-CCNC: 114 U/L (ref 39–308)
CO2 SERPL-SCNC: 27 MMOL/L (ref 21–32)
COLOR UR: ABNORMAL
CREAT SERPL-MCNC: 0.8 MG/DL (ref 0.7–1.3)
DIFFERENTIAL METHOD BLD: ABNORMAL
EOSINOPHIL # BLD: 0.1 K/UL (ref 0–0.4)
EOSINOPHIL NFR BLD: 1 % (ref 0–7)
EPITH CASTS URNS QL MICRO: ABNORMAL /LPF
ERYTHROCYTE [DISTWIDTH] IN BLOOD BY AUTOMATED COUNT: 14.6 % (ref 11.5–14.5)
GLOBULIN SER CALC-MCNC: 3.6 G/DL (ref 2–4)
GLUCOSE SERPL-MCNC: 68 MG/DL (ref 65–100)
GLUCOSE UR STRIP.AUTO-MCNC: NEGATIVE MG/DL
HCT VFR BLD AUTO: 53 % (ref 36.6–50.3)
HDLC SERPL-MCNC: 69 MG/DL
HDLC SERPL: 3.6 {RATIO} (ref 0–5)
HGB BLD-MCNC: 17 G/DL (ref 12.1–17)
HGB UR QL STRIP: ABNORMAL
IMM GRANULOCYTES # BLD AUTO: 0 K/UL (ref 0–0.04)
IMM GRANULOCYTES NFR BLD AUTO: 0 % (ref 0–0.5)
KETONES UR QL STRIP.AUTO: NEGATIVE MG/DL
LDLC SERPL CALC-MCNC: 166.6 MG/DL (ref 0–100)
LEUKOCYTE ESTERASE UR QL STRIP.AUTO: NEGATIVE
LYMPHOCYTES # BLD: 2.3 K/UL (ref 0.8–3.5)
LYMPHOCYTES NFR BLD: 31 % (ref 12–49)
MCH RBC QN AUTO: 33 PG (ref 26–34)
MCHC RBC AUTO-ENTMCNC: 32.1 G/DL (ref 30–36.5)
MCV RBC AUTO: 102.9 FL (ref 80–99)
MONOCYTES # BLD: 0.7 K/UL (ref 0–1)
MONOCYTES NFR BLD: 9 % (ref 5–13)
NEUTS SEG # BLD: 4.5 K/UL (ref 1.8–8)
NEUTS SEG NFR BLD: 59 % (ref 32–75)
NITRITE UR QL STRIP.AUTO: NEGATIVE
NRBC # BLD: 0 K/UL (ref 0–0.01)
NRBC BLD-RTO: 0 PER 100 WBC
PH UR STRIP: 7 [PH] (ref 5–8)
PLATELET # BLD AUTO: 181 K/UL (ref 150–400)
PMV BLD AUTO: 10.5 FL (ref 8.9–12.9)
POTASSIUM SERPL-SCNC: 4.7 MMOL/L (ref 3.5–5.1)
PROT SERPL-MCNC: 7.6 G/DL (ref 6.4–8.2)
PROT UR STRIP-MCNC: NEGATIVE MG/DL
PSA SERPL-MCNC: 1.3 NG/ML (ref 0.01–4)
RBC # BLD AUTO: 5.15 M/UL (ref 4.1–5.7)
RBC #/AREA URNS HPF: ABNORMAL /HPF (ref 0–5)
SODIUM SERPL-SCNC: 137 MMOL/L (ref 136–145)
SP GR UR REFRACTOMETRY: <1.005 (ref 1–1.03)
T4 SERPL-MCNC: 11.7 UG/DL (ref 4.5–12.1)
TRIGL SERPL-MCNC: 72 MG/DL (ref ?–150)
TSH SERPL DL<=0.05 MIU/L-ACNC: 1.74 UIU/ML (ref 0.36–3.74)
UA: UC IF INDICATED,UAUC: ABNORMAL
UROBILINOGEN UR QL STRIP.AUTO: 0.2 EU/DL (ref 0.2–1)
VLDLC SERPL CALC-MCNC: 14.4 MG/DL
WBC # BLD AUTO: 7.6 K/UL (ref 4.1–11.1)
WBC URNS QL MICRO: ABNORMAL /HPF (ref 0–4)

## 2021-09-20 RX ORDER — ATORVASTATIN CALCIUM 20 MG/1
20 TABLET, FILM COATED ORAL DAILY
Qty: 90 TABLET | Refills: 3 | Status: SHIPPED | OUTPATIENT
Start: 2021-09-20 | End: 2022-07-28

## 2021-09-20 RX ORDER — POLYETHYLENE GLYCOL 3350 17 G/17G
POWDER, FOR SOLUTION ORAL
Qty: 850 G | Refills: 5 | Status: SHIPPED | OUTPATIENT
Start: 2021-09-20

## 2021-09-20 NOTE — PROGRESS NOTES
Cholesterol and LDL remain a little too high so add Lipitor 20 mg daily. Discussed with patient. Rx sent to patient's pharmacy. F/up as scheduled.

## 2021-09-20 NOTE — TELEPHONE ENCOUNTER
RX refill request from the patient/pharmacy. Patient last seen 09- with labs, and next appt. scheduled for 12-  Requested Prescriptions     Pending Prescriptions Disp Refills    atorvastatin (LIPITOR) 20 mg tablet 90 Tablet 3     Sig: Take 1 Tablet by mouth daily. Carlyle Arriaga

## 2021-10-14 PROBLEM — Z12.5 PROSTATE CANCER SCREENING: Status: RESOLVED | Noted: 2021-09-14 | Resolved: 2021-10-14

## 2021-10-14 PROBLEM — Z00.00 MEDICARE ANNUAL WELLNESS VISIT, SUBSEQUENT: Status: RESOLVED | Noted: 2017-08-07 | Resolved: 2021-10-14

## 2021-10-26 ENCOUNTER — CLINICAL SUPPORT (OUTPATIENT)
Dept: INTERNAL MEDICINE CLINIC | Age: 81
End: 2021-10-26
Payer: MEDICARE

## 2021-10-26 VITALS
HEART RATE: 62 BPM | HEIGHT: 60 IN | RESPIRATION RATE: 16 BRPM | SYSTOLIC BLOOD PRESSURE: 146 MMHG | WEIGHT: 116 LBS | DIASTOLIC BLOOD PRESSURE: 96 MMHG | TEMPERATURE: 97.9 F | BODY MASS INDEX: 22.78 KG/M2 | OXYGEN SATURATION: 98 %

## 2021-10-26 DIAGNOSIS — Z23 NEEDS FLU SHOT: Primary | ICD-10-CM

## 2021-10-26 PROCEDURE — G0008 ADMIN INFLUENZA VIRUS VAC: HCPCS | Performed by: INTERNAL MEDICINE

## 2021-10-26 PROCEDURE — 90694 VACC AIIV4 NO PRSRV 0.5ML IM: CPT | Performed by: INTERNAL MEDICINE

## 2021-10-26 NOTE — PROGRESS NOTES
After obtaining consent, and per orders of Dr. Claudene Providence, injection of flu shot given by Yobany Diego LPN. Patient instructed to remain in clinic for 20 minutes afterwards, and to report any adverse reaction to me immediately.

## 2021-10-26 NOTE — PATIENT INSTRUCTIONS
Vaccine Information Statement    Influenza (Flu) Vaccine (Inactivated or Recombinant): What You Need to Know    Many vaccine information statements are available in [place] and other languages. See www.immunize.org/vis. Hojas de información sobre vacunas están disponibles en español y en muchos otros idiomas. Visite www.immunize.org/vis. 1. Why get vaccinated? Influenza vaccine can prevent influenza (flu). Flu is a contagious disease that spreads around the [facility] every year, usually between October and May. Anyone can get the flu, but it is more dangerous for some people. Infants and young children, people 72 years and older, pregnant people, and people with certain health conditions or a weakened immune system are at greatest risk of flu complications. Pneumonia, bronchitis, sinus infections, and ear infections are examples of flu-related complications. If you have a medical condition, such as heart disease, cancer, or diabetes, flu can make it worse. Flu can cause fever and chills, sore throat, muscle aches, fatigue, cough, headache, and runny or stuffy nose. Some people may have vomiting and diarrhea, though this is more common in children than adults. In an average year, thousands of people in the [facility] die from flu, and many more are hospitalized. Flu vaccine prevents millions of illnesses and flu-related visits to the doctor each year. 2. Influenza vaccines     CDC recommends everyone 6 months and older get vaccinated every flu season. Children 6 months through 6years of age may need 2 doses during a single flu season. Everyone else needs only 1 dose each flu season. It takes about 2 weeks for protection to develop after vaccination. There are many flu viruses, and they are always changing. Each year a new flu vaccine is made to protect against the influenza viruses believed to be likely to cause disease in the upcoming flu season.  Even when the vaccine doesnt exactly match these viruses, it may still provide some protection. Influenza vaccine does not cause flu. Influenza vaccine may be given at the same time as other vaccines. 3. Talk with your health care provider    Tell your vaccination provider if the person getting the vaccine:   Has had an allergic reaction after a previous dose of influenza vaccine, or has any severe, life-threatening allergies    Has ever had Guillain-Barré Syndrome (also called GBS)    In some cases, your health care provider may decide to postpone influenza vaccination until a future visit. Influenza vaccine can be administered at any time during pregnancy. People who are or will be pregnant during influenza season should receive inactivated influenza vaccine. People with minor illnesses, such as a cold, may be vaccinated. People who are moderately or severely ill should usually wait until they recover before getting influenza vaccine. Your health care provider can give you more information. 4. Risks of a vaccine reaction     Soreness, redness, and swelling where the shot is given, fever, muscle aches, and headache can happen after influenza vaccination.  There may be a very small increased risk of Guillain-Barré Syndrome (GBS) after inactivated influenza vaccine (the flu shot). [facility] children who get the flu shot along with pneumococcal vaccine (PCV13) and/or DTaP vaccine at the same time might be slightly more likely to have a seizure caused by fever. Tell your health care provider if a child who is getting flu vaccine has ever had a seizure. People sometimes faint after medical procedures, including vaccination. Tell your provider if you feel dizzy or have vision changes or ringing in the ears. As with any medicine, there is a very remote chance of a vaccine causing a severe allergic reaction, other serious injury, or death. 5. What if there is a serious problem?     An allergic reaction could occur after the vaccinated person leaves the clinic. If you see signs of a severe allergic reaction (hives, swelling of the face and throat, difficulty breathing, a fast heartbeat, dizziness, or weakness), call 9-1-1 and get the person to the nearest hospital.    For other signs that concern you, call your health care provider. Adverse reactions should be reported to the Vaccine Adverse Event Reporting System (VAERS). Your health care provider will usually file this report, or you can do it yourself. Visit the VAERS website at www.vaers. [facility].gov or call 6-390.850.3503. VAERS is only for reporting reactions, and VAERS staff members do not give medical advice. 6. The National Vaccine Injury Compensation Program    The [facility] Vaccine Injury Compensation Program (VICP) is a federal program that was created to compensate people who may have been injured by certain vaccines. Claims regarding alleged injury or death due to vaccination have a time limit for filing, which may be as short as two years. Visit the VICP website at www.[facility].gov/vaccinecompensation or call 3-806.943.6300 to learn about the program and about filing a claim. 7. How can I learn more?  Ask your health care provider.  Call your local or state health department.  Visit the website of the Food and Drug Administration (FDA) for vaccine package inserts and additional information at www.fda.gov/vaccines-blood-biologics/vaccines.  Contact the Centers for Disease Control and Prevention (CDC):  - Call 5-807.212.2180 (1-800-CDC-INFO) or  - Visit CDCs influenza website at www.cdc.gov/flu. Vaccine Information Statement   Inactivated Influenza Vaccine   8/6/2021  41 HEATHER Yoder [id number]   Department of Health and Human Services  Centers for Disease Control and Prevention    Office Use Only

## 2021-11-01 RX ORDER — METFORMIN HYDROCHLORIDE 500 MG/1
TABLET ORAL
Qty: 180 TABLET | Refills: 3 | Status: SHIPPED | OUTPATIENT
Start: 2021-11-01 | End: 2022-11-02

## 2021-11-01 NOTE — TELEPHONE ENCOUNTER
RX refill request from the patient/pharmacy. Patient last seen 09- with labs, and next appt. scheduled for 12-  Requested Prescriptions     Pending Prescriptions Disp Refills    metFORMIN (GLUCOPHAGE) 500 mg tablet [Pharmacy Med Name: METFORMIN  MG TABLET] 180 Tablet 3     Sig: TAKE 1 TABLET BY MOUTH TWICE A DAY WITH MEALS   .

## 2021-12-16 ENCOUNTER — OFFICE VISIT (OUTPATIENT)
Dept: INTERNAL MEDICINE CLINIC | Age: 81
End: 2021-12-16
Payer: MEDICARE

## 2021-12-16 VITALS
WEIGHT: 118 LBS | RESPIRATION RATE: 16 BRPM | DIASTOLIC BLOOD PRESSURE: 88 MMHG | HEART RATE: 60 BPM | BODY MASS INDEX: 23.16 KG/M2 | OXYGEN SATURATION: 97 % | TEMPERATURE: 97.7 F | SYSTOLIC BLOOD PRESSURE: 134 MMHG | HEIGHT: 60 IN

## 2021-12-16 DIAGNOSIS — I10 ESSENTIAL HYPERTENSION: Primary | ICD-10-CM

## 2021-12-16 DIAGNOSIS — I42.9 CARDIOMYOPATHY, UNSPECIFIED TYPE (HCC): ICD-10-CM

## 2021-12-16 DIAGNOSIS — F41.9 ANXIETY: ICD-10-CM

## 2021-12-16 DIAGNOSIS — E78.2 MIXED HYPERLIPIDEMIA: ICD-10-CM

## 2021-12-16 DIAGNOSIS — E11.42 CONTROLLED TYPE 2 DIABETES MELLITUS WITH DIABETIC POLYNEUROPATHY, WITHOUT LONG-TERM CURRENT USE OF INSULIN (HCC): ICD-10-CM

## 2021-12-16 DIAGNOSIS — M15.9 PRIMARY OSTEOARTHRITIS INVOLVING MULTIPLE JOINTS: ICD-10-CM

## 2021-12-16 LAB
ALBUMIN SERPL-MCNC: 3.8 G/DL (ref 3.5–5)
ALBUMIN/GLOB SERPL: 1.1 {RATIO} (ref 1.1–2.2)
ALP SERPL-CCNC: 96 U/L (ref 45–117)
ALT SERPL-CCNC: 30 U/L (ref 12–78)
ANION GAP SERPL CALC-SCNC: 2 MMOL/L (ref 5–15)
AST SERPL-CCNC: 26 U/L (ref 15–37)
BILIRUB SERPL-MCNC: 0.9 MG/DL (ref 0.2–1)
BUN SERPL-MCNC: 14 MG/DL (ref 6–20)
BUN/CREAT SERPL: 18 (ref 12–20)
CALCIUM SERPL-MCNC: 10 MG/DL (ref 8.5–10.1)
CHLORIDE SERPL-SCNC: 106 MMOL/L (ref 97–108)
CHOLEST SERPL-MCNC: 135 MG/DL
CK SERPL-CCNC: 114 U/L (ref 39–308)
CO2 SERPL-SCNC: 31 MMOL/L (ref 21–32)
CREAT SERPL-MCNC: 0.8 MG/DL (ref 0.7–1.3)
EST. AVERAGE GLUCOSE BLD GHB EST-MCNC: 117 MG/DL
GLOBULIN SER CALC-MCNC: 3.4 G/DL (ref 2–4)
GLUCOSE SERPL-MCNC: 87 MG/DL (ref 65–100)
HBA1C MFR BLD: 5.7 % (ref 4–5.6)
HDLC SERPL-MCNC: 61 MG/DL
HDLC SERPL: 2.2 {RATIO} (ref 0–5)
LDLC SERPL CALC-MCNC: 64.8 MG/DL (ref 0–100)
POTASSIUM SERPL-SCNC: 4.6 MMOL/L (ref 3.5–5.1)
PROT SERPL-MCNC: 7.2 G/DL (ref 6.4–8.2)
SODIUM SERPL-SCNC: 139 MMOL/L (ref 136–145)
TRIGL SERPL-MCNC: 46 MG/DL (ref ?–150)
VLDLC SERPL CALC-MCNC: 9.2 MG/DL

## 2021-12-16 PROCEDURE — G8510 SCR DEP NEG, NO PLAN REQD: HCPCS | Performed by: INTERNAL MEDICINE

## 2021-12-16 PROCEDURE — 1101F PT FALLS ASSESS-DOCD LE1/YR: CPT | Performed by: INTERNAL MEDICINE

## 2021-12-16 PROCEDURE — G8754 DIAS BP LESS 90: HCPCS | Performed by: INTERNAL MEDICINE

## 2021-12-16 PROCEDURE — G8752 SYS BP LESS 140: HCPCS | Performed by: INTERNAL MEDICINE

## 2021-12-16 PROCEDURE — G8427 DOCREV CUR MEDS BY ELIG CLIN: HCPCS | Performed by: INTERNAL MEDICINE

## 2021-12-16 PROCEDURE — G8536 NO DOC ELDER MAL SCRN: HCPCS | Performed by: INTERNAL MEDICINE

## 2021-12-16 PROCEDURE — 99214 OFFICE O/P EST MOD 30 MIN: CPT | Performed by: INTERNAL MEDICINE

## 2021-12-16 PROCEDURE — G8420 CALC BMI NORM PARAMETERS: HCPCS | Performed by: INTERNAL MEDICINE

## 2021-12-16 NOTE — PATIENT INSTRUCTIONS

## 2021-12-16 NOTE — PROGRESS NOTES
HIPAA verified by two patient identifiers. Stacey Aguilar is a 79 y.o. male    Chief Complaint   Patient presents with    Hypertension     3 month    Diabetes    Cholesterol Problem    Anxiety       Visit Vitals  BP (!) 180/83 (BP 1 Location: Left upper arm, BP Patient Position: Sitting, BP Cuff Size: Adult)   Pulse 60   Temp 97.7 °F (36.5 °C) (Oral)   Resp 16   Ht 4' 11\" (1.499 m)   Wt 118 lb (53.5 kg)   SpO2 97%   BMI 23.83 kg/m²       Pain Scale: 0 - No pain/10  Pain Location:       Health Maintenance Due   Topic Date Due    DTaP/Tdap/Td series (1 - Tdap) Never done    Shingrix Vaccine Age 50> (1 of 2) Never done         Coordination of Care Questionnaire:  :   1) Have you been to an emergency room, urgent care, or hospitalized since your last visit? If yes, where when, and reason for visit? no       2. Have seen or consulted any other health care provider since your last visit? If yes, where when, and reason for visit? NO      Patient is accompanied by self I have received verbal consent from Stacey Aguilar to discuss any/all medical information while they are present in the room.

## 2021-12-16 NOTE — PROGRESS NOTES
Chief Complaint   Patient presents with    Hypertension     3 month    Diabetes    Cholesterol Problem    Anxiety       SUBJECTIVE:    Dorothy Bellamy is a 79 y.o. male who returns in follow-up of his medical problems include hypertension, diabetes, ASCVD, hyperlipidemia, cardiomyopathy, DJD and other medical problems. He is taking his medications and trying to follow his diet and try and remain physically active. He currently denies any chest pain, shortness of breath, palpitations, PND, orthopnea or other cardiac or respiratory complaints. He notes no current GI or  complaints. He notes no headaches, dizziness or neurologic complaints. No current active arthritic complaints and he has no other complaints on complete review of systems. Current Outpatient Medications   Medication Sig Dispense Refill    metFORMIN (GLUCOPHAGE) 500 mg tablet TAKE 1 TABLET BY MOUTH TWICE A DAY WITH MEALS 180 Tablet 3    atorvastatin (LIPITOR) 20 mg tablet Take 1 Tablet by mouth daily. 90 Tablet 3    polyethylene glycol (Purelax) 17 gram/dose powder MIX 17G IN WATER AND DRINK DAILY 850 g 5    fluoride, sodium, 1.1 % pste BRUSH WITH A PEA SIZED AMOUNT FOR 2 MINUTES BEFORE BEDTIME. DO NOT RINSE/DRINK/EAT AFTER BRUSHING.  Blood-Glucose Meter (True Metrix Glucose Meter) misc Use daily as needed for diabetes management. Diagnosis E11.9 1 Each 0    glucose blood VI test strips (True Metrix Glucose Test Strip) strip Use 1 to 2 times daily as needed for Diabetes management. Diagnosis is 411.9. 50 Strip 5    ramipriL (ALTACE) 10 mg capsule TAKE 2 TABLETS BY MOUTH EVERY  Cap 3    metoprolol succinate (TOPROL-XL) 25 mg XL tablet TAKE 1 TABLET BY MOUTH EVERY DAY 90 Tab 3    omeprazole (PRILOSEC) 20 mg capsule TAKE ONE CAPSULE BY MOUTH ONCE DAILY (Patient taking differently: as needed.  TAKE ONE CAPSULE BY MOUTH ONCE DAILY) 90 Cap 3    OTHER       glucose blood VI test strips (TRUETEST TEST STRIPS) strip by Does Not Apply route See Admin Instructions. 100 Strip prn    UBIDECARENONE (CO Q-10 PO) Take  by mouth.  OTHER Take 7 Tabs by mouth daily. HEART HEALTH FROM [facility]      aspirin 81 mg tablet Take 81 mg by mouth daily.  Indications: taking one a day       Past Medical History:   Diagnosis Date    Anxiety 8/5/2017    Arrhythmia     Arthritis     Atherosclerotic heart disease of native coronary artery without angina pectoris 8/5/2017    Bradycardia 8/5/2017    CAD (coronary artery disease)     Constipation, chronic 8/5/2017    Diabetes ([address]     diet controlled    Diabetes mellitus ([address] 8/5/2017    Diverticulosis 8/5/2017    ED (erectile dysfunction) 8/5/2017    GERD (gastroesophageal reflux disease)     Hypertension     Hypogonadism male 8/5/2017    Kidney stone     Neuropathy 8/5/2017    On statin therapy 8/5/2017    Right foot pain 8/5/2017     Past Surgical History:   Procedure Laterality Date    COLONOSCOPY N/A 6/21/2016    COLONOSCOPY performed by Frederic Valentin MD at [facility] [employer]; HI RISK IND  6/21/2016         HX GI      COLONOSCOPY    HX HEART CATHETERIZATION      HX OTHER SURGICAL      artificial testicle    HX PACEMAKER  10/6/15    [place] CARDIAC SURG PROCEDURE UNLIST      cabg x4 vessels 1999    [place] COLONOSCOPY FLX DX W/COLLJ SPEC WHEN PFRMD  4/8/2011         UPPER GI ENDOSCOPY,BIOPSY  12/14/2016          Allergies   Allergen Reactions    Caffeine Unknown (comments)     Sweating AND WOOZY AND CAN'T SLEEP    Codeine Other (comments)     Lightheaded, WOOZY AND CAN'T SLEEP    Percocet [Oxycodone-Acetaminophen] Nausea and Vomiting       REVIEW OF SYSTEMS:  General: negative for - chills or fever, or weight loss or gain  ENT: negative for - headaches, nasal congestion or tinnitus  Eyes: no blurred or visual changes  Neck: No stiffness or swollen nodes  Respiratory: negative for - cough, hemoptysis, shortness of breath or wheezing  Cardiovascular : negative for - chest pain, edema, palpitations or shortness of breath  Gastrointestinal: negative for - abdominal pain, blood in stools, heartburn or nausea/vomiting  Genito-Urinary: no dysuria, trouble voiding, or hematuria  Musculoskeletal: negative for - gait disturbance, joint pain, joint stiffness or joint swelling  Neurological: no TIA or stroke symptoms  Hematologic: no bruises, no bleeding  Lymphatic: no swollen glands  Integument: no lumps, mole changes, nail changes or rash  Endocrine:no malaise/lethargy poly uria or polydipsia or unexpected weight changes        Social History     Socioeconomic History    Marital status:    Tobacco Use    Smoking status: Never Smoker    Smokeless tobacco: Never Used   Vaping Use    Vaping Use: Never used   Substance and Sexual Activity    Alcohol use: No    Drug use: No     Family History   Problem Relation Age of Onset    Heart Disease Mother     No Known Problems Father     Cancer Sister     Diabetes Brother     Anesth Problems Neg Hx        OBJECTIVE:     Visit Vitals  /88   Pulse 60   Temp 97.7 °F (36.5 °C) (Oral)   Resp 16   Ht 4' 11\" (1.499 m)   Wt 118 lb (53.5 kg)   SpO2 97%   BMI 23.83 kg/m²     CONSTITUTIONAL:   well nourished, appears age appropriate  EYES: sclera anicteric, PERRL, EOMI  ENMT:nares clear, moist mucous membranes, pharynx clear  NECK: supple. Thyroid normal, No JVD or bruits  RESPIRATORY: Chest: clear to ascultation and percussion, normal inspiratory effort  CARDIOVASCULAR: Heart: regular rate and rhythm no murmurs, rubs or gallops, PMI not displaced, No thrills, no peripheral edema  GASTROINTESTINAL: Abdomen: non distended, soft, non tender, bowel sounds normal  HEMATOLOGIC: no purpura, petechiae or bruising  LYMPHATIC: No lymph node enlargemant  MUSCULOSKELETAL: Extremities: no active synovitis, pulse 1+   INTEGUMENT: No unusual rashes or suspicious skin lesions noted.  Nails appear normal.  PERIPHERAL VASCULAR: normal pulses femoral, PT and DP  NEUROLOGIC: non-focal exam, A & O X 3  PSYCHIATRIC:, appropriate affect     ASSESSMENT:   1. Essential hypertension    2. Controlled type 2 diabetes mellitus with diabetic polyneuropathy, without long-term current use of insulin ([facility]    3. Mixed hyperlipidemia    4. Cardiomyopathy, unspecified type ([facility]    5. Primary osteoarthritis involving multiple joints    6. Anxiety      Impression  1. Hypertension that is controlled so continue current therapy reviewed with him. 2.  Diabetes repeat status pending prior lab reviewed number adjustments if necessary. 3.  Hyperlipidemia prior lab reviewed repeat status pending I will adjust if needed. 4.  Cardiomyopathy stable  5. DJD stable  6 anxiety chronic and unchanged  I will call the lab and make further recommendations or adjustments if necessary. Follow-up in 3 months or sooner if there is a problem. PLAN:  .  Orders Placed This Encounter    METABOLIC PANEL, COMPREHENSIVE    LIPID PANEL    CK    HEMOGLOBIN A1C WITH EAG         ATTENTION:   This medical record was transcribed using an electronic medical records system. Although proofread, it may and can contain electronic and spelling errors. Other human spelling and other errors may be present. Corrections may be executed at a later time. Please feel free to contact us for any clarifications as needed. Follow-up and Dispositions    · Return in about 3 months (around 3/16/2022). No results found for any visits on 12/16/21. Simon Ortega MD    The patient verbalized understanding of the problems and plans as explained.

## 2022-03-14 NOTE — PROGRESS NOTES
Chief Complaint   Patient presents with    Hypertension     3 month follow up    Diabetes    Cholesterol Problem       SUBJECTIVE:    Nicole Almeida is a 79 y.o. male follow-up of his medical problems include hypertension, diabetes, hyperlipidemia, cardiomyopathy, ASCVD, anxiety and other medical problems. He is taking his medications and trying to follow his diet and remains physically active. He currently denies any chest pain, shortness of breath, palpitations, PND, orthopnea or other cardiac or respiratory complaints. He has no current GI or  complaints. He has no headaches, dizziness or neurologic complaints. There are no current active arthritic complaints although he does have some chronic joint aches and pains have not changed. He has no other complaints on complete review of systems. Current Outpatient Medications   Medication Sig Dispense Refill    metFORMIN (GLUCOPHAGE) 500 mg tablet TAKE 1 TABLET BY MOUTH TWICE A DAY WITH MEALS 180 Tablet 3    atorvastatin (LIPITOR) 20 mg tablet Take 1 Tablet by mouth daily. 90 Tablet 3    polyethylene glycol (Purelax) 17 gram/dose powder MIX 17G IN WATER AND DRINK DAILY 850 g 5    fluoride, sodium, 1.1 % pste BRUSH WITH A PEA SIZED AMOUNT FOR 2 MINUTES BEFORE BEDTIME. DO NOT RINSE/DRINK/EAT AFTER BRUSHING.  Blood-Glucose Meter (True Metrix Glucose Meter) misc Use daily as needed for diabetes management. Diagnosis E11.9 1 Each 0    glucose blood VI test strips (True Metrix Glucose Test Strip) strip Use 1 to 2 times daily as needed for Diabetes management. Diagnosis is 411.9. 50 Strip 5    ramipriL (ALTACE) 10 mg capsule TAKE 2 TABLETS BY MOUTH EVERY  Cap 3    metoprolol succinate (TOPROL-XL) 25 mg XL tablet TAKE 1 TABLET BY MOUTH EVERY DAY 90 Tab 3    omeprazole (PRILOSEC) 20 mg capsule TAKE ONE CAPSULE BY MOUTH ONCE DAILY (Patient taking differently: as needed.  TAKE ONE CAPSULE BY MOUTH ONCE DAILY) 90 Cap 3    OTHER       glucose blood VI test strips (TRUETEST TEST STRIPS) strip by Does Not Apply route See Admin Instructions. 100 Strip prn    UBIDECARENONE (CO Q-10 PO) Take  by mouth.  OTHER Take 7 Tabs by mouth daily. [facility] HE[facility] FROM [facility]      aspirin 81 mg tablet Take 81 mg by mouth daily.  Indications: taking one a day       Past Medical History:   Diagnosis Date    Anxiety 8/5/2017    Arrhythmia     Arthritis     Atherosclerotic heart disease of native coronary artery without angina pectoris 8/5/2017    Bradycardia 8/5/2017    CAD (coronary artery disease)     Constipation, chronic 8/5/2017    Diabetes ([address]     diet controlled    Diabetes mellitus ([facility] [address] 8/5/2017    Diverticulosis 8/5/2017    ED (erectile dysfunction) 8/5/2017    GERD (gastroesophageal reflux disease)     Hypertension     Hypogonadism male 8/5/2017    Kidney stone     Neuropathy 8/5/2017    On statin therapy 8/5/2017    Right foot pain 8/5/2017     Past Surgical History:   Procedure Laterality Date    COLONOSCOPY N/A 6/21/2016    COLONOSCOPY performed by Beverly Quiros MD at [facility] [employer]; HI RISK IND  6/21/2016         HX GI      COLONOSCOPY    HX HEART CATHETERIZATION      HX OTHER SURGICAL      artificial testicle    HX PACEMAKER  10/6/15    [place] CARDIAC SURG PROCEDURE UNLIST      cabg x4 vessels 1999    [place] COLONOSCOPY FLX DX W/COLLJ SPEC WHEN PFRMD  4/8/2011         UPPER GI ENDOSCOPY,BIOPSY  12/14/2016          Allergies   Allergen Reactions    Caffeine Unknown (comments)     Sweating AND WOOZY AND CAN'T SLEEP    Codeine Other (comments)     Lightheaded, WOOZY AND CAN'T SLEEP    Percocet [Oxycodone-Acetaminophen] Nausea and Vomiting       REVIEW OF SYSTEMS:  General: negative for - chills or fever, or weight loss or gain  ENT: negative for - headaches, nasal congestion or tinnitus  Eyes: no blurred or visual changes  Neck: No stiffness or swollen nodes  Respiratory: negative for - cough, hemoptysis, shortness of breath or wheezing  Cardiovascular : negative for - chest pain, edema, palpitations or shortness of breath  Gastrointestinal: negative for - abdominal pain, blood in stools, heartburn or nausea/vomiting  Genito-Urinary: no dysuria, trouble voiding, or hematuria  Musculoskeletal: negative for - gait disturbance, joint pain, joint stiffness or joint swelling  Neurological: no TIA or stroke symptoms  Hematologic: no bruises, no bleeding  Lymphatic: no swollen glands  Integument: no lumps, mole changes, nail changes or rash  Endocrine:no malaise/lethargy poly uria or polydipsia or unexpected weight changes        Social History     Socioeconomic History    Marital status:    Tobacco Use    Smoking status: Never Smoker    Smokeless tobacco: Never Used   Vaping Use    Vaping Use: Never used   Substance and Sexual Activity    Alcohol use: No    Drug use: No     Family History   Problem Relation Age of Onset    Heart Disease Mother     No Known Problems Father     Cancer Sister     Diabetes Brother     Anesth Problems Neg Hx        OBJECTIVE:     Visit Vitals  /78   Pulse 76   Temp 97.5 °F (36.4 °C) (Oral)   Resp 17   Ht 4' 11\" (1.499 m)   Wt 113 lb 12.8 oz (51.6 kg)   SpO2 97%   BMI 22.98 kg/m²     CONSTITUTIONAL:   well nourished, appears age appropriate  EYES: sclera anicteric, PERRL, EOMI  ENMT:nares clear, moist mucous membranes, pharynx clear  NECK: supple. Thyroid normal, No JVD or bruits  RESPIRATORY: Chest: clear to ascultation and percussion, normal inspiratory effort  CARDIOVASCULAR: Heart: regular rate and rhythm no murmurs, rubs or gallops, PMI not displaced, No thrills, no peripheral edema  GASTROINTESTINAL: Abdomen: non distended, soft, non tender, bowel sounds normal  HEMATOLOGIC: no purpura, petechiae or bruising  LYMPHATIC: No lymph node enlargemant  MUSCULOSKELETAL: Extremities: no active synovitis, pulse 1+.   No diabetic foot changes noted.  INTEGUMENT: No unusual rashes or suspicious skin lesions noted. Nails appear normal.  PERIPHERAL VASCULAR: normal pulses femoral, PT and DP  NEUROLOGIC: non-focal exam, A & O X 3. Normal distal sensation and proprioception all toes both feet. PSYCHIATRIC:, appropriate affect     ASSESSMENT:   1. Essential hypertension    2. Controlled type 2 diabetes mellitus with diabetic polyneuropathy, without long-term current use of insulin ([address]    3. Mixed hyperlipidemia    4. Primary osteoarthritis involving multiple joints    5. ASCVD (arteriosclerotic cardiovascular disease)    6. Cardiomyopathy, unspecified type ([address]      Impression  1. Hypertension that is controlled so continue current therapy reviewed with him. 2.  Diabetes repeat status pending a prior lab reviewed and I will adjust if needed. 3.  Hyperlipidemia prior lab reviewed repeat status pending I will adjust if needed. 4. DJD that is stable  5. ASCVD clinically stable  6. Cardiomyopathy stable  I will call the lab and make further recommendations or adjustments if necessary. Follow-up in 3 months or sooner if there is a problem. PLAN:  .  Orders Placed This Encounter    METABOLIC PANEL, COMPREHENSIVE    LIPID PANEL    CK    HEMOGLOBIN A1C WITH EAG         ATTENTION:   This medical record was transcribed using an electronic medical records system. Although proofread, it may and can contain electronic and spelling errors. Other human spelling and other errors may be present. Corrections may be executed at a later time. Please feel free to contact us for any clarifications as needed. Follow-up and Dispositions    · Return in about 3 months (around 6/15/2022). No results found for any visits on 03/15/22. Malathi Restrepo MD    The patient verbalized understanding of the problems and plans as explained.

## 2022-03-15 ENCOUNTER — OFFICE VISIT (OUTPATIENT)
Dept: INTERNAL MEDICINE CLINIC | Age: 82
End: 2022-03-15
Payer: MEDICARE

## 2022-03-15 VITALS
HEIGHT: 60 IN | BODY MASS INDEX: 22.34 KG/M2 | OXYGEN SATURATION: 97 % | SYSTOLIC BLOOD PRESSURE: 132 MMHG | WEIGHT: 113.8 LBS | RESPIRATION RATE: 17 BRPM | HEART RATE: 76 BPM | TEMPERATURE: 97.5 F | DIASTOLIC BLOOD PRESSURE: 78 MMHG

## 2022-03-15 DIAGNOSIS — I10 ESSENTIAL HYPERTENSION: Primary | ICD-10-CM

## 2022-03-15 DIAGNOSIS — I42.9 CARDIOMYOPATHY, UNSPECIFIED TYPE (HCC): ICD-10-CM

## 2022-03-15 DIAGNOSIS — M15.9 PRIMARY OSTEOARTHRITIS INVOLVING MULTIPLE JOINTS: ICD-10-CM

## 2022-03-15 DIAGNOSIS — I25.10 ASCVD (ARTERIOSCLEROTIC CARDIOVASCULAR DISEASE): ICD-10-CM

## 2022-03-15 DIAGNOSIS — E11.42 CONTROLLED TYPE 2 DIABETES MELLITUS WITH DIABETIC POLYNEUROPATHY, WITHOUT LONG-TERM CURRENT USE OF INSULIN (HCC): ICD-10-CM

## 2022-03-15 DIAGNOSIS — E78.2 MIXED HYPERLIPIDEMIA: ICD-10-CM

## 2022-03-15 LAB
ALBUMIN SERPL-MCNC: 3.9 G/DL (ref 3.5–5)
ALBUMIN/GLOB SERPL: 1.2 {RATIO} (ref 1.1–2.2)
ALP SERPL-CCNC: 86 U/L (ref 45–117)
ALT SERPL-CCNC: 27 U/L (ref 12–78)
ANION GAP SERPL CALC-SCNC: 8 MMOL/L (ref 5–15)
AST SERPL-CCNC: 28 U/L (ref 15–37)
BILIRUB SERPL-MCNC: 0.7 MG/DL (ref 0.2–1)
BUN SERPL-MCNC: 17 MG/DL (ref 6–20)
BUN/CREAT SERPL: 22 (ref 12–20)
CALCIUM SERPL-MCNC: 9.9 MG/DL (ref 8.5–10.1)
CHLORIDE SERPL-SCNC: 106 MMOL/L (ref 97–108)
CHOLEST SERPL-MCNC: 176 MG/DL
CK SERPL-CCNC: 159 U/L (ref 39–308)
CO2 SERPL-SCNC: 26 MMOL/L (ref 21–32)
CREAT SERPL-MCNC: 0.76 MG/DL (ref 0.7–1.3)
EST. AVERAGE GLUCOSE BLD GHB EST-MCNC: 117 MG/DL
GLOBULIN SER CALC-MCNC: 3.3 G/DL (ref 2–4)
GLUCOSE SERPL-MCNC: 75 MG/DL (ref 65–100)
HBA1C MFR BLD: 5.7 % (ref 4–5.6)
HDLC SERPL-MCNC: 62 MG/DL
HDLC SERPL: 2.8 {RATIO} (ref 0–5)
LDLC SERPL CALC-MCNC: 101.4 MG/DL (ref 0–100)
POTASSIUM SERPL-SCNC: 4.1 MMOL/L (ref 3.5–5.1)
PROT SERPL-MCNC: 7.2 G/DL (ref 6.4–8.2)
SODIUM SERPL-SCNC: 140 MMOL/L (ref 136–145)
TRIGL SERPL-MCNC: 63 MG/DL (ref ?–150)
VLDLC SERPL CALC-MCNC: 12.6 MG/DL

## 2022-03-15 PROCEDURE — 1101F PT FALLS ASSESS-DOCD LE1/YR: CPT | Performed by: INTERNAL MEDICINE

## 2022-03-15 PROCEDURE — G8432 DEP SCR NOT DOC, RNG: HCPCS | Performed by: INTERNAL MEDICINE

## 2022-03-15 PROCEDURE — G8754 DIAS BP LESS 90: HCPCS | Performed by: INTERNAL MEDICINE

## 2022-03-15 PROCEDURE — G8752 SYS BP LESS 140: HCPCS | Performed by: INTERNAL MEDICINE

## 2022-03-15 PROCEDURE — 99214 OFFICE O/P EST MOD 30 MIN: CPT | Performed by: INTERNAL MEDICINE

## 2022-03-15 PROCEDURE — G8420 CALC BMI NORM PARAMETERS: HCPCS | Performed by: INTERNAL MEDICINE

## 2022-03-15 PROCEDURE — G8427 DOCREV CUR MEDS BY ELIG CLIN: HCPCS | Performed by: INTERNAL MEDICINE

## 2022-03-15 PROCEDURE — G8536 NO DOC ELDER MAL SCRN: HCPCS | Performed by: INTERNAL MEDICINE

## 2022-03-15 NOTE — PROGRESS NOTES
Chief Complaint   Patient presents with    Hypertension     3 month follow up    Diabetes    Cholesterol Problem     Visit Vitals  BP (!) 146/92 (BP 1 Location: Left upper arm, BP Patient Position: Sitting, BP Cuff Size: Adult)   Pulse 76   Temp 97.5 °F (36.4 °C) (Oral)   Resp 17   Ht 4' 11\" (1.499 m)   Wt 113 lb 12.8 oz (51.6 kg)   SpO2 97%   BMI 22.98 kg/m²     1. Have you been to the ER, urgent care clinic since your last visit? Hospitalized since your last visit? No    2. Have you seen or consulted any other health care providers outside of the [address] [place] [address] since your last visit? Include any pap smears or colon screening.  Dr. Oziel Monahan

## 2022-03-15 NOTE — PATIENT INSTRUCTIONS

## 2022-03-18 PROBLEM — E29.1 HYPOGONADISM MALE: Status: ACTIVE | Noted: 2017-08-05

## 2022-03-18 PROBLEM — R10.13 EPIGASTRIC PAIN: Status: ACTIVE | Noted: 2018-03-05

## 2022-03-18 PROBLEM — R07.9 CHEST PAIN: Status: ACTIVE | Noted: 2020-02-17

## 2022-03-18 PROBLEM — I25.10 ASCVD (ARTERIOSCLEROTIC CARDIOVASCULAR DISEASE): Status: ACTIVE | Noted: 2017-01-03

## 2022-03-18 PROBLEM — M79.641 PAIN OF RIGHT HAND: Status: ACTIVE | Noted: 2018-05-01

## 2022-03-19 PROBLEM — G62.9 NEUROPATHY: Status: ACTIVE | Noted: 2017-08-05

## 2022-03-19 PROBLEM — F41.9 ANXIETY: Status: ACTIVE | Noted: 2017-08-05

## 2022-03-19 PROBLEM — M54.2 NECK PAIN: Status: ACTIVE | Noted: 2019-12-11

## 2022-03-19 PROBLEM — I10 ESSENTIAL HYPERTENSION: Status: ACTIVE | Noted: 2017-01-03

## 2022-03-19 PROBLEM — Z13.39 ALCOHOL SCREENING: Status: ACTIVE | Noted: 2020-08-10

## 2022-03-19 PROBLEM — R00.1 BRADYCARDIA: Status: ACTIVE | Noted: 2017-08-05

## 2022-03-19 PROBLEM — E11.42 CONTROLLED TYPE 2 DIABETES MELLITUS WITH DIABETIC POLYNEUROPATHY, WITHOUT LONG-TERM CURRENT USE OF INSULIN (HCC): Status: ACTIVE | Noted: 2017-08-05

## 2022-03-19 PROBLEM — I42.9 CARDIOMYOPATHY (HCC): Status: ACTIVE | Noted: 2017-01-03

## 2022-03-19 PROBLEM — H11.31 SUBCONJUNCTIVAL HEMORRHAGE OF RIGHT EYE: Status: ACTIVE | Noted: 2020-07-02

## 2022-03-19 PROBLEM — E78.2 MIXED HYPERLIPIDEMIA: Status: ACTIVE | Noted: 2017-01-03

## 2022-03-19 PROBLEM — K57.90 DIVERTICULOSIS: Status: ACTIVE | Noted: 2017-08-05

## 2022-03-19 PROBLEM — K59.09 CONSTIPATION, CHRONIC: Status: ACTIVE | Noted: 2017-08-05

## 2022-03-20 PROBLEM — M15.0 PRIMARY OSTEOARTHRITIS INVOLVING MULTIPLE JOINTS: Status: ACTIVE | Noted: 2017-01-03

## 2022-03-20 PROBLEM — M15.9 PRIMARY OSTEOARTHRITIS INVOLVING MULTIPLE JOINTS: Status: ACTIVE | Noted: 2017-01-03

## 2022-03-20 PROBLEM — N52.9 ED (ERECTILE DYSFUNCTION): Status: ACTIVE | Noted: 2017-08-05

## 2022-04-30 DIAGNOSIS — I10 ESSENTIAL HYPERTENSION: ICD-10-CM

## 2022-05-02 RX ORDER — METOPROLOL SUCCINATE 25 MG/1
TABLET, EXTENDED RELEASE ORAL
Qty: 90 TABLET | Refills: 3 | Status: SHIPPED | OUTPATIENT
Start: 2022-05-02

## 2022-05-02 RX ORDER — RAMIPRIL 10 MG/1
CAPSULE ORAL
Qty: 180 CAPSULE | Refills: 3 | Status: SHIPPED | OUTPATIENT
Start: 2022-05-02

## 2022-05-02 NOTE — TELEPHONE ENCOUNTER
RX refill request from the patient/pharmacy. Patient last seen 03- with labs, and next appt. scheduled for 06-  Requested Prescriptions     Pending Prescriptions Disp Refills    ramipriL (ALTACE) 10 mg capsule [Pharmacy Med Name: RAMIPRIL 10 MG CAPSULE] 180 Capsule 3     Sig: TAKE 2 TABLETS BY MOUTH EVERY DAY    metoprolol succinate (TOPROL-XL) 25 mg XL tablet [Pharmacy Med Name: METOPROLOL SUCC ER 25 MG TAB] 90 Tablet 3     Sig: TAKE 1 TABLET BY MOUTH EVERY DAY   .

## 2022-06-26 NOTE — PROGRESS NOTES
Chief Complaint   Patient presents with    Diabetes     3 month f/up    Cholesterol Problem    GERD       SUBJECTIVE:    Juliet Antonio is a 79 y.o. male who returns in follow-up for his medical problems include hypertension, diabetes, hyperlipidemia, ASCVD, cardiomyopathy, DJD, and other medical problems. He is taking his medications and trying to follow his diet and try and remain physically active. He does intermittently note some chest discomfort the last 10 to 15 minutes it comes across his anterior chest which is sometimes described as a dullness and sometimes is a burning discomfort. He notes if he takes an aspirin seems to go away but he does also note that he can come with activity and continues without change if he continues with his activity he does not have to stop his activities with eventually go away. He also notes it can come on with rest as well. He denies any palpitations, PND, orthopnea or other cardiac or respiratory complaints. He notes no current GI or  complaints. He has no headaches, dizziness or neurologic complaints. His arthritic complaints seem to be taking care of by taking some Aleve which he takes 1 or twice a day with food. He denies any other complaints on complete review of systems. Current Outpatient Medications   Medication Sig Dispense Refill    amLODIPine (NORVASC) 5 mg tablet Take 1 Tablet by mouth daily. 30 Tablet prn    ramipriL (ALTACE) 10 mg capsule TAKE 2 TABLETS BY MOUTH EVERY  Capsule 3    metoprolol succinate (TOPROL-XL) 25 mg XL tablet TAKE 1 TABLET BY MOUTH EVERY DAY 90 Tablet 3    metFORMIN (GLUCOPHAGE) 500 mg tablet TAKE 1 TABLET BY MOUTH TWICE A DAY WITH MEALS 180 Tablet 3    atorvastatin (LIPITOR) 20 mg tablet Take 1 Tablet by mouth daily.  90 Tablet 3    polyethylene glycol (Purelax) 17 gram/dose powder MIX 17G IN WATER AND DRINK DAILY 850 g 5    fluoride, sodium, 1.1 % pste BRUSH WITH A PEA SIZED AMOUNT FOR 2 MINUTES BEFORE BEDTIME. DO NOT RINSE/DRINK/EAT AFTER BRUSHING.  Blood-Glucose Meter (True Metrix Glucose Meter) misc Use daily as needed for diabetes management. Diagnosis E11.9 1 Each 0    glucose blood VI test strips (True Metrix Glucose Test Strip) strip Use 1 to 2 times daily as needed for Diabetes management. Diagnosis is 411.9. 50 Strip 5    omeprazole (PRILOSEC) 20 mg capsule TAKE ONE CAPSULE BY MOUTH ONCE DAILY (Patient taking differently: as needed. TAKE ONE CAPSULE BY MOUTH ONCE DAILY) 90 Cap 3    OTHER       glucose blood VI test strips (TRUETEST TEST STRIPS) strip by Does Not Apply route See Admin Instructions. 100 Strip prn    UBIDECARENONE (CO Q-10 PO) Take  by mouth.  OTHER Take 7 Tabs by mouth daily. HEART HEALTH FROM [employer] [facility]      aspirin 81 mg tablet Take 81 mg by mouth daily.  Indications: taking one a day       Past Medical History:   Diagnosis Date    Anxiety 8/5/2017    Arrhythmia     Arthritis     Atherosclerotic heart disease of native coronary artery without angina pectoris 8/5/2017    Bradycardia 8/5/2017    CAD (coronary artery disease)     Constipation, chronic 8/5/2017    Diabetes ([address]     diet controlled    Diabetes mellitus ([address] 8/5/2017    Diverticulosis 8/5/2017    ED (erectile dysfunction) 8/5/2017    GERD (gastroesophageal reflux disease)     Hypertension     Hypogonadism male 8/5/2017    Kidney stone     Neuropathy 8/5/2017    On statin therapy 8/5/2017    Right foot pain 8/5/2017     Past Surgical History:   Procedure Laterality Date    COLONOSCOPY N/A 6/21/2016    COL[facility]COPY performed by V[facility] at [facility] END[facility]COPY    COLORECTAL SCRN; HI RISK IND  6/21/2016         HX GI      COLONOSCOPY    HX HEART CATHETERIZATION      HX OTHER SURGICAL      artificial testicle    HX PACEMAKER  10/6/15    [place] CARDIAC SURG PROCEDURE UNLIST      cabg x4 vessels 1999    [place] COLONOSCOPY FLX DX W/COLLJ SPEC WHEN PFRMD  4/8/2011         UPPER GI ENDOSCOPY,BIOPSY  12/14/2016          Allergies   Allergen Reactions    Caffeine Unknown (comments)     Sweating AND WOOZY AND CAN'T SLEEP    Codeine Other (comments)     Lightheaded, WOOZY AND CAN'T SLEEP    Percocet [Oxycodone-Acetaminophen] Nausea and Vomiting       REVIEW OF SYSTEMS:  General: negative for - chills or fever, or weight loss or gain  ENT: negative for - headaches, nasal congestion or tinnitus  Eyes: no blurred or visual changes  Neck: No stiffness or swollen nodes  Respiratory: negative for - cough, hemoptysis, shortness of breath or wheezing  Cardiovascular : negative for - edema, palpitations or shortness of breath.   Positive for chest pain as described above  Gastrointestinal: negative for - abdominal pain, blood in stools, heartburn or nausea/vomiting  Genito-Urinary: no dysuria, trouble voiding, or hematuria  Musculoskeletal: negative for - gait disturbance, joint pain, joint stiffness or joint swelling  Neurological: no TIA or stroke symptoms  Hematologic: no bruises, no bleeding  Lymphatic: no swollen glands  Integument: no lumps, mole changes, nail changes or rash  Endocrine:no malaise/lethargy poly uria or polydipsia or unexpected weight changes        Social History     Socioeconomic History    Marital status:    Tobacco Use    Smoking status: Never Smoker    Smokeless tobacco: Never Used   Vaping Use    Vaping Use: Never used   Substance and Sexual Activity    Alcohol use: No    Drug use: No     Family History   Problem Relation Age of Onset    Heart Disease Mother     No Known Problems Father     Cancer Sister     Diabetes Brother     Anesth Problems Neg Hx        OBJECTIVE:     Visit Vitals  BP (!) 182/88 (BP 1 Location: Left upper arm, BP Patient Position: Sitting, BP Cuff Size: Adult)   Pulse 60   Temp 98.6 °F (37 °C)   Resp 16   Ht 4' 11\" (1.499 m)   Wt 114 lb 9.6 oz (52 kg)   SpO2 97%   BMI 23.15 kg/m²     CONSTITUTIONAL:   well nourished, appears age appropriate  EYES: sclera anicteric, PERRL, EOMI  ENMT:nares clear, moist mucous membranes, pharynx clear  NECK: supple. Thyroid normal, No JVD or bruits  RESPIRATORY: Chest: clear to ascultation and percussion, normal inspiratory effort  CARDIOVASCULAR: Heart: regular rate and rhythm no murmurs, rubs or gallops, PMI not displaced, No thrills, no peripheral edema  GASTROINTESTINAL: Abdomen: non distended, soft, non tender, bowel sounds normal  HEMATOLOGIC: no purpura, petechiae or bruising  LYMPHATIC: No lymph node enlargemant  MUSCULOSKELETAL: Extremities: no active synovitis, pulse 1+   INTEGUMENT: No unusual rashes or suspicious skin lesions noted. Nails appear normal.  PERIPHERAL VASCULAR: normal pulses femoral, PT and DP  NEUROLOGIC: non-focal exam, A & O X 3  PSYCHIATRIC:, appropriate affect     ASSESSMENT:   1. Essential hypertension    2. Controlled type 2 diabetes mellitus with diabetic polyneuropathy, without long-term current use of insulin ([address]    3. Mixed hyperlipidemia    4. Cardiomyopathy, unspecified type ([address]    5. Primary osteoarthritis involving multiple joints    6. ASCVD (arteriosclerotic cardiovascular disease)    7. Anxiety    8. Chest pain, unspecified type      Impression  1. Accelerated hypertension poorly controlled. Add Norvasc 5 mg daily. Continue Altace and metoprolol XL  2. Diabetes mellitus repeat status pending and prior lab reviewed and I will adjust if needed. 3.  Hyperlipidemia prior lab reviewed repeat status is pending  4. Cardiomyopathy that clinically seems to be stable. 5. DJD stable  6. ASCVD-chest pain does not sound to be cardiac. He is still on aspirin 81 mg daily. EKG today shows no acute process with left bundle branch block. 7.  Anxiety that is stable  Rate chest pain as noted above  At this point I am adding the Norvasc for better control of his hypertension. I will get him back in a month reassess his hypertension.   If his chest discomfort changes in character or intensity then I think we need to evaluate further with a stress test but at this point it sounds atypical and probably not cardiac. 40 minutes spent in direct care of this patient today not including time spent on procedures. Greater than 50% in counseling coordination of care. I will see him sooner if there are problems. I will call with today's lab results. PLAN:  .  Orders Placed This Encounter    METABOLIC PANEL, COMPREHENSIVE    LIPID PANEL    CK    HEMOGLOBIN A1C WITH EAG    AMB POC EKG ROUTINE W/ 12 LEADS, INTER & REP    amLODIPine (NORVASC) 5 mg tablet         ATTENTION:   This medical record was transcribed using an electronic medical records system. Although proofread, it may and can contain electronic and spelling errors. Other human spelling and other errors may be present. Corrections may be executed at a later time. Please feel free to contact us for any clarifications as needed. Follow-up and Dispositions    · Return in about 4 weeks (around 7/25/2022). Follow-up and Disposition History         No results found for any visits on 06/27/22. Deloris Maher MD    The patient verbalized understanding of the problems and plans as explained.

## 2022-06-27 ENCOUNTER — OFFICE VISIT (OUTPATIENT)
Dept: INTERNAL MEDICINE CLINIC | Age: 82
End: 2022-06-27
Payer: MEDICARE

## 2022-06-27 VITALS
TEMPERATURE: 98.6 F | WEIGHT: 114.6 LBS | HEIGHT: 60 IN | BODY MASS INDEX: 22.5 KG/M2 | DIASTOLIC BLOOD PRESSURE: 88 MMHG | SYSTOLIC BLOOD PRESSURE: 182 MMHG | HEART RATE: 60 BPM | OXYGEN SATURATION: 97 % | RESPIRATION RATE: 16 BRPM

## 2022-06-27 DIAGNOSIS — I42.9 CARDIOMYOPATHY, UNSPECIFIED TYPE (HCC): ICD-10-CM

## 2022-06-27 DIAGNOSIS — I10 ESSENTIAL HYPERTENSION: Primary | ICD-10-CM

## 2022-06-27 DIAGNOSIS — R07.9 CHEST PAIN, UNSPECIFIED TYPE: ICD-10-CM

## 2022-06-27 DIAGNOSIS — E11.42 CONTROLLED TYPE 2 DIABETES MELLITUS WITH DIABETIC POLYNEUROPATHY, WITHOUT LONG-TERM CURRENT USE OF INSULIN (HCC): ICD-10-CM

## 2022-06-27 DIAGNOSIS — I25.10 ASCVD (ARTERIOSCLEROTIC CARDIOVASCULAR DISEASE): ICD-10-CM

## 2022-06-27 DIAGNOSIS — F41.9 ANXIETY: ICD-10-CM

## 2022-06-27 DIAGNOSIS — M15.9 PRIMARY OSTEOARTHRITIS INVOLVING MULTIPLE JOINTS: ICD-10-CM

## 2022-06-27 DIAGNOSIS — E78.2 MIXED HYPERLIPIDEMIA: ICD-10-CM

## 2022-06-27 LAB
ALBUMIN SERPL-MCNC: 3.6 G/DL (ref 3.5–5)
ALBUMIN/GLOB SERPL: 1.2 {RATIO} (ref 1.1–2.2)
ALP SERPL-CCNC: 89 U/L (ref 45–117)
ALT SERPL-CCNC: 24 U/L (ref 12–78)
ANION GAP SERPL CALC-SCNC: 5 MMOL/L (ref 5–15)
AST SERPL-CCNC: 21 U/L (ref 15–37)
BILIRUB SERPL-MCNC: 0.9 MG/DL (ref 0.2–1)
BUN SERPL-MCNC: 17 MG/DL (ref 6–20)
BUN/CREAT SERPL: 22 (ref 12–20)
CALCIUM SERPL-MCNC: 9.2 MG/DL (ref 8.5–10.1)
CHLORIDE SERPL-SCNC: 105 MMOL/L (ref 97–108)
CHOLEST SERPL-MCNC: 152 MG/DL
CK SERPL-CCNC: 102 U/L (ref 39–308)
CO2 SERPL-SCNC: 30 MMOL/L (ref 21–32)
CREAT SERPL-MCNC: 0.77 MG/DL (ref 0.7–1.3)
EST. AVERAGE GLUCOSE BLD GHB EST-MCNC: 120 MG/DL
GLOBULIN SER CALC-MCNC: 3 G/DL (ref 2–4)
GLUCOSE SERPL-MCNC: 73 MG/DL (ref 65–100)
HBA1C MFR BLD: 5.8 % (ref 4–5.6)
HDLC SERPL-MCNC: 60 MG/DL
HDLC SERPL: 2.5 {RATIO} (ref 0–5)
LDLC SERPL CALC-MCNC: 81.8 MG/DL (ref 0–100)
POTASSIUM SERPL-SCNC: 4.4 MMOL/L (ref 3.5–5.1)
PROT SERPL-MCNC: 6.6 G/DL (ref 6.4–8.2)
SODIUM SERPL-SCNC: 140 MMOL/L (ref 136–145)
TRIGL SERPL-MCNC: 51 MG/DL (ref ?–150)
VLDLC SERPL CALC-MCNC: 10.2 MG/DL

## 2022-06-27 PROCEDURE — 93000 ELECTROCARDIOGRAM COMPLETE: CPT | Performed by: INTERNAL MEDICINE

## 2022-06-27 PROCEDURE — G8754 DIAS BP LESS 90: HCPCS | Performed by: INTERNAL MEDICINE

## 2022-06-27 PROCEDURE — 1123F ACP DISCUSS/DSCN MKR DOCD: CPT | Performed by: INTERNAL MEDICINE

## 2022-06-27 PROCEDURE — 1101F PT FALLS ASSESS-DOCD LE1/YR: CPT | Performed by: INTERNAL MEDICINE

## 2022-06-27 PROCEDURE — G8510 SCR DEP NEG, NO PLAN REQD: HCPCS | Performed by: INTERNAL MEDICINE

## 2022-06-27 PROCEDURE — G8753 SYS BP > OR = 140: HCPCS | Performed by: INTERNAL MEDICINE

## 2022-06-27 PROCEDURE — 99215 OFFICE O/P EST HI 40 MIN: CPT | Performed by: INTERNAL MEDICINE

## 2022-06-27 PROCEDURE — G8536 NO DOC ELDER MAL SCRN: HCPCS | Performed by: INTERNAL MEDICINE

## 2022-06-27 PROCEDURE — 3044F HG A1C LEVEL LT 7.0%: CPT | Performed by: INTERNAL MEDICINE

## 2022-06-27 PROCEDURE — G8427 DOCREV CUR MEDS BY ELIG CLIN: HCPCS | Performed by: INTERNAL MEDICINE

## 2022-06-27 PROCEDURE — G8420 CALC BMI NORM PARAMETERS: HCPCS | Performed by: INTERNAL MEDICINE

## 2022-06-27 RX ORDER — AMLODIPINE BESYLATE 5 MG/1
5 TABLET ORAL DAILY
Qty: 30 TABLET | Status: SHIPPED | OUTPATIENT
Start: 2022-06-27

## 2022-06-27 NOTE — PROGRESS NOTES
Chief Complaint   Patient presents with    Diabetes     3 month f/up    Cholesterol Problem    GERD     1. Have you been to the ER, urgent care clinic since your last visit? Hospitalized since your last visit? No    2. Have you seen or consulted any other health care providers outside of the [address] [place] [address] since your last visit? Include any pap smears or colon screening.  No

## 2022-06-27 NOTE — PATIENT INSTRUCTIONS
Arthritis: Care Instructions  Overview     Arthritis, also called osteoarthritis, is a breakdown of the cartilage that cushions your joints. When the cartilage wears down, your bones rub against each other. This causes pain and stiffness. Many people have some arthritis as they age. Arthritis most often affects the joints of the spine, hands, hips, knees, or feet. Arthritis never goes away completely. But medicine and home treatment can help reduce pain and help you stay active. Follow-up care is a key part of your treatment and safety. Be sure to make and go to all appointments, and call your doctor if you are having problems. It's also a good idea to know your test results and keep a list of the medicines you take. How can you care for yourself at home? · Stay at a healthy weight. Being overweight puts extra strain on your joints. · Talk to your doctor or physical therapist about exercises that will help ease joint pain. ? Stretch. You may enjoy gentle forms of yoga to help keep your joints and muscles flexible. ? Walk instead of jog. Other types of exercise that are less stressful on the joints include riding a bike, swimming, barbara chi, or water exercise. ? Lift weights. Strong muscles help reduce stress on your joints. Stronger thigh muscles, for example, take some of the stress off of the knees and hips. Learn the right way to lift weights so you don't make joint pain worse. · Take your medicines exactly as prescribed. Call your doctor if you think you are having a problem with your medicine. · Take pain medicines exactly as directed. ? If the doctor gave you a prescription medicine for pain, take it as prescribed. ? If you are not taking a prescription pain medicine, ask your doctor if you can take an over-the-counter medicine. · Use a cane, crutch, walker, or another device if you need help to get around. These can help rest your joints.  You also can use other things to make life easier, such as a higher toilet seat and padded handles on kitchen utensils. · Do not sit in low chairs. They can make it hard to get up. · Put heat or cold on your sore joints as needed. Use whichever helps you most. You can also switch between hot and cold packs. ? Apply heat 2 or 3 times a day for 20 to 30 minutesusing a heating pad, hot shower, or hot packto relieve pain and stiffness. But don't use heat on a swollen joint. ? Put ice or a cold pack on your sore joint for 10 to 20 minutes at a time. Put a thin cloth between the ice and your skin. When should you call for help? Call your doctor now or seek immediate medical care if:    · You have sudden swelling, warmth, or pain in any joint.     · You have joint pain and a fever or rash.     · You have such bad pain that you cannot use a joint. Watch closely for changes in your health, and be sure to contact your doctor if:    · You have mild joint symptoms that continue even with more than 6 weeks of care at home.     · You have stomach pain or other problems with your medicine. Where can you learn more? Go to http://www.gray.com/  Enter R999 in the search box to learn more about \"Arthritis: Care Instructions. \"  Current as of: December 20, 2021               Content Version: 13.2  © 0134-3884 [employer]. Care instructions adapted under license by [employer] (which disclaims liability or warranty for this information). If you have questions about a medical condition or this instruction, always ask your healthcare professional. [place] [address] any warranty or liability for your use of this information.

## 2022-07-27 ENCOUNTER — OFFICE VISIT (OUTPATIENT)
Dept: INTERNAL MEDICINE CLINIC | Age: 82
End: 2022-07-27
Payer: MEDICARE

## 2022-07-27 VITALS
BODY MASS INDEX: 22.28 KG/M2 | WEIGHT: 113.5 LBS | DIASTOLIC BLOOD PRESSURE: 76 MMHG | OXYGEN SATURATION: 98 % | RESPIRATION RATE: 16 BRPM | HEIGHT: 60 IN | SYSTOLIC BLOOD PRESSURE: 132 MMHG | TEMPERATURE: 98 F | HEART RATE: 70 BPM

## 2022-07-27 DIAGNOSIS — I42.9 CARDIOMYOPATHY, UNSPECIFIED TYPE (HCC): ICD-10-CM

## 2022-07-27 DIAGNOSIS — I10 ESSENTIAL HYPERTENSION: Primary | ICD-10-CM

## 2022-07-27 PROCEDURE — G8427 DOCREV CUR MEDS BY ELIG CLIN: HCPCS | Performed by: INTERNAL MEDICINE

## 2022-07-27 PROCEDURE — 1123F ACP DISCUSS/DSCN MKR DOCD: CPT | Performed by: INTERNAL MEDICINE

## 2022-07-27 PROCEDURE — G8752 SYS BP LESS 140: HCPCS | Performed by: INTERNAL MEDICINE

## 2022-07-27 PROCEDURE — 1101F PT FALLS ASSESS-DOCD LE1/YR: CPT | Performed by: INTERNAL MEDICINE

## 2022-07-27 PROCEDURE — 99213 OFFICE O/P EST LOW 20 MIN: CPT | Performed by: INTERNAL MEDICINE

## 2022-07-27 PROCEDURE — G8536 NO DOC ELDER MAL SCRN: HCPCS | Performed by: INTERNAL MEDICINE

## 2022-07-27 PROCEDURE — G8420 CALC BMI NORM PARAMETERS: HCPCS | Performed by: INTERNAL MEDICINE

## 2022-07-27 PROCEDURE — G8510 SCR DEP NEG, NO PLAN REQD: HCPCS | Performed by: INTERNAL MEDICINE

## 2022-07-27 PROCEDURE — G8754 DIAS BP LESS 90: HCPCS | Performed by: INTERNAL MEDICINE

## 2022-07-27 NOTE — PROGRESS NOTES
Chief Complaint   Patient presents with    Hypertension     1 month   BP  follow up       SUBJECTIVE:    Rajiv Patricio is a 79 y.o. male who returns in follow-up regarding his hypertension as last visit his blood pressure was markedly elevated so we added Norvasc 5 mg daily. He has been taking that and tolerating quite well. He does have underlying cardiomyopathy but has no evidence of congestive heart failure. He denies any chest pain, shortness of breath, palpitations, PND, orthopnea or other cardiac or respiratory complaints. He notes no GI or  complaints. He notes no headaches, dizziness or neurologic complaints. He has no current active arthritic complaints and there are no other complaints on complete review of systems. Current Outpatient Medications   Medication Sig Dispense Refill    amLODIPine (NORVASC) 5 mg tablet Take 1 Tablet by mouth daily. 30 Tablet prn    ramipriL (ALTACE) 10 mg capsule TAKE 2 TABLETS BY MOUTH EVERY  Capsule 3    metoprolol succinate (TOPROL-XL) 25 mg XL tablet TAKE 1 TABLET BY MOUTH EVERY DAY 90 Tablet 3    metFORMIN (GLUCOPHAGE) 500 mg tablet TAKE 1 TABLET BY MOUTH TWICE A DAY WITH MEALS 180 Tablet 3    atorvastatin (LIPITOR) 20 mg tablet Take 1 Tablet by mouth daily. 90 Tablet 3    polyethylene glycol (Purelax) 17 gram/dose powder MIX 17G IN WATER AND DRINK DAILY 850 g 5    fluoride, sodium, 1.1 % pste BRUSH WITH A PEA SIZED AMOUNT FOR 2 MINUTES BEFORE BEDTIME. DO NOT RINSE/DRINK/EAT AFTER BRUSHING. Blood-Glucose Meter (True Metrix Glucose Meter) misc Use daily as needed for diabetes management. Diagnosis E11.9 1 Each 0    glucose blood VI test strips (True Metrix Glucose Test Strip) strip Use 1 to 2 times daily as needed for Diabetes management. Diagnosis is 411.9. 50 Strip 5    omeprazole (PRILOSEC) 20 mg capsule TAKE ONE CAPSULE BY MOUTH ONCE DAILY (Patient taking differently: as needed.  TAKE ONE CAPSULE BY MOUTH ONCE DAILY) 90 Cap 3    OTHER glucose blood VI test strips (TRUETEST TEST STRIPS) strip by Does Not Apply route See Admin Instructions. 100 Strip prn    UBIDECARENONE (CO Q-10 PO) Take  by mouth. OTHER Take 7 Tabs by mouth daily. [facility] FROM [facility]      aspirin 81 mg tablet Take 81 mg by mouth daily.  Indications: taking one a day       Past Medical History:   Diagnosis Date    Anxiety 8/5/2017    Arrhythmia     Arthritis     Atherosclerotic heart disease of native coronary artery without angina pectoris 8/5/2017    Bradycardia 8/5/2017    CAD (coronary artery disease)     Constipation, chronic 8/5/2017    Diabetes ([address]     diet controlled    Diabetes mellitus ([address] 8/5/2017    Diverticulosis 8/5/2017    ED (erectile dysfunction) 8/5/2017    GERD (gastroesophageal reflux disease)     Hypertension     Hypogonadism male 8/5/2017    Kidney stone     Neuropathy 8/5/2017    On statin therapy 8/5/2017    Right foot pain 8/5/2017     Past Surgical History:   Procedure Laterality Date    COLONOSCOPY N/A 6/21/2016    COLONOSCOPY performed by Amaris Vences., MD at [address]; HI RISK IND  6/21/2016         HX GI      COLONOSCOPY    HX HEART CATHETERIZATION      HX OTHER SURGICAL      artificial testicle    HX PACEMAKER  10/6/15    [place] CARDIAC SURG PROCEDURE UNLIST      cabg x4 vessels 1999    [place] COLONOSCOPY FLX DX W/COLLJ SPEC WHEN PFRMD  4/8/2011         UPPER GI ENDOSCOPY,BIOPSY  12/14/2016          Allergies   Allergen Reactions    Caffeine Unknown (comments)     Sweating AND WOOZY AND CAN'T SLEEP    Codeine Other (comments)     Lightheaded, WOOZY AND CAN'T SLEEP    Percocet [Oxycodone-Acetaminophen] Nausea and Vomiting       REVIEW OF SYSTEMS:  General: negative for - chills or fever, or weight loss or gain  ENT: negative for - headaches, nasal congestion or tinnitus  Eyes: no blurred or visual changes  Neck: No stiffness or swollen nodes  Respiratory: negative for - cough, hemoptysis, shortness of breath or wheezing  Cardiovascular : negative for - chest pain, edema, palpitations or shortness of breath  Gastrointestinal: negative for - abdominal pain, blood in stools, heartburn or nausea/vomiting  Genito-Urinary: no dysuria, trouble voiding, or hematuria  Musculoskeletal: negative for - gait disturbance, joint pain, joint stiffness or joint swelling  Neurological: no TIA or stroke symptoms  Hematologic: no bruises, no bleeding  Lymphatic: no swollen glands  Integument: no lumps, mole changes, nail changes or rash  Endocrine:no malaise/lethargy poly uria or polydipsia or unexpected weight changes        Social History     Socioeconomic History    Marital status:    Tobacco Use    Smoking status: Never    Smokeless tobacco: Never   Vaping Use    Vaping Use: Never used   Substance and Sexual Activity    Alcohol use: No    Drug use: No     Family History   Problem Relation Age of Onset    Heart Disease Mother     No Known Problems Father     Cancer Sister     Anesth Problems Neg Hx        OBJECTIVE:     Visit Vitals  /76   Pulse 70   Temp 98 °F (36.7 °C) (Oral)   Resp 16   Ht 4' 11\" (1.499 m)   Wt 113 lb 8 oz (51.5 kg)   SpO2 98%   BMI 22.92 kg/m²     CONSTITUTIONAL:   well nourished, appears age appropriate  EYES: sclera anicteric, PERRL, EOMI  ENMT:nares clear, moist mucous membranes, pharynx clear  NECK: supple. Thyroid normal, No JVD or bruits  RESPIRATORY: Chest: clear to ascultation and percussion, normal inspiratory effort  CARDIOVASCULAR: Heart: regular rate and rhythm no murmurs, rubs or gallops, PMI not displaced, No thrills, no peripheral edema  GASTROINTESTINAL: Abdomen: non distended, soft, non tender, bowel sounds normal  HEMATOLOGIC: no purpura, petechiae or bruising  LYMPHATIC: No lymph node enlargemant  MUSCULOSKELETAL: Extremities: no active synovitis, pulse 1+   INTEGUMENT: No unusual rashes or suspicious skin lesions noted.  Nails appear normal.  PERIPHERAL VASCULAR: normal pulses femoral, PT and DP  NEUROLOGIC: non-focal exam, A & O X 3  PSYCHIATRIC:, appropriate affect     ASSESSMENT:   1. Essential hypertension    2. Cardiomyopathy, unspecified type ([address]      Impression  1. Hypertension now that is well controlled so continue current therapy  2. Cardiomyopathy stable  Follow-up scheduled again 2 months fasting or sooner if there are problems. PLAN:  . No orders of the defined types were placed in this encounter. ATTENTION:   This medical record was transcribed using an electronic medical records system. Although proofread, it may and can contain electronic and spelling errors. Other human spelling and other errors may be present. Corrections may be executed at a later time. Please feel free to contact us for any clarifications as needed. Follow-up and Dispositions    Return in about 2 months (around 9/27/2022). No results found for any visits on 07/27/22. Oralee MD Boris    The patient verbalized understanding of the problems and plans as explained.

## 2022-07-27 NOTE — PROGRESS NOTES
HIPAA verified by two patient identifiers. Phi Thomson is a 79 y.o. male    Chief Complaint   Patient presents with    Hypertension     1 month   BP  follow up       Visit Vitals  /70 (BP 1 Location: Left upper arm, BP Patient Position: Sitting, BP Cuff Size: Small adult)   Pulse 70   Temp 98 °F (36.7 °C) (Oral)   Resp 16   Ht 4' 11\" (1.499 m)   Wt 113 lb 8 oz (51.5 kg)   SpO2 98%   BMI 22.92 kg/m²       Pain Scale: 0 - No pain/10  Pain Location:       Health Maintenance Due   Topic Date Due    DTaP/Tdap/Td series (1 - Tdap) Never done    Shingrix Vaccine Age 49> (1 of 2) Never done    COVID-19 Vaccine (4 - Booster for Pfizer series) 04/08/2022         Coordination of Care Questionnaire:  :   1) Have you been to an emergency room, urgent care, or hospitalized since your last visit? If yes, where when, and reason for visit? no       2. Have seen or consulted any other health care provider since your last visit? If yes, where when, and reason for visit? NO      Patient is accompanied by self I have received verbal consent from Phi Thomson to discuss any/all medical information while they are present in the room.

## 2022-07-28 RX ORDER — ATORVASTATIN CALCIUM 20 MG/1
TABLET, FILM COATED ORAL
Qty: 90 TABLET | Refills: 3 | Status: SHIPPED | OUTPATIENT
Start: 2022-07-28

## 2022-07-28 NOTE — TELEPHONE ENCOUNTER
RX refill request from the patient/pharmacy. Patient last seen 07- with labs, and next appt. scheduled for 09-  Requested Prescriptions     Pending Prescriptions Disp Refills    atorvastatin (LIPITOR) 20 mg tablet [Pharmacy Med Name: ATORVASTATIN 20 MG TABLET] 90 Tablet 3     Sig: TAKE 1 TABLET BY MOUTH EVERY DAY    .

## 2022-09-26 PROBLEM — E55.9 VITAMIN D DEFICIENCY: Status: ACTIVE | Noted: 2022-09-26

## 2022-09-26 PROBLEM — Z12.5 PROSTATE CANCER SCREENING: Status: ACTIVE | Noted: 2021-09-14

## 2022-09-26 PROBLEM — H11.31 SUBCONJUNCTIVAL HEMORRHAGE OF RIGHT EYE: Status: RESOLVED | Noted: 2020-07-02 | Resolved: 2022-09-26

## 2022-09-26 PROBLEM — Z00.00 MEDICARE ANNUAL WELLNESS VISIT, SUBSEQUENT: Status: ACTIVE | Noted: 2017-08-07

## 2022-09-26 PROBLEM — M79.641 PAIN OF RIGHT HAND: Status: RESOLVED | Noted: 2018-05-01 | Resolved: 2022-09-26

## 2022-09-26 NOTE — PROGRESS NOTES
This is a Subsequent Medicare Annual Wellness Visit providing Personalized Prevention Plan Services (PPPS) (Performed 12 months after initial AWV and PPPS )    I have reviewed the patient's medical history in detail and updated the computerized patient record. He presents today for his Medicare subsequent annual wellness examination and screening questionnaire. He is also in follow-up of his multiple medical problems include hypertension, diabetes, hyperlipidemia, ASCVD, cardiomyopathy, DJD, history of bradycardia, diverticulosis and other mild medical problems. He is taking his medications trying to follow his diet try and remain physically active. He currently denies any chest pain, shortness of breath, palpitations, PND, orthopnea or other cardiac or respiratory complaints. He notes no current GI or  complaints. He has no headaches, dizziness or neurologic complaints. He has no change of his chronic arthritic complaints and there are no other complaints on complete review of systems.     History     Past Medical History:   Diagnosis Date    Anxiety 8/5/2017    Arrhythmia     Arthritis     Atherosclerotic heart disease of native coronary artery without angina pectoris 8/5/2017    Bradycardia 8/5/2017    CAD (coronary artery disease)     Constipation, chronic 8/5/2017    Diabetes ([facility] [address]     diet controlled    Diabetes mellitus ([address] 8/5/2017    Diverticulosis 8/5/2017    ED (erectile dysfunction) 8/5/2017    GERD (gastroesophageal reflux disease)     Hypertension     Hypogonadism male 8/5/2017    Kidney stone     Neuropathy 8/5/2017    On statin therapy 8/5/2017    Right foot pain 8/5/2017      Past Surgical History:   Procedure Laterality Date    COLONOSCOPY N/A 6/21/2016    COLONOSCOPY performed by Goyo Garza MD at [address]; HI RISK IND  6/21/2016         HX GI      COLONOSCOPY    HX HEART CATHETERIZATION      HX OTHER SURGICAL      artificial testicle    HX PACEMAKER  10/6/15    RI CARDIAC SURG PROCEDURE UNLIST      cabg x4 vessels 1999    RI COLONOSCOPY FLX DX W/COLLJ SPEC WHEN PFRMD  4/8/2011         UPPER GI ENDOSCOPY,BIOPSY  12/14/2016          Social History     Tobacco Use    Smoking status: Never    Smokeless tobacco: Never   Vaping Use    Vaping Use: Never used   Substance Use Topics    Alcohol use: No    Drug use: No     Current Outpatient Medications   Medication Sig Dispense Refill    atorvastatin (LIPITOR) 20 mg tablet TAKE 1 TABLET BY MOUTH EVERY DAY 90 Tablet 3    amLODIPine (NORVASC) 5 mg tablet Take 1 Tablet by mouth daily. 30 Tablet prn    ramipriL (ALTACE) 10 mg capsule TAKE 2 TABLETS BY MOUTH EVERY  Capsule 3    metoprolol succinate (TOPROL-XL) 25 mg XL tablet TAKE 1 TABLET BY MOUTH EVERY DAY 90 Tablet 3    metFORMIN (GLUCOPHAGE) 500 mg tablet TAKE 1 TABLET BY MOUTH TWICE A DAY WITH MEALS 180 Tablet 3    polyethylene glycol (Purelax) 17 gram/dose powder MIX 17G IN WATER AND DRINK DAILY 850 g 5    fluoride, sodium, 1.1 % pste BRUSH WITH A PEA SIZED AMOUNT FOR 2 MINUTES BEFORE BEDTIME. DO NOT RINSE/DRINK/EAT AFTER BRUSHING. Blood-Glucose Meter (True Metrix Glucose Meter) misc Use daily as needed for diabetes management. Diagnosis E11.9 1 Each 0    glucose blood VI test strips (True Metrix Glucose Test Strip) strip Use 1 to 2 times daily as needed for Diabetes management. Diagnosis is 411.9. 50 Strip 5    omeprazole (PRILOSEC) 20 mg capsule TAKE ONE CAPSULE BY MOUTH ONCE DAILY (Patient taking differently: as needed. TAKE ONE CAPSULE BY MOUTH ONCE DAILY) 90 Cap 3    OTHER       glucose blood VI test strips (TRUETEST TEST STRIPS) strip by Does Not Apply route See Admin Instructions. 100 Strip prn    UBIDECARENONE (CO Q-10 PO) Take  by mouth. OTHER Take 7 Tabs by mouth daily. [facility]      aspirin 81 mg tablet Take 81 mg by mouth daily.  Indications: taking one a day       Allergies   Allergen Reactions    Caffeine Unknown (comments)     Sweating AND WOOZY AND CAN'T SLEEP    Codeine Other (comments)     Lightheaded, WOOZY AND CAN'T SLEEP    Percocet [Oxycodone-Acetaminophen] Nausea and Vomiting     Family History   Problem Relation Age of Onset    Heart Disease Mother     No Known Problems Father     Cancer Sister     Anesth Problems Neg Hx        Patient Active Problem List    Diagnosis    Controlled type 2 diabetes mellitus with diabetic polyneuropathy, without long-term current use of insulin ([facility] [address]    Cardiomyopathy ([facility] [address]    ASCVD (arteriosclerotic cardiovascular disease)    Essential hypertension    Mixed hyperlipidemia    Primary osteoarthritis involving multiple joints    Anxiety    Vitamin D deficiency    Prostate cancer screening    Alcohol screening    Chest pain    Neck pain    Epigastric pain    Medicare annual wellness visit, subsequent    Diverticulosis    Neuropathy    Hypogonadism male    ED (erectile dysfunction)    Constipation, chronic    Bradycardia       Patient Care Team:  Michael Spivey MD as PCP - General (Internal Medicine Physician)  Michael Spivey MD as PCP - [facility] Empaneled Provider    Depression Risk Factor Screening:     3 most recent PHQ Screens 9/27/2022   Little interest or pleasure in doing things Not at all   Feeling down, depressed, irritable, or hopeless Not at all   Total Score PHQ 2 0     Alcohol Risk Factor Screening: You do not drink alcohol or very rarely. Functional Ability and Level of Safety:     Fall Risk     Fall Risk Assessment, last 12 mths 9/27/2022   Able to walk? Yes   Fall in past 12 months? 0   Do you feel unsteady? 0   Are you worried about falling 0   Is TUG test greater than 12 seconds? -   Is the gait abnormal? -       Hearing Loss   mild    Activities of Daily Living   Self-care.    ADL Assessment 9/27/2022   Feeding yourself No Help Needed   Getting from bed to chair No Help Needed   Getting dressed No Help Needed   Bathing or showering No Help Needed Walk across the room (includes cane/walker) No Help Needed   Using the telphone No Help Needed   Taking your medications No Help Needed   Preparing meals No Help Needed   Managing money (expenses/bills) No Help Needed   Moderately strenuous housework (laundry) No Help Needed   Shopping for personal items (toiletries/medicines) No Help Needed   Shopping for groceries No Help Needed   Driving No Help Needed   Climbing a flight of stairs No Help Needed   Getting to places beyond walking distances No Help Needed       Abuse Screen   Patient is not abused    Social History     Social History Narrative    Not on file       Review of Systems      ROS:    Constitutional: He denies fevers, weight loss, sweats. Eyes: No blurred or double vision. ENT: No difficulty with swallowing, taste, speech or smell. Neck: no stiffness or swelling  Respiratory: No cough wheezing or shortness of breath. Cardiovascular: Denies chest pain, palpitations, unexplained indigestion or syncope. Gastrointestinal:  No changes in bowel movements, no abdominal pain, no bloating. Genitourinary:  He denies frequency, nocturia or stranguria. Extremities: No joint pain, stiffness or swelling. Neurological:  No numbness, tingling, burring paresthesias or loss of motor strength. No syncope, dizziness or frequent headache  Lymphatic: no adenopathy noted  Hematologic: no easy bruising or bleeding gums  Skin:  No recent rashes or mole changes. Psychiatric/Behavioral:  Negative for depression.        Physical Examination     Evaluation of Cognitive Function:  Mood/affect:  happy  Appearance: age appropriate  Family member/caregiver input: None    Vitals:    09/27/22 1011   BP: 120/80   Pulse: 72   Resp: 16   Temp: 98.3 °F (36.8 °C)   TempSrc: Oral   SpO2: 98%   Weight: 113 lb 6.4 oz (51.4 kg)   Height: 4' 11\" (1.499 m)   PainSc:   0 - No pain        PHYSICAL EXAM:    General appearance - alert, well appearing, and in no distress  Mental status - alert, oriented to person, place, and time  HEENT:  Ears - bilateral TM's and external ear canals clear  Eyes - pupillary responses were normal.  Extraocular muscle function intact. Lids and conjunctiva not injected. Fundoscopic exam revealed sharp disc margins. eye movements intact  Pharynx- clear with teeth in good repair. No masses were noted  Neck - supple without thyromegaly or burit. No JVD noted  Lungs - clear to auscultation and percussion  Cardiac- normal rate, regular rhythm without murmurs. PMI not displaced. No gallop, rub or click  Abdomen - flat, soft, non-tender without palpable organomegaly or mass. No pulsatile mass was felt, and not bruit was heard. Bowel sounds were active  : Circumcised, Testes descended w/o masses  Rectal: normal sphincter tone, prostate 1+ enlarged, smooth and nontender without nodules, no masses, stool brown and hemacult negative  Extremities -  no clubbing cyanosis or edema  Lymphatics - no palpable lymphadenopathy, no hepatosplenomegaly  Hematologic: no petechiae or purpura  Peripheral vascular -Femoral, Dorsalis pedis and posterior tibial pulses felt without difficulty  Skin - no rash or unusual mole change noted  Neurological - Cranial nerves II-XII grossly intact. Motor strength 5/5. DTR's 2+ and symmetric.   Station and gait normal  Back exam - full range of motion, no tenderness, palpable spasm or pain on motion  Musculoskeletal - no joint tenderness, deformity or swelling       Results for orders placed or performed in visit on 06/27/22   HEMOGLOBIN A1C WITH EAG   Result Value Ref Range    Hemoglobin A1c 5.8 (H) 4.0 - 5.6 %    Est. average glucose 120 mg/dL   CK   Result Value Ref Range     39 - 308 U/L   LIPID PANEL   Result Value Ref Range    Cholesterol, total 152 <200 MG/DL    Triglyceride 51 <150 MG/DL    HDL Cholesterol 60 MG/DL    LDL, calculated 81.8 0 - 100 MG/DL    VLDL, calculated 10.2 MG/DL    CHOL/HDL Ratio 2.5 0.0 - 5.0     METABOLIC PANEL, COMPREHENSIVE   Result Value Ref Range    Sodium 140 136 - 145 mmol/L    Potassium 4.4 3.5 - 5.1 mmol/L    Chloride 105 97 - 108 mmol/L    CO2 30 21 - 32 mmol/L    Anion gap 5 5 - 15 mmol/L    Glucose 73 65 - 100 mg/dL    BUN 17 6 - 20 MG/DL    Creatinine 0.77 0.70 - 1.30 MG/DL    BUN/Creatinine ratio 22 (H) 12 - 20      GFR est AA >60 >60 ml/min/1.73m2    GFR est non-AA >60 >60 ml/min/1.73m2    Calcium 9.2 8.5 - 10.1 MG/DL    Bilirubin, total 0.9 0.2 - 1.0 MG/DL    ALT (SGPT) 24 12 - 78 U/L    AST (SGOT) 21 15 - 37 U/L    Alk. phosphatase 89 45 - 117 U/L    Protein, total 6.6 6.4 - 8.2 g/dL    Albumin 3.6 3.5 - 5.0 g/dL    Globulin 3.0 2.0 - 4.0 g/dL    A-G Ratio 1.2 1.1 - 2.2         Advice/Referrals/Counseling   Education and counseling provided:  Are appropriate based on today's review and evaluation  End-of-Life planning (with patient's consent)  Pneumococcal Vaccine  Influenza Vaccine  Colorectal cancer screening tests      Assessment/Plan     ASSESSMENT:   1. Essential hypertension    2. Controlled type 2 diabetes mellitus with diabetic polyneuropathy, without long-term current use of insulin ([facility] [address]    3. Mixed hyperlipidemia    4. Cardiomyopathy, unspecified type ([facility] [address]    5. ASCVD (arteriosclerotic cardiovascular disease)    6. Bradycardia    7. Primary osteoarthritis involving multiple joints    8. Anxiety    9. Diverticulosis    10. Neuropathy    11. Vitamin D deficiency    12. Prostate cancer screening    13. Alcohol screening    14. Medicare annual wellness visit, subsequent    15. Needs flu shot      Impression  1. Hypertension that is controlled so continue current therapy reviewed with him. 2.  Diabetes repeat status pending prior lab reviewed  3. Hyperlipidemia prior lab reviewed repeat status pending  4. Cardiomyopathy clinically stable no evidence of heart failure  5. ASCVD clinically stable and EKG obtained today no acute process. 6.  Bradycardia stable  7. DJD that is stable  8. Anxiety chronic but stable  9. Diverticulosis no recent symptoms  10. Neuropathy chronic but stable  11. Vitamin D deficiency repeat status pending  12. Prostate cancer screening done today with PSA pending  13. Annual alcohol screening is done he claims to drink no alcohol at all. We did spend 8 minutes discussing complications of males who average more than 2 drinks per day with increased cardiovascular risk and increased risk of liver disease and other GI problems. Flu shot given today. Medicare subsequent annual wellness examination and screening questionnaire was completed today. The results were reviewed with him and his questions were answered. Lifestyle recommendations modifications discussed and made. I will call with lab and follow stable continue same and follow-up in 3 months or sooner if there is a problem. PLAN:  .  Orders Placed This Encounter    Influenza Vaccine, QUAD, 65 Yrs +  IM  (Fluad 81161 )    CBC WITH AUTOMATED DIFF    METABOLIC PANEL, COMPREHENSIVE    LIPID PANEL    CK    HEMOGLOBIN A1C WITH EAG    T4 (THYROXINE)    TSH 3RD GENERATION    URINALYSIS W/ REFLEX CULTURE    VITAMIN D, 25 HYDROXY    PSA SCREENING (SCREENING)    MICROALBUMIN, UR, RAND W/ MICROALB/CREAT RATIO    AMB POC EKG ROUTINE W/ 12 LEADS, INTER & REP         ATTENTION:   This medical record was transcribed using an electronic medical records system. Although proofread, it may and can contain electronic and spelling errors. Other human spelling and other errors may be present. Corrections may be executed at a later time. Please feel free to contact us for any clarifications as needed. Holly Barber MD       Recommended healthy diet low in carbohydrates, fats, sodium and cholesterol. Recommended regular cardiovascular exercise 3-6 times per week for 30-60 minutes daily.       Current Outpatient Medications   Medication Sig Dispense Refill    atorvastatin (LIPITOR) 20 mg tablet TAKE 1 TABLET BY MOUTH EVERY DAY 90 Tablet 3    amLODIPine (NORVASC) 5 mg tablet Take 1 Tablet by mouth daily. 30 Tablet prn    ramipriL (ALTACE) 10 mg capsule TAKE 2 TABLETS BY MOUTH EVERY  Capsule 3    metoprolol succinate (TOPROL-XL) 25 mg XL tablet TAKE 1 TABLET BY MOUTH EVERY DAY 90 Tablet 3    metFORMIN (GLUCOPHAGE) 500 mg tablet TAKE 1 TABLET BY MOUTH TWICE A DAY WITH MEALS 180 Tablet 3    polyethylene glycol (Purelax) 17 gram/dose powder MIX 17G IN WATER AND DRINK DAILY 850 g 5    fluoride, sodium, 1.1 % pste BRUSH WITH A PEA SIZED AMOUNT FOR 2 MINUTES BEFORE BEDTIME. DO NOT RINSE/DRINK/EAT AFTER BRUSHING. Blood-Glucose Meter (True Metrix Glucose Meter) misc Use daily as needed for diabetes management. Diagnosis E11.9 1 Each 0    glucose blood VI test strips (True Metrix Glucose Test Strip) strip Use 1 to 2 times daily as needed for Diabetes management. Diagnosis is 411.9. 50 Strip 5    omeprazole (PRILOSEC) 20 mg capsule TAKE ONE CAPSULE BY MOUTH ONCE DAILY (Patient taking differently: as needed. TAKE ONE CAPSULE BY MOUTH ONCE DAILY) 90 Cap 3    OTHER       glucose blood VI test strips (TRUETEST TEST STRIPS) strip by Does Not Apply route See Admin Instructions. 100 Strip prn    UBIDECARENONE (CO Q-10 PO) Take  by mouth. OTHER Take 7 Tabs by mouth daily. [facility] FROM [facility]      aspirin 81 mg tablet Take 81 mg by mouth daily. Indications: taking one a day         No results found for any visits on 09/27/22. Verbal and written instructions (see AVS) provided. Patient expresses understanding of diagnosis and treatment plan.     Jonathan Nelson MD

## 2022-09-27 ENCOUNTER — OFFICE VISIT (OUTPATIENT)
Dept: INTERNAL MEDICINE CLINIC | Age: 82
End: 2022-09-27
Payer: MEDICARE

## 2022-09-27 VITALS
OXYGEN SATURATION: 98 % | SYSTOLIC BLOOD PRESSURE: 120 MMHG | DIASTOLIC BLOOD PRESSURE: 80 MMHG | HEIGHT: 60 IN | BODY MASS INDEX: 22.26 KG/M2 | RESPIRATION RATE: 16 BRPM | WEIGHT: 113.4 LBS | TEMPERATURE: 98.3 F | HEART RATE: 72 BPM

## 2022-09-27 DIAGNOSIS — M15.9 PRIMARY OSTEOARTHRITIS INVOLVING MULTIPLE JOINTS: ICD-10-CM

## 2022-09-27 DIAGNOSIS — G62.9 NEUROPATHY: ICD-10-CM

## 2022-09-27 DIAGNOSIS — I25.10 ASCVD (ARTERIOSCLEROTIC CARDIOVASCULAR DISEASE): ICD-10-CM

## 2022-09-27 DIAGNOSIS — Z12.5 PROSTATE CANCER SCREENING: ICD-10-CM

## 2022-09-27 DIAGNOSIS — Z23 NEEDS FLU SHOT: ICD-10-CM

## 2022-09-27 DIAGNOSIS — I42.9 CARDIOMYOPATHY, UNSPECIFIED TYPE (HCC): ICD-10-CM

## 2022-09-27 DIAGNOSIS — K57.90 DIVERTICULOSIS: ICD-10-CM

## 2022-09-27 DIAGNOSIS — R00.1 BRADYCARDIA: ICD-10-CM

## 2022-09-27 DIAGNOSIS — E55.9 VITAMIN D DEFICIENCY: ICD-10-CM

## 2022-09-27 DIAGNOSIS — Z13.39 ALCOHOL SCREENING: ICD-10-CM

## 2022-09-27 DIAGNOSIS — F41.9 ANXIETY: ICD-10-CM

## 2022-09-27 DIAGNOSIS — E11.42 CONTROLLED TYPE 2 DIABETES MELLITUS WITH DIABETIC POLYNEUROPATHY, WITHOUT LONG-TERM CURRENT USE OF INSULIN (HCC): ICD-10-CM

## 2022-09-27 DIAGNOSIS — Z00.00 MEDICARE ANNUAL WELLNESS VISIT, SUBSEQUENT: ICD-10-CM

## 2022-09-27 DIAGNOSIS — E78.2 MIXED HYPERLIPIDEMIA: ICD-10-CM

## 2022-09-27 DIAGNOSIS — I10 ESSENTIAL HYPERTENSION: Primary | ICD-10-CM

## 2022-09-27 PROCEDURE — 90694 VACC AIIV4 NO PRSRV 0.5ML IM: CPT | Performed by: INTERNAL MEDICINE

## 2022-09-27 PROCEDURE — G0008 ADMIN INFLUENZA VIRUS VAC: HCPCS | Performed by: INTERNAL MEDICINE

## 2022-09-27 PROCEDURE — 1101F PT FALLS ASSESS-DOCD LE1/YR: CPT | Performed by: INTERNAL MEDICINE

## 2022-09-27 PROCEDURE — 93000 ELECTROCARDIOGRAM COMPLETE: CPT | Performed by: INTERNAL MEDICINE

## 2022-09-27 PROCEDURE — G8420 CALC BMI NORM PARAMETERS: HCPCS | Performed by: INTERNAL MEDICINE

## 2022-09-27 PROCEDURE — G0439 PPPS, SUBSEQ VISIT: HCPCS | Performed by: INTERNAL MEDICINE

## 2022-09-27 PROCEDURE — G8510 SCR DEP NEG, NO PLAN REQD: HCPCS | Performed by: INTERNAL MEDICINE

## 2022-09-27 PROCEDURE — 99214 OFFICE O/P EST MOD 30 MIN: CPT | Performed by: INTERNAL MEDICINE

## 2022-09-27 PROCEDURE — 3044F HG A1C LEVEL LT 7.0%: CPT | Performed by: INTERNAL MEDICINE

## 2022-09-27 PROCEDURE — G8752 SYS BP LESS 140: HCPCS | Performed by: INTERNAL MEDICINE

## 2022-09-27 PROCEDURE — 1123F ACP DISCUSS/DSCN MKR DOCD: CPT | Performed by: INTERNAL MEDICINE

## 2022-09-27 PROCEDURE — G8427 DOCREV CUR MEDS BY ELIG CLIN: HCPCS | Performed by: INTERNAL MEDICINE

## 2022-09-27 PROCEDURE — G0442 ANNUAL ALCOHOL SCREEN 15 MIN: HCPCS | Performed by: INTERNAL MEDICINE

## 2022-09-27 PROCEDURE — G8754 DIAS BP LESS 90: HCPCS | Performed by: INTERNAL MEDICINE

## 2022-09-27 PROCEDURE — G8536 NO DOC ELDER MAL SCRN: HCPCS | Performed by: INTERNAL MEDICINE

## 2022-09-27 NOTE — PATIENT INSTRUCTIONS
Medicare Wellness Visit, Male    The best way to live healthy is to have a lifestyle where you eat a well-balanced diet, exercise regularly, limit alcohol use, and quit all forms of tobacco/nicotine, if applicable. Regular preventive services are another way to keep healthy. Preventive services (vaccines, screening tests, monitoring & exams) can help personalize your care plan, which helps you manage your own care. Screening tests can find health problems at the earliest stages, when they are easiest to treat. [facility] follows the current, evidence-based guidelines published by the [facility] Sergio Mendez ([facility]) when recommending preventive services for our patients. Because we follow these guidelines, sometimes recommendations change over time as research supports it. (For example, a prostate screening blood test is no longer routinely recommended for men with no symptoms). Of course, you and your doctor may decide to screen more often for some diseases, based on your risk and co-morbidities (chronic disease you are already diagnosed with). Preventive services for you include:  - Medicare offers their members a free annual wellness visit, which is time for you and your primary care provider to discuss and plan for your preventive service needs. Take advantage of this benefit every year!  -All adults over age 72 should receive the recommended pneumonia vaccines. Current USPSTF guidelines recommend a series of two vaccines for the best pneumonia protection.   -All adults should have a flu vaccine yearly and tetanus vaccine every 10 years.  -All adults age 48 and older should receive the shingles vaccines (series of two vaccines).        -All adults age 38-68 who are overweight should have a diabetes screening test once every three years.   -Other screening tests & preventive services for persons with diabetes include: an eye exam to screen for diabetic retinopathy, a kidney function test, a foot exam, and stricter control over your cholesterol.   -Cardiovascular screening for adults with routine risk involves an electrocardiogram (ECG) at intervals determined by the provider.   -Colorectal cancer screening should be done for adults age 54-65 with no increased risk factors for colorectal cancer. There are a number of acceptable methods of screening for this type of cancer. Each test has its own benefits and drawbacks. Discuss with your provider what is most appropriate for you during your annual wellness visit. The different tests include: colonoscopy (considered the best screening method), a fecal occult blood test, a fecal DNA test, and sigmoidoscopy.  -All adults born between [facility] should be screened once for Hepatitis C.  -An Abdominal Aortic Aneurysm (AAA) Screening is recommended for men age 73-68 who has ever smoked in their lifetime.      Here is a list of your current Health Maintenance items (your personalized list of preventive services) with a due date:  Health Maintenance Due   Topic Date Due    DTaP/Tdap/Td  (1 - Tdap) Never done    Shingles Vaccine (1 of 2) Never done    COVID-19 Vaccine (4 - Booster for Mahoney Peter series) 04/08/2022    Yearly Flu Vaccine (1) 08/01/2022    Albumin Urine Test  09/15/2022    Annual Well Visit  09/16/2022

## 2022-09-27 NOTE — PROGRESS NOTES
HIPAA verified by two patient identifiers. Amna Mullins is a 79 y.o. male    Chief Complaint   Patient presents with    Annual Wellness Visit       Visit Vitals  /80 (BP 1 Location: Left upper arm, BP Patient Position: Sitting, BP Cuff Size: Small adult)   Pulse 72   Temp 98.3 °F (36.8 °C) (Oral)   Resp 16   Ht 4' 11\" (1.499 m)   Wt 113 lb 6.4 oz (51.4 kg)   SpO2 98%   BMI 22.90 kg/m²       Pain Scale: 0 - No pain/10  Pain Location:       Health Maintenance Due   Topic Date Due    DTaP/Tdap/Td series (1 - Tdap) Never done    Shingrix Vaccine Age 49> (1 of 2) Never done    COVID-19 Vaccine (4 - Booster for Pfizer series) 04/08/2022    Flu Vaccine (1) 08/01/2022    MICROALBUMIN Q1  09/15/2022    Medicare Yearly Exam  09/16/2022         Coordination of Care Questionnaire:  :   1) Have you been to an emergency room, urgent care, or hospitalized since your last visit? If yes, where when, and reason for visit? no       2. Have seen or consulted any other health care provider since your last visit? If yes, where when, and reason for visit? NO      Patient is accompanied by self I have received verbal consent from Amna Mullins to discuss any/all medical information while they are present in the room.

## 2022-09-27 NOTE — PROGRESS NOTES
After obtaining consent, and per verbal order from Dr. Khanh Carlos, patient received influenza vaccine given IM by Romeo Abraham LPN in left Deltoid. Vaccine was verified by Domingo Nicole. Patient was observed for 10 minutes post injection. Patient tolerated injection well.

## 2022-09-28 LAB
25(OH)D3 SERPL-MCNC: 35.2 NG/ML (ref 30–100)
ALBUMIN SERPL-MCNC: 3.9 G/DL (ref 3.5–5)
ALBUMIN/GLOB SERPL: 1.1 {RATIO} (ref 1.1–2.2)
ALP SERPL-CCNC: 79 U/L (ref 45–117)
ALT SERPL-CCNC: 29 U/L (ref 12–78)
ANION GAP SERPL CALC-SCNC: 7 MMOL/L (ref 5–15)
APPEARANCE UR: CLEAR
AST SERPL-CCNC: 25 U/L (ref 15–37)
BACTERIA URNS QL MICRO: NEGATIVE /HPF
BASOPHILS # BLD: 0 K/UL (ref 0–0.1)
BASOPHILS NFR BLD: 0 % (ref 0–1)
BILIRUB SERPL-MCNC: 1 MG/DL (ref 0.2–1)
BILIRUB UR QL: NEGATIVE
BUN SERPL-MCNC: 20 MG/DL (ref 6–20)
BUN/CREAT SERPL: 23 (ref 12–20)
CALCIUM SERPL-MCNC: 10.3 MG/DL (ref 8.5–10.1)
CAOX CRY URNS QL MICRO: ABNORMAL
CHLORIDE SERPL-SCNC: 106 MMOL/L (ref 97–108)
CHOLEST SERPL-MCNC: 173 MG/DL
CK SERPL-CCNC: 136 U/L (ref 39–308)
CO2 SERPL-SCNC: 28 MMOL/L (ref 21–32)
COLOR UR: ABNORMAL
CREAT SERPL-MCNC: 0.86 MG/DL (ref 0.7–1.3)
CREAT UR-MCNC: 120 MG/DL
DIFFERENTIAL METHOD BLD: ABNORMAL
EOSINOPHIL # BLD: 0.1 K/UL (ref 0–0.4)
EOSINOPHIL NFR BLD: 1 % (ref 0–7)
EPITH CASTS URNS QL MICRO: ABNORMAL /LPF
ERYTHROCYTE [DISTWIDTH] IN BLOOD BY AUTOMATED COUNT: 14.2 % (ref 11.5–14.5)
EST. AVERAGE GLUCOSE BLD GHB EST-MCNC: 123 MG/DL
GLOBULIN SER CALC-MCNC: 3.4 G/DL (ref 2–4)
GLUCOSE SERPL-MCNC: 88 MG/DL (ref 65–100)
GLUCOSE UR STRIP.AUTO-MCNC: NEGATIVE MG/DL
HBA1C MFR BLD: 5.9 % (ref 4–5.6)
HCT VFR BLD AUTO: 51.3 % (ref 36.6–50.3)
HDLC SERPL-MCNC: 61 MG/DL
HDLC SERPL: 2.8 {RATIO} (ref 0–5)
HGB BLD-MCNC: 16.7 G/DL (ref 12.1–17)
HGB UR QL STRIP: ABNORMAL
IMM GRANULOCYTES # BLD AUTO: 0 K/UL (ref 0–0.04)
IMM GRANULOCYTES NFR BLD AUTO: 0 % (ref 0–0.5)
KETONES UR QL STRIP.AUTO: ABNORMAL MG/DL
LDLC SERPL CALC-MCNC: 98.4 MG/DL (ref 0–100)
LEUKOCYTE ESTERASE UR QL STRIP.AUTO: ABNORMAL
LYMPHOCYTES # BLD: 2.5 K/UL (ref 0.8–3.5)
LYMPHOCYTES NFR BLD: 29 % (ref 12–49)
MCH RBC QN AUTO: 32.9 PG (ref 26–34)
MCHC RBC AUTO-ENTMCNC: 32.6 G/DL (ref 30–36.5)
MCV RBC AUTO: 101.2 FL (ref 80–99)
MICROALBUMIN UR-MCNC: 6.19 MG/DL
MICROALBUMIN/CREAT UR-RTO: 52 MG/G (ref 0–30)
MONOCYTES # BLD: 0.8 K/UL (ref 0–1)
MONOCYTES NFR BLD: 9 % (ref 5–13)
NEUTS SEG # BLD: 5.1 K/UL (ref 1.8–8)
NEUTS SEG NFR BLD: 61 % (ref 32–75)
NITRITE UR QL STRIP.AUTO: NEGATIVE
NRBC # BLD: 0 K/UL (ref 0–0.01)
NRBC BLD-RTO: 0 PER 100 WBC
PH UR STRIP: 6.5 [PH] (ref 5–8)
PLATELET # BLD AUTO: 219 K/UL (ref 150–400)
PMV BLD AUTO: 10.7 FL (ref 8.9–12.9)
POTASSIUM SERPL-SCNC: 5.3 MMOL/L (ref 3.5–5.1)
PROT SERPL-MCNC: 7.3 G/DL (ref 6.4–8.2)
PROT UR STRIP-MCNC: ABNORMAL MG/DL
PSA SERPL-MCNC: 1.2 NG/ML (ref 0.01–4)
RBC # BLD AUTO: 5.07 M/UL (ref 4.1–5.7)
RBC #/AREA URNS HPF: ABNORMAL /HPF (ref 0–5)
SODIUM SERPL-SCNC: 141 MMOL/L (ref 136–145)
SP GR UR REFRACTOMETRY: 1.02 (ref 1–1.03)
T4 SERPL-MCNC: 12.2 UG/DL (ref 4.5–12.1)
TRIGL SERPL-MCNC: 68 MG/DL (ref ?–150)
TSH SERPL DL<=0.05 MIU/L-ACNC: 1.43 UIU/ML (ref 0.36–3.74)
UA: UC IF INDICATED,UAUC: ABNORMAL
UROBILINOGEN UR QL STRIP.AUTO: 1 EU/DL (ref 0.2–1)
VLDLC SERPL CALC-MCNC: 13.6 MG/DL
WBC # BLD AUTO: 8.5 K/UL (ref 4.1–11.1)
WBC URNS QL MICRO: ABNORMAL /HPF (ref 0–4)

## 2022-10-03 ENCOUNTER — TELEPHONE (OUTPATIENT)
Dept: INTERNAL MEDICINE CLINIC | Age: 82
End: 2022-10-03

## 2022-10-03 ENCOUNTER — HOSPITAL ENCOUNTER (EMERGENCY)
Age: 82
Discharge: HOME | End: 2022-10-03
Attending: STUDENT IN AN ORGANIZED HEALTH CARE EDUCATION/TRAINING PROGRAM
Payer: MEDICARE

## 2022-10-03 ENCOUNTER — APPOINTMENT (OUTPATIENT)
Dept: GENERAL RADIOLOGY | Age: 82
End: 2022-10-03
Attending: STUDENT IN AN ORGANIZED HEALTH CARE EDUCATION/TRAINING PROGRAM
Payer: MEDICARE

## 2022-10-03 VITALS
WEIGHT: 112.43 LBS | OXYGEN SATURATION: 98 % | BODY MASS INDEX: 22.07 KG/M2 | SYSTOLIC BLOOD PRESSURE: 154 MMHG | HEIGHT: 60 IN | TEMPERATURE: 98.3 F | RESPIRATION RATE: 18 BRPM | DIASTOLIC BLOOD PRESSURE: 58 MMHG | HEART RATE: 65 BPM

## 2022-10-03 DIAGNOSIS — J18.9 PNEUMONIA OF RIGHT UPPER LOBE DUE TO INFECTIOUS ORGANISM: Primary | ICD-10-CM

## 2022-10-03 DIAGNOSIS — U07.1 COVID-19: ICD-10-CM

## 2022-10-03 LAB
ALBUMIN SERPL-MCNC: 3.2 G/DL (ref 3.5–5)
ALBUMIN/GLOB SERPL: 0.8 {RATIO} (ref 1.1–2.2)
ALP SERPL-CCNC: 69 U/L (ref 45–117)
ALT SERPL-CCNC: 30 U/L (ref 12–78)
ANION GAP SERPL CALC-SCNC: 7 MMOL/L (ref 5–15)
AST SERPL-CCNC: 52 U/L (ref 15–37)
BASOPHILS # BLD: 0 K/UL (ref 0–0.1)
BASOPHILS NFR BLD: 0 % (ref 0–1)
BILIRUB SERPL-MCNC: 0.7 MG/DL (ref 0.2–1)
BUN SERPL-MCNC: 17 MG/DL (ref 6–20)
BUN/CREAT SERPL: 24 (ref 12–20)
CALCIUM SERPL-MCNC: 9.4 MG/DL (ref 8.5–10.1)
CHLORIDE SERPL-SCNC: 101 MMOL/L (ref 97–108)
CO2 SERPL-SCNC: 27 MMOL/L (ref 21–32)
CREAT SERPL-MCNC: 0.7 MG/DL (ref 0.7–1.3)
DIFFERENTIAL METHOD BLD: ABNORMAL
EOSINOPHIL # BLD: 0 K/UL (ref 0–0.4)
EOSINOPHIL NFR BLD: 0 % (ref 0–7)
ERYTHROCYTE [DISTWIDTH] IN BLOOD BY AUTOMATED COUNT: 13.1 % (ref 11.5–14.5)
GLOBULIN SER CALC-MCNC: 3.8 G/DL (ref 2–4)
GLUCOSE SERPL-MCNC: 87 MG/DL (ref 65–100)
HCT VFR BLD AUTO: 46.2 % (ref 36.6–50.3)
HGB BLD-MCNC: 15.8 G/DL (ref 12.1–17)
IMM GRANULOCYTES # BLD AUTO: 0 K/UL (ref 0–0.04)
IMM GRANULOCYTES NFR BLD AUTO: 0 % (ref 0–0.5)
LYMPHOCYTES # BLD: 1.8 K/UL (ref 0.8–3.5)
LYMPHOCYTES NFR BLD: 17 % (ref 12–49)
MCH RBC QN AUTO: 33.4 PG (ref 26–34)
MCHC RBC AUTO-ENTMCNC: 34.2 G/DL (ref 30–36.5)
MCV RBC AUTO: 97.7 FL (ref 80–99)
MONOCYTES # BLD: 1.3 K/UL (ref 0–1)
MONOCYTES NFR BLD: 12 % (ref 5–13)
NEUTS SEG # BLD: 7.5 K/UL (ref 1.8–8)
NEUTS SEG NFR BLD: 71 % (ref 32–75)
NRBC # BLD: 0 K/UL (ref 0–0.01)
NRBC BLD-RTO: 0 PER 100 WBC
PLATELET # BLD AUTO: 181 K/UL (ref 150–400)
PMV BLD AUTO: 9.4 FL (ref 8.9–12.9)
POTASSIUM SERPL-SCNC: 4 MMOL/L (ref 3.5–5.1)
PROT SERPL-MCNC: 7 G/DL (ref 6.4–8.2)
RBC # BLD AUTO: 4.73 M/UL (ref 4.1–5.7)
SODIUM SERPL-SCNC: 135 MMOL/L (ref 136–145)
TROPONIN-HIGH SENSITIVITY: 53 NG/L (ref 0–76)
TROPONIN-HIGH SENSITIVITY: 54 NG/L (ref 0–76)
WBC # BLD AUTO: 10.6 K/UL (ref 4.1–11.1)

## 2022-10-03 PROCEDURE — 74011250637 HC RX REV CODE- 250/637: Performed by: STUDENT IN AN ORGANIZED HEALTH CARE EDUCATION/TRAINING PROGRAM

## 2022-10-03 PROCEDURE — 93005 ELECTROCARDIOGRAM TRACING: CPT

## 2022-10-03 PROCEDURE — 71046 X-RAY EXAM CHEST 2 VIEWS: CPT

## 2022-10-03 PROCEDURE — 99285 EMERGENCY DEPT VISIT HI MDM: CPT

## 2022-10-03 PROCEDURE — 85025 COMPLETE CBC W/AUTO DIFF WBC: CPT

## 2022-10-03 PROCEDURE — 80053 COMPREHEN METABOLIC PANEL: CPT

## 2022-10-03 PROCEDURE — 36415 COLL VENOUS BLD VENIPUNCTURE: CPT

## 2022-10-03 PROCEDURE — 84484 ASSAY OF TROPONIN QUANT: CPT

## 2022-10-03 RX ORDER — FAMOTIDINE 20 MG/1
20 TABLET, FILM COATED ORAL
Status: COMPLETED | OUTPATIENT
Start: 2022-10-03 | End: 2022-10-03

## 2022-10-03 RX ORDER — AMOXICILLIN AND CLAVULANATE POTASSIUM 875; 125 MG/1; MG/1
1 TABLET, FILM COATED ORAL 2 TIMES DAILY
Qty: 10 TABLET | Refills: 0 | Status: SHIPPED | OUTPATIENT
Start: 2022-10-03 | End: 2022-10-08

## 2022-10-03 RX ORDER — AZITHROMYCIN 250 MG/1
TABLET, FILM COATED ORAL
Qty: 6 TABLET | Refills: 0 | Status: SHIPPED | OUTPATIENT
Start: 2022-10-03 | End: 2022-10-08

## 2022-10-03 RX ADMIN — FAMOTIDINE 20 MG: 20 TABLET, FILM COATED ORAL at 17:26

## 2022-10-03 NOTE — TELEPHONE ENCOUNTER
Patient called to report he [facility]  Was given a number he said to call and reports they told him he was out of the window for treatment. Now experiencing SOB. Advised patient to go to [facility] today for further evaluation.

## 2022-10-03 NOTE — ED PROVIDER NOTES
EMERGENCY DEPARTMENT HISTORY AND PHYSICAL EXAM      Date: 10/3/2022  Patient Name: Hedy Hsieh    History of Presenting Illness     Chief Complaint   Patient presents with    Cough    Positive For Covid-19     Breathing is difficult with cough. Sore throat. Pt has had Covid a week; symptoms with breathing started yesterday. Primary doctor sent him to eD       History Provided By: Patient    HPI: Hedy Hsieh, 79 y.o. male presents to the ED with cc of cough, sore throat. Diagnosed with COVID on Saturday, symptoms started Wednesday. [facility], was referred for primary care due to concerns for developing pneumonia as patient has multiple cardiac issues and comorbidities. Reports he is feeling better than he did last week. His only lingering symptom is sore throat and cough. He additionally states he has been experiencing hiccups today. He denies any associated chest pain, abdominal pain, nausea, vomiting, diarrhea. There are no other complaints, changes, or physical findings at this time. PCP: Phoebe Rodriguez MD    No current facility-administered medications on file prior to encounter. Current Outpatient Medications on File Prior to Encounter   Medication Sig Dispense Refill    atorvastatin (LIPITOR) 20 mg tablet TAKE 1 TABLET BY MOUTH EVERY DAY 90 Tablet 3    amLODIPine (NORVASC) 5 mg tablet Take 1 Tablet by mouth daily. 30 Tablet prn    ramipriL (ALTACE) 10 mg capsule TAKE 2 TABLETS BY MOUTH EVERY  Capsule 3    metoprolol succinate (TOPROL-XL) 25 mg XL tablet TAKE 1 TABLET BY MOUTH EVERY DAY 90 Tablet 3    metFORMIN (GLUCOPHAGE) 500 mg tablet TAKE 1 TABLET BY MOUTH TWICE A DAY WITH MEALS 180 Tablet 3    polyethylene glycol (Purelax) 17 gram/dose powder MIX 17G IN WATER AND DRINK DAILY 850 g 5    fluoride, sodium, 1.1 % pste BRUSH WITH A PEA SIZED AMOUNT FOR 2 MINUTES BEFORE BEDTIME. DO NOT RINSE/DRINK/EAT AFTER BRUSHING.       Blood-Glucose Meter (True Metrix Glucose Meter) misc Use daily as needed for diabetes management. Diagnosis E11.9 1 Each 0    glucose blood VI test strips (True Metrix Glucose Test Strip) strip Use 1 to 2 times daily as needed for Diabetes management. Diagnosis is 411.9. 50 Strip 5    omeprazole (PRILOSEC) 20 mg capsule TAKE ONE CAPSULE BY MOUTH ONCE DAILY (Patient taking differently: as needed. TAKE ONE CAPSULE BY MOUTH ONCE DAILY) 90 Cap 3    OTHER       glucose blood VI test strips (TRUETEST TEST STRIPS) strip by Does Not Apply route See Admin Instructions. 100 Strip prn    UBIDECARENONE (CO Q-10 PO) Take  by mouth. OTHER Take 7 Tabs by mouth daily. [facility] FROM [facility]      aspirin 81 mg tablet Take 81 mg by mouth daily.  Indications: taking one a day         Past History     Past Medical History:  Past Medical History:   Diagnosis Date    Anxiety 8/5/2017    Arrhythmia     Arthritis     Atherosclerotic heart disease of native coronary artery without angina pectoris 8/5/2017    Bradycardia 8/5/2017    CAD (coronary artery disease)     Constipation, chronic 8/5/2017    Diabetes ([address]     diet controlled    Diabetes mellitus ([address] 8/5/2017    Diverticulosis 8/5/2017    ED (erectile dysfunction) 8/5/2017    GERD (gastroesophageal reflux disease)     Hypertension     Hypogonadism male 8/5/2017    Kidney stone     Neuropathy 8/5/2017    On statin therapy 8/5/2017    Right foot pain 8/5/2017       Past Surgical History:  Past Surgical History:   Procedure Laterality Date    COLONOSCOPY N/A 6/21/2016    COLONOSCOPY performed by Beverly Hung., MD at [address]; HI RISK IND  6/21/2016         HX GI      COLONOSCOPY    HX HEART CATHETERIZATION      HX OTHER SURGICAL      artificial testicle    HX PACEMAKER  10/6/15    OK CARDIAC SURG PROCEDURE UNLIST      cabg x4 vessels 1999    OK COLONOSCOPY FLX DX W/COLLJ SPEC WHEN PFRMD  4/8/2011         UPPER GI ENDOSCOPY,BIOPSY  12/14/2016 Family History:  Family History   Problem Relation Age of Onset    Heart Disease Mother     No Known Problems Father     Cancer Sister     Anesth Problems Neg Hx        Social History:  Social History     Tobacco Use    Smoking status: Never    Smokeless tobacco: Never   Vaping Use    Vaping Use: Never used   Substance Use Topics    Alcohol use: No    Drug use: No       Allergies: Allergies   Allergen Reactions    Caffeine Unknown (comments)     Sweating AND WOOZY AND CAN'T SLEEP    Codeine Other (comments)     Lightheaded, WOOZY AND CAN'T SLEEP    Percocet [Oxycodone-Acetaminophen] Nausea and Vomiting         Review of Systems   Review of Systems   Constitutional:  Negative for chills and fever. HENT:  Negative for sore throat. Eyes:  Negative for visual disturbance. Respiratory:  Positive for cough. Negative for shortness of breath. Cardiovascular:  Negative for chest pain and leg swelling. Gastrointestinal:  Negative for abdominal pain, diarrhea, nausea and vomiting. Genitourinary:  Negative for difficulty urinating and hematuria. Musculoskeletal:  Negative for back pain. Skin:  Negative for rash. Neurological:  Negative for dizziness, syncope and light-headedness. Physical Exam   Physical Exam  Vitals and nursing note reviewed. Constitutional:       Appearance: Normal appearance. HENT:      Head: Normocephalic and atraumatic. Eyes:      Conjunctiva/sclera: Conjunctivae normal.   Cardiovascular:      Rate and Rhythm: Normal rate and regular rhythm. Pulses: Normal pulses. Pulmonary:      Effort: Pulmonary effort is normal.      Breath sounds: Normal breath sounds. Abdominal:      General: Abdomen is flat. Palpations: Abdomen is soft. Musculoskeletal:      Cervical back: Neck supple. Skin:     General: Skin is warm. Neurological:      General: No focal deficit present. Mental Status: He is alert.    Psychiatric:         Mood and Affect: Mood normal. Behavior: Behavior normal.       Diagnostic Study Results     Labs -     Recent Results (from the past 12 hour(s))   EKG, 12 LEAD, INITIAL    Collection Time: 10/03/22  4:07 PM   Result Value Ref Range    Ventricular Rate 66 BPM    Atrial Rate 66 BPM    P-R Interval 164 ms    QRS Duration 146 ms    Q-T Interval 446 ms    QTC Calculation (Bezet) 467 ms    Calculated P Axis 98 degrees    Calculated R Axis -103 degrees    Calculated T Axis 70 degrees    Diagnosis       Atrial-sensed ventricular-paced rhythm  Biventricular pacemaker detected  When compared with ECG of 11-MAR-2018 21:31,  Vent. rate has decreased BY  13 BPM     CBC WITH AUTOMATED DIFF    Collection Time: 10/03/22  5:33 PM   Result Value Ref Range    WBC 10.6 4.1 - 11.1 K/uL    RBC 4.73 4.10 - 5.70 M/uL    HGB 15.8 12.1 - 17.0 g/dL    HCT 46.2 36.6 - 50.3 %    MCV 97.7 80.0 - 99.0 FL    MCH 33.4 26.0 - 34.0 PG    MCHC 34.2 30.0 - 36.5 g/dL    RDW 13.1 11.5 - 14.5 %    PLATELET 934 512 - 033 K/uL    MPV 9.4 8.9 - 12.9 FL    NRBC 0.0 0  WBC    ABSOLUTE NRBC 0.00 0.00 - 0.01 K/uL    NEUTROPHILS 71 32 - 75 %    LYMPHOCYTES 17 12 - 49 %    MONOCYTES 12 5 - 13 %    EOSINOPHILS 0 0 - 7 %    BASOPHILS 0 0 - 1 %    IMMATURE GRANULOCYTES 0 0.0 - 0.5 %    ABS. NEUTROPHILS 7.5 1.8 - 8.0 K/UL    ABS. LYMPHOCYTES 1.8 0.8 - 3.5 K/UL    ABS. MONOCYTES 1.3 (H) 0.0 - 1.0 K/UL    ABS. EOSINOPHILS 0.0 0.0 - 0.4 K/UL    ABS. BASOPHILS 0.0 0.0 - 0.1 K/UL    ABS. IMM.  GRANS. 0.0 0.00 - 0.04 K/UL    DF AUTOMATED     METABOLIC PANEL, COMPREHENSIVE    Collection Time: 10/03/22  5:33 PM   Result Value Ref Range    Sodium 135 (L) 136 - 145 mmol/L    Potassium 4.0 3.5 - 5.1 mmol/L    Chloride 101 97 - 108 mmol/L    CO2 27 21 - 32 mmol/L    Anion gap 7 5 - 15 mmol/L    Glucose 87 65 - 100 mg/dL    BUN 17 6 - 20 MG/DL    Creatinine 0.70 0.70 - 1.30 MG/DL    BUN/Creatinine ratio 24 (H) 12 - 20      eGFR >60 >60 ml/min/1.73m2    Calcium 9.4 8.5 - 10.1 MG/DL    Bilirubin, total 0.7 0.2 - 1.0 MG/DL    ALT (SGPT) 30 12 - 78 U/L    AST (SGOT) 52 (H) 15 - 37 U/L    Alk. phosphatase 69 45 - 117 U/L    Protein, total 7.0 6.4 - 8.2 g/dL    Albumin 3.2 (L) 3.5 - 5.0 g/dL    Globulin 3.8 2.0 - 4.0 g/dL    A-G Ratio 0.8 (L) 1.1 - 2.2     TROPONIN-HIGH SENSITIVITY    Collection Time: 10/03/22  5:33 PM   Result Value Ref Range    Troponin-High Sensitivity 53 0 - 76 ng/L   TROPONIN-HIGH SENSITIVITY    Collection Time: 10/03/22  6:52 PM   Result Value Ref Range    Troponin-High Sensitivity 54 0 - 76 ng/L       Radiologic Studies -   XR CHEST PA LAT   Final Result   Focal right upper lobe opacity suspicious for pneumonia in   appropriate clinical setting. CT Results  (Last 48 hours)      None          CXR Results  (Last 48 hours)                 10/03/22 1639  XR CHEST PA LAT Final result    Impression:  Focal right upper lobe opacity suspicious for pneumonia in   appropriate clinical setting. Narrative:  EXAM: XR CHEST PA LAT       INDICATION: Covid positive more short of breath started yesterday       COMPARISON: Chest x-ray 3/10/2016. Trula Blew FINDINGS: PA and lateral radiographs of the chest demonstrate focal opacity in   the lateral subpleural right upper lobe above the minor fissure but otherwise   are clear. There are median sternotomy wires and surgical clips compatible with   prior CABG. Pacemaker generator body projects over the left chest wall with   intact appearing leads traversing in expected course. The cardiac and   mediastinal contours and pulmonary vascularity are normal. The chest wall   structures and visualized upper abdomen show no acute findings with incidental   note of degenerative spine and shoulder changes as well as diffuse osteopenia. Medical Decision Making   I am the first provider for this patient. I reviewed the vital signs, available nursing notes, past medical history, past surgical history, family history and social history.     Vital Signs-Reviewed the patient's vital signs. Patient Vitals for the past 12 hrs:   Temp Pulse Resp BP SpO2   10/03/22 1557 98.3 °F (36.8 °C) 65 18 (!) 154/58 98 %       EKG interpretation: (Preliminary)  AV paced, rate: 66, QRS: Prolonged, no significant ST elevations or depressions, unchanged from baseline     Records Reviewed: Nursing Notes, Old Medical Records, Previous electrocardiograms, Previous Radiology Studies, and Previous Laboratory Studies    Provider Notes (Medical Decision Making):   Patient presenting with chief complaint of cough, diagnosed with COVID on last Saturday, symptoms started on Wednesday. Referred from primary care for chest x-ray given his past medical history. [facility], had lab work done at that time that was negative for acute process. Denies any fever, will obtain chest x-ray labs and reassess. ED Course:   Initial assessment performed. The patients presenting problems have been discussed, and they are in agreement with the care plan formulated and outlined with them. I have encouraged them to ask questions as they arise throughout their visit. ED Course as of 10/03/22 2023   Mon Oct 03, 2022   2003 O2 sats 98% when ambulating, troponin 53>54. Paitent deyning any chest pain, dyspnea. Feels improved. Will dc home with rx for abx. [NM]      ED Course User Index  [NM] Gaurav Scale, DO         Disposition:  DC- Adult Discharges: All of the diagnostic tests were reviewed and questions answered. Diagnosis, care plan and treatment options were discussed. The patient understands the instructions and will follow up as directed. The patients results have been reviewed with them. They have been counseled regarding their diagnosis. The patient verbally convey understanding and agreement of the signs, symptoms, diagnosis, treatment and prognosis and additionally agrees to follow up as recommended with their PCP in 24 - 48 hours.   They also agree with the care-plan and convey that all of their questions have been answered. I have also put together some discharge instructions for them that include: 1) educational information regarding their diagnosis, 2) how to care for their diagnosis at home, as well a 3) list of reasons why they would want to return to the ED prior to their follow-up appointment, should their condition change. DC-The patient was given verbal follow-up instructions    DISCHARGE PLAN:  1. Discharge Medication List as of 10/3/2022  8:15 PM        2. Follow-up Information       Follow up With Specialties Details Why Contact Info    Landen Gallagher MD Internal Medicine Physician Call in 1 day  [address] [address] [phone]      [facility] EM[facility]ENCY DEPT Emergency Medicine  If symptoms worsen [address]  [address] [facility]  [phone]          3. Return to ED if worse     Diagnosis     Clinical Impression:   1. Pneumonia of right upper lobe due to infectious organism    2. COVID-19        Attestations:    Radha Saucedo, DO        Please note that this dictation was completed with [employer], the computer voice recognition software. Quite often unanticipated grammatical, syntax, homophones, and other interpretive errors are inadvertently transcribed by the computer software. Please disregard these errors. Please excuse any errors that have escaped final proofreading. Thank you.

## 2022-10-04 LAB
ATRIAL RATE: 66 BPM
CALCULATED P AXIS, ECG09: 98 DEGREES
CALCULATED R AXIS, ECG10: -103 DEGREES
CALCULATED T AXIS, ECG11: 70 DEGREES
DIAGNOSIS, 93000: NORMAL
P-R INTERVAL, ECG05: 164 MS
Q-T INTERVAL, ECG07: 446 MS
QRS DURATION, ECG06: 146 MS
QTC CALCULATION (BEZET), ECG08: 467 MS
VENTRICULAR RATE, ECG03: 66 BPM

## 2022-10-05 RX ORDER — CHLORPROMAZINE HYDROCHLORIDE 25 MG/1
25 TABLET, FILM COATED ORAL 3 TIMES DAILY
Qty: 21 TABLET | Refills: 0 | Status: SHIPPED | OUTPATIENT
Start: 2022-10-05

## 2022-10-05 NOTE — TELEPHONE ENCOUNTER
RX refill request from the patient/pharmacy. Patient last seen 09- with labs, and next appt. scheduled for 11-  Requested Prescriptions     Pending Prescriptions Disp Refills    chlorproMAZINE (THORAZINE) 25 mg tablet 21 Tablet 0     Sig: Take 1 Tablet by mouth three (3) times daily. Ramone Snowden

## 2022-10-19 ENCOUNTER — TELEPHONE (OUTPATIENT)
Dept: INTERNAL MEDICINE CLINIC | Age: 82
End: 2022-10-19

## 2022-10-19 RX ORDER — CHLORPROMAZINE HYDROCHLORIDE 25 MG/1
25 TABLET, FILM COATED ORAL 3 TIMES DAILY
Qty: 21 TABLET | Refills: 0 | Status: SHIPPED | OUTPATIENT
Start: 2022-10-19

## 2022-10-19 NOTE — TELEPHONE ENCOUNTER
RX refill request from the patient/pharmacy. Patient last seen 09- with labs, and next appt. scheduled for 11-  Requested Prescriptions     Pending Prescriptions Disp Refills    chlorproMAZINE (THORAZINE) 25 mg tablet 21 Tablet 0     Sig: Take 1 Tablet by mouth three (3) times daily. Jimi Palmer

## 2022-10-19 NOTE — TELEPHONE ENCOUNTER
Patient states his hic cups are returning. Discussed with Dr. Tammy Sunshine and he okayed a refill of thorazine. Verbal okay given.

## 2022-10-26 PROBLEM — Z00.00 MEDICARE ANNUAL WELLNESS VISIT, SUBSEQUENT: Status: RESOLVED | Noted: 2017-08-07 | Resolved: 2022-10-26

## 2022-10-26 PROBLEM — Z12.5 PROSTATE CANCER SCREENING: Status: RESOLVED | Noted: 2021-09-14 | Resolved: 2022-10-26

## 2022-11-02 ENCOUNTER — LAB ONLY (OUTPATIENT)
Dept: INTERNAL MEDICINE CLINIC | Age: 82
End: 2022-11-02

## 2022-11-02 DIAGNOSIS — R79.89 ELEVATED SERUM FREE T4 LEVEL: Primary | ICD-10-CM

## 2022-11-02 RX ORDER — METFORMIN HYDROCHLORIDE 500 MG/1
TABLET ORAL
Qty: 180 TABLET | Refills: 3 | Status: SHIPPED | OUTPATIENT
Start: 2022-11-02

## 2022-11-02 NOTE — TELEPHONE ENCOUNTER
PCP: Merlin Carpenter MD    Last appt: Visit date not found  Future Appointments   Date Time Provider Walker Ding   1/10/2023  9:30 AM Merlin Carpenter MD [facility]       Last refilled:11/1/21    Requested Prescriptions     Pending Prescriptions Disp Refills    metFORMIN (GLUCOPHAGE) 500 mg tablet [Pharmacy Med Name: METFORMIN  MG TABLET] 180 Tablet 3     Sig: TAKE 1 TABLET BY MOUTH TWICE A DAY WITH MEALS

## 2022-11-03 LAB
T4 SERPL-MCNC: 10.5 UG/DL (ref 4.5–12.1)
TSH SERPL DL<=0.05 MIU/L-ACNC: 1.41 UIU/ML (ref 0.36–3.74)

## 2022-11-04 NOTE — PROGRESS NOTES
Called and spoke to patient  Two pt identifiers confirmed  Informed patient per Dr. Reyes Re that thyroid is now normal and to continue current medications  Patient verbalized understanding of information discussed  with no further questions at this time.

## 2023-01-09 NOTE — PROGRESS NOTES
Chief Complaint   Patient presents with    Hypertension     3 month follow up    Diabetes    Cholesterol Problem    Blood in Urine     X 1 week       SUBJECTIVE:    Daylin Rivera is a 79 y.o. male who returns in follow-up of his medical problems include hypertension, hyperlipidemia, diabetes, cardiomyopathy, ASCVD, DJD and other medical problems. He currently denies any chest pain, shortness of breath, palpitations, PND, orthopnea or other cardiac or respiratory complaints. He denies any GI complaints but he does note he has had some blood in his urine over the past week almost every time he passes urine. He notes no back pain or abdominal pain and no urinary frequency or dysuria. He has previously seen Dr. Urszula Upton from urology but he has now moved from the [facility] office. He currently denies any headaches, dizziness or neurologic complaints. There are no arthritic complaints and there are no other complaints on complete view of systems. Current Outpatient Medications   Medication Sig Dispense Refill    metFORMIN (GLUCOPHAGE) 500 mg tablet TAKE 1 TABLET BY MOUTH TWICE A DAY WITH MEALS (Patient taking differently: Indications: patient is taking once daily) 180 Tablet 3    chlorproMAZINE (THORAZINE) 25 mg tablet Take 1 Tablet by mouth three (3) times daily. 21 Tablet 0    atorvastatin (LIPITOR) 20 mg tablet TAKE 1 TABLET BY MOUTH EVERY DAY 90 Tablet 3    amLODIPine (NORVASC) 5 mg tablet Take 1 Tablet by mouth daily. 30 Tablet prn    ramipriL (ALTACE) 10 mg capsule TAKE 2 TABLETS BY MOUTH EVERY  Capsule 3    metoprolol succinate (TOPROL-XL) 25 mg XL tablet TAKE 1 TABLET BY MOUTH EVERY DAY 90 Tablet 3    polyethylene glycol (Purelax) 17 gram/dose powder MIX 17G IN WATER AND DRINK DAILY 850 g 5    fluoride, sodium, 1.1 % pste BRUSH WITH A PEA SIZED AMOUNT FOR 2 MINUTES BEFORE BEDTIME. DO NOT RINSE/DRINK/EAT AFTER BRUSHING.       Blood-Glucose Meter (True Metrix Glucose Meter) misc Use daily as needed for diabetes management. Diagnosis E11.9 1 Each 0    glucose blood VI test strips (True Metrix Glucose Test Strip) strip Use 1 to 2 times daily as needed for Diabetes management. Diagnosis is 411.9. 50 Strip 5    omeprazole (PRILOSEC) 20 mg capsule TAKE ONE CAPSULE BY MOUTH ONCE DAILY (Patient taking differently: as needed. TAKE ONE CAPSULE BY MOUTH ONCE DAILY) 90 Cap 3    OTHER       glucose blood VI test strips (TRUETEST TEST STRIPS) strip by Does Not Apply route See Admin Instructions. 100 Strip prn    UBIDECARENONE (CO Q-10 PO) Take  by mouth. OTHER Take 7 Tabs by mouth daily. [facility] FROM [facility]      aspirin 81 mg tablet Take 81 mg by mouth daily.  Indications: taking one a day       Past Medical History:   Diagnosis Date    Anxiety 8/5/2017    Arrhythmia     Arthritis     Atherosclerotic heart disease of native coronary artery without angina pectoris 8/5/2017    Bradycardia 8/5/2017    CAD (coronary artery disease)     Constipation, chronic 8/5/2017    Diabetes ([address]     diet controlled    Diabetes mellitus ([address] 8/5/2017    Diverticulosis 8/5/2017    ED (erectile dysfunction) 8/5/2017    GERD (gastroesophageal reflux disease)     Hypertension     Hypogonadism male 8/5/2017    Kidney stone     Neuropathy 8/5/2017    On statin therapy 8/5/2017    Right foot pain 8/5/2017     Past Surgical History:   Procedure Laterality Date    COLONOSCOPY N/A 6/21/2016    COLONOSCOPY performed by Alyse Mack MD at [address]; HI RISK IND  6/21/2016         HX GI      COLONOSCOPY    HX HEART CATHETERIZATION      HX OTHER SURGICAL      artificial testicle    HX PACEMAKER  10/6/15    TX COLONOSCOPY FLX DX W/COLLJ SPEC WHEN PFRMD  4/8/2011         TX UNLISTED PROCEDURE CARDIAC SURGERY      cabg x4 vessels 1999    UPPER GI ENDOSCOPY,BIOPSY  12/14/2016          Allergies   Allergen Reactions    Caffeine Unknown (comments)     Sweating AND WOOZY AND CAN'T SLEEP    Codeine Other (comments)     Lightheaded, WOOZY AND CAN'T SLEEP    Percocet [Oxycodone-Acetaminophen] Nausea and Vomiting       REVIEW OF SYSTEMS:  General: negative for - chills or fever, or weight loss or gain  ENT: negative for - headaches, nasal congestion or tinnitus  Eyes: no blurred or visual changes  Neck: No stiffness or swollen nodes  Respiratory: negative for - cough, hemoptysis, shortness of breath or wheezing  Cardiovascular : negative for - chest pain, edema, palpitations or shortness of breath  Gastrointestinal: negative for - abdominal pain, blood in stools, heartburn or nausea/vomiting  Genito-Urinary: no dysuria, trouble voiding, or hematuria  Musculoskeletal: negative for - gait disturbance, joint pain, joint stiffness or joint swelling  Neurological: no TIA or stroke symptoms  Hematologic: no bruises, no bleeding  Lymphatic: no swollen glands  Integument: no lumps, mole changes, nail changes or rash  Endocrine:no malaise/lethargy poly uria or polydipsia or unexpected weight changes        Social History     Socioeconomic History    Marital status:    Tobacco Use    Smoking status: Never    Smokeless tobacco: Never   Vaping Use    Vaping Use: Never used   Substance and Sexual Activity    Alcohol use: No    Drug use: No     Family History   Problem Relation Age of Onset    Heart Disease Mother     No Known Problems Father     Cancer Sister     Anesth Problems Neg Hx        OBJECTIVE:     Visit Vitals  /72   Pulse 60   Temp 97.7 °F (36.5 °C) (Oral)   Resp 16   Ht 4' 11\" (1.499 m)   Wt 112 lb 11.2 oz (51.1 kg)   SpO2 97%   BMI 22.76 kg/m²     CONSTITUTIONAL:   well nourished, appears age appropriate  EYES: sclera anicteric, PERRL, EOMI  ENMT:nares clear, moist mucous membranes, pharynx clear  NECK: supple.  Thyroid normal, No JVD or bruits  RESPIRATORY: Chest: clear to ascultation and percussion, normal inspiratory effort  CARDIOVASCULAR: Heart: regular rate and rhythm no murmurs, rubs or gallops, PMI not displaced, No thrills, no peripheral edema  GASTROINTESTINAL: Abdomen: non distended, soft, non tender, bowel sounds normal  HEMATOLOGIC: no purpura, petechiae or bruising  LYMPHATIC: No lymph node enlargemant  MUSCULOSKELETAL: Extremities: no active synovitis, pulse 1+. No diabetic foot changes noted. INTEGUMENT: No unusual rashes or suspicious skin lesions noted. Nails appear normal.  PERIPHERAL VASCULAR: normal pulses femoral, PT and DP  NEUROLOGIC: non-focal exam, A & O X 3. Normal distal sensation and proprioception all toes both feet. PSYCHIATRIC:, appropriate affect     ASSESSMENT:   1. Essential hypertension    2. Controlled type 2 diabetes mellitus with diabetic polyneuropathy, without long-term current use of insulin ([address]    3. Mixed hyperlipidemia    4. Primary osteoarthritis involving multiple joints    5. ASCVD (arteriosclerotic cardiovascular disease)    6. Cardiomyopathy, unspecified type ([address]    7. Gross hematuria      Impression  1. Hypertension that is controlled so continue current therapy  2. Diabetes repeat status pending prior lab reviewed  3. Hyperlipidemia prior lab reviewed repeat status pending  4. DJD stable  5 ASCVD clinically stable continue aspirin daily  6. Cardiomyopathy stable  7. Gross hematuria I have given him the name of Dr. Amaris Cortez' and he will make an appointment to see him  I will check a urine and a urine culture  Follow-up me 3 months or sooner if there is a problem. I will call with today's lab results. PLAN:  .  Orders Placed This Encounter    CULTURE, URINE    METABOLIC PANEL, COMPREHENSIVE    LIPID PANEL    CK    HEMOGLOBIN A1C WITH EAG    URINALYSIS W/MICROSCOPIC         ATTENTION:   This medical record was transcribed using an electronic medical records system. Although proofread, it may and can contain electronic and spelling errors. Other human spelling and other errors may be present. Corrections may be executed at a later time.   Please feel free to contact us for any clarifications as needed. No results found for any visits on 01/10/23. David Kyle MD    The patient verbalized understanding of the problems and plans as explained.

## 2023-01-10 ENCOUNTER — OFFICE VISIT (OUTPATIENT)
Dept: INTERNAL MEDICINE CLINIC | Age: 83
End: 2023-01-10
Payer: MEDICARE

## 2023-01-10 VITALS
BODY MASS INDEX: 22.13 KG/M2 | SYSTOLIC BLOOD PRESSURE: 120 MMHG | HEART RATE: 60 BPM | TEMPERATURE: 97.7 F | DIASTOLIC BLOOD PRESSURE: 72 MMHG | HEIGHT: 60 IN | RESPIRATION RATE: 16 BRPM | WEIGHT: 112.7 LBS | OXYGEN SATURATION: 97 %

## 2023-01-10 DIAGNOSIS — M15.9 PRIMARY OSTEOARTHRITIS INVOLVING MULTIPLE JOINTS: ICD-10-CM

## 2023-01-10 DIAGNOSIS — I10 ESSENTIAL HYPERTENSION: Primary | ICD-10-CM

## 2023-01-10 DIAGNOSIS — E11.42 CONTROLLED TYPE 2 DIABETES MELLITUS WITH DIABETIC POLYNEUROPATHY, WITHOUT LONG-TERM CURRENT USE OF INSULIN (HCC): ICD-10-CM

## 2023-01-10 DIAGNOSIS — I42.9 CARDIOMYOPATHY, UNSPECIFIED TYPE (HCC): ICD-10-CM

## 2023-01-10 DIAGNOSIS — E78.2 MIXED HYPERLIPIDEMIA: ICD-10-CM

## 2023-01-10 DIAGNOSIS — I25.10 ASCVD (ARTERIOSCLEROTIC CARDIOVASCULAR DISEASE): ICD-10-CM

## 2023-01-10 DIAGNOSIS — R31.0 GROSS HEMATURIA: ICD-10-CM

## 2023-01-10 PROCEDURE — G8536 NO DOC ELDER MAL SCRN: HCPCS | Performed by: INTERNAL MEDICINE

## 2023-01-10 PROCEDURE — 3074F SYST BP LT 130 MM HG: CPT | Performed by: INTERNAL MEDICINE

## 2023-01-10 PROCEDURE — G8427 DOCREV CUR MEDS BY ELIG CLIN: HCPCS | Performed by: INTERNAL MEDICINE

## 2023-01-10 PROCEDURE — G8420 CALC BMI NORM PARAMETERS: HCPCS | Performed by: INTERNAL MEDICINE

## 2023-01-10 PROCEDURE — 1123F ACP DISCUSS/DSCN MKR DOCD: CPT | Performed by: INTERNAL MEDICINE

## 2023-01-10 PROCEDURE — 99214 OFFICE O/P EST MOD 30 MIN: CPT | Performed by: INTERNAL MEDICINE

## 2023-01-10 PROCEDURE — G8510 SCR DEP NEG, NO PLAN REQD: HCPCS | Performed by: INTERNAL MEDICINE

## 2023-01-10 PROCEDURE — 3078F DIAST BP <80 MM HG: CPT | Performed by: INTERNAL MEDICINE

## 2023-01-10 PROCEDURE — 1101F PT FALLS ASSESS-DOCD LE1/YR: CPT | Performed by: INTERNAL MEDICINE

## 2023-01-10 NOTE — PROGRESS NOTES
Chief Complaint   Patient presents with    Hypertension     3 month follow up    Diabetes    Cholesterol Problem    Blood in Urine     X 1 week     Visit Vitals  /82 (BP 1 Location: Left upper arm, BP Patient Position: Sitting, BP Cuff Size: Adult)   Pulse 60   Temp 97.7 °F (36.5 °C) (Oral)   Resp 16   Ht 4' 11\" (1.499 m)   Wt 112 lb 11.2 oz (51.1 kg)   SpO2 97%   BMI 22.76 kg/m²     1. Have you been to the ER, urgent care clinic since your last visit? Hospitalized since your last visit? No    2. Have you seen or consulted any other health care providers outside of the [address] [place] [address] since your last visit? Include any pap smears or colon screening.  Dr. Celestina Severin in [place]

## 2023-01-11 LAB
ALBUMIN SERPL-MCNC: 3.8 G/DL (ref 3.5–5)
ALBUMIN/GLOB SERPL: 1 (ref 1.1–2.2)
ALP SERPL-CCNC: 85 U/L (ref 45–117)
ALT SERPL-CCNC: 24 U/L (ref 12–78)
ANION GAP SERPL CALC-SCNC: 4 MMOL/L (ref 5–15)
APPEARANCE UR: ABNORMAL
AST SERPL-CCNC: 24 U/L (ref 15–37)
BACTERIA URNS QL MICRO: ABNORMAL /HPF
BILIRUB SERPL-MCNC: 0.9 MG/DL (ref 0.2–1)
BILIRUB UR QL: NEGATIVE
BUN SERPL-MCNC: 17 MG/DL (ref 6–20)
BUN/CREAT SERPL: 22 (ref 12–20)
CALCIUM SERPL-MCNC: 10.1 MG/DL (ref 8.5–10.1)
CAOX CRY URNS QL MICRO: ABNORMAL
CHLORIDE SERPL-SCNC: 106 MMOL/L (ref 97–108)
CHOLEST SERPL-MCNC: 172 MG/DL
CK SERPL-CCNC: 89 U/L (ref 39–308)
CO2 SERPL-SCNC: 31 MMOL/L (ref 21–32)
COLOR UR: ABNORMAL
CREAT SERPL-MCNC: 0.79 MG/DL (ref 0.7–1.3)
EPITH CASTS URNS QL MICRO: ABNORMAL /LPF
EST. AVERAGE GLUCOSE BLD GHB EST-MCNC: 123 MG/DL
GLOBULIN SER CALC-MCNC: 3.8 G/DL (ref 2–4)
GLUCOSE SERPL-MCNC: 77 MG/DL (ref 65–100)
GLUCOSE UR STRIP.AUTO-MCNC: NEGATIVE MG/DL
HBA1C MFR BLD: 5.9 % (ref 4–5.6)
HDLC SERPL-MCNC: 59 MG/DL
HDLC SERPL: 2.9 (ref 0–5)
HGB UR QL STRIP: ABNORMAL
KETONES UR QL STRIP.AUTO: NEGATIVE MG/DL
LDLC SERPL CALC-MCNC: 98 MG/DL (ref 0–100)
LEUKOCYTE ESTERASE UR QL STRIP.AUTO: ABNORMAL
NITRITE UR QL STRIP.AUTO: NEGATIVE
PH UR STRIP: 6 (ref 5–8)
POTASSIUM SERPL-SCNC: 4.3 MMOL/L (ref 3.5–5.1)
PROT SERPL-MCNC: 7.6 G/DL (ref 6.4–8.2)
PROT UR STRIP-MCNC: 30 MG/DL
RBC #/AREA URNS HPF: >100 /HPF (ref 0–5)
SODIUM SERPL-SCNC: 141 MMOL/L (ref 136–145)
SP GR UR REFRACTOMETRY: 1.02 (ref 1–1.03)
TRIGL SERPL-MCNC: 75 MG/DL (ref ?–150)
UROBILINOGEN UR QL STRIP.AUTO: 1 EU/DL (ref 0.2–1)
VLDLC SERPL CALC-MCNC: 15 MG/DL
WBC URNS QL MICRO: ABNORMAL /HPF (ref 0–4)

## 2023-01-12 LAB
BACTERIA SPEC CULT: NORMAL
SERVICE CMNT-IMP: NORMAL

## 2023-01-12 RX ORDER — CIPROFLOXACIN 500 MG/1
500 TABLET ORAL 2 TIMES DAILY
Qty: 28 TABLET | Refills: 0 | Status: SHIPPED | OUTPATIENT
Start: 2023-01-12 | End: 2023-01-26

## 2023-01-12 NOTE — PROGRESS NOTES
Results have been reviewed and abnormal results noted. Please see documentation in new EMR. Please call the patient regarding his abnormal result. Labs OK except blood in urine so Cipro 500 BID for 2 weeks and F/U UA. If still has blood in UA will need Urology appt. Discussed with patient. Rx sent to patient's pharmacy. Advised to get f/up ua and culture after treatment. Patient states he does have a urology appointment 1-.

## 2023-01-12 NOTE — TELEPHONE ENCOUNTER
RX refill request from the patient/pharmacy. Patient last seen 01- with labs, and needs to get a f/up ua and culture after treatment. Requested Prescriptions     Pending Prescriptions Disp Refills    ciprofloxacin HCl (CIPRO) 500 mg tablet 28 Tablet 0     Sig: Take 1 Tablet by mouth two (2) times a day for 14 days.

## 2023-01-30 ENCOUNTER — LAB ONLY (OUTPATIENT)
Dept: INTERNAL MEDICINE CLINIC | Age: 83
End: 2023-01-30

## 2023-01-30 DIAGNOSIS — N39.0 FREQUENT UTI: Primary | ICD-10-CM

## 2023-01-31 LAB
APPEARANCE UR: ABNORMAL
BACTERIA URNS QL MICRO: NEGATIVE /HPF
BILIRUB UR QL: NEGATIVE
CAOX CRY URNS QL MICRO: ABNORMAL
COLOR UR: ABNORMAL
EPITH CASTS URNS QL MICRO: ABNORMAL /LPF
GLUCOSE UR STRIP.AUTO-MCNC: 250 MG/DL
HGB UR QL STRIP: ABNORMAL
KETONES UR QL STRIP.AUTO: NEGATIVE MG/DL
LEUKOCYTE ESTERASE UR QL STRIP.AUTO: NEGATIVE
NITRITE UR QL STRIP.AUTO: NEGATIVE
PH UR STRIP: 6 (ref 5–8)
PROT UR STRIP-MCNC: ABNORMAL MG/DL
RBC #/AREA URNS HPF: ABNORMAL /HPF (ref 0–5)
SP GR UR REFRACTOMETRY: 1.02 (ref 1–1.03)
UROBILINOGEN UR QL STRIP.AUTO: 0.2 EU/DL (ref 0.2–1)
WBC URNS QL MICRO: ABNORMAL /HPF (ref 0–4)

## 2023-02-01 LAB
BACTERIA SPEC CULT: NORMAL
SERVICE CMNT-IMP: NORMAL

## 2023-02-01 NOTE — PROGRESS NOTES
Results have been reviewed and abnormal results noted. Please see documentation in new EMR. Please call the patient regarding his abnormal result. Follow-up urine and culture are negative. Discussed results with patient.

## 2023-04-19 NOTE — PROGRESS NOTES
Chief Complaint   Patient presents with    Diabetes     Diabetes f/up, arthritis in back bothering Pt more than usual    Hypertension    Cholesterol Problem       SUBJECTIVE:    Gaurav Ochoa is a 79 y.o. male who returns in follow-up for his medical problems include hypertension, diabetes, hyperlipidemia, DJD, cardiomyopathy, ASCVD and other medical problems. He is taking his medications trying to follow his diet remains physically active. He currently denies any chest pain, shortness of breath or cardiac respiratory complaints. There are no GI or  complaints. He notes no headaches, dizziness or neurologic complaints. There are no current active arthritic complaints and there are no other complaints on complete review of systems. Current Outpatient Medications   Medication Sig Dispense Refill    polyethylene glycol (Purelax) 17 gram/dose powder MIX 17G IN WATER AND DRINK DAILY 850 g 5    metFORMIN (GLUCOPHAGE) 500 mg tablet TAKE 1 TABLET BY MOUTH TWICE A DAY WITH MEALS (Patient taking differently: Indications: patient is taking once daily) 180 Tablet 3    chlorproMAZINE (THORAZINE) 25 mg tablet Take 1 Tablet by mouth three (3) times daily. 21 Tablet 0    atorvastatin (LIPITOR) 20 mg tablet TAKE 1 TABLET BY MOUTH EVERY DAY 90 Tablet 3    amLODIPine (NORVASC) 5 mg tablet Take 1 Tablet by mouth daily. 30 Tablet prn    ramipriL (ALTACE) 10 mg capsule TAKE 2 TABLETS BY MOUTH EVERY  Capsule 3    metoprolol succinate (TOPROL-XL) 25 mg XL tablet TAKE 1 TABLET BY MOUTH EVERY DAY 90 Tablet 3    fluoride, sodium, 1.1 % pste BRUSH WITH A PEA SIZED AMOUNT FOR 2 MINUTES BEFORE BEDTIME. DO NOT RINSE/DRINK/EAT AFTER BRUSHING. Blood-Glucose Meter (True Metrix Glucose Meter) misc Use daily as needed for diabetes management. Diagnosis E11.9 1 Each 0    glucose blood VI test strips (True Metrix Glucose Test Strip) strip Use 1 to 2 times daily as needed for Diabetes management.   Diagnosis is 411.9. 50 Strip 5    omeprazole (PRILOSEC) 20 mg capsule TAKE ONE CAPSULE BY MOUTH ONCE DAILY (Patient taking differently: as needed. TAKE ONE CAPSULE BY MOUTH ONCE DAILY) 90 Cap 3    OTHER       glucose blood VI test strips (TRUETEST TEST STRIPS) strip by Does Not Apply route See Admin Instructions. 100 Strip prn    UBIDECARENONE (CO Q-10 PO) Take  by mouth. OTHER Take 7 Tabs by mouth daily. [facility]      aspirin 81 mg tablet Take 1 Tablet by mouth daily.  Indications: taking one a day       Past Medical History:   Diagnosis Date    Anxiety 8/5/2017    Arrhythmia     Arthritis     Atherosclerotic heart disease of native coronary artery without angina pectoris 8/5/2017    Bradycardia 8/5/2017    CAD (coronary artery disease)     Constipation, chronic 8/5/2017    Diabetes ([address]     diet controlled    Diabetes mellitus ([address] 8/5/2017    Diverticulosis 8/5/2017    ED (erectile dysfunction) 8/5/2017    GERD (gastroesophageal reflux disease)     Hypertension     Hypogonadism male 8/5/2017    Kidney stone     Neuropathy 8/5/2017    On statin therapy 8/5/2017    Right foot pain 8/5/2017     Past Surgical History:   Procedure Laterality Date    COLONOSCOPY N/A 6/21/2016    COLONOSCOPY performed by Ernestine Homans., MD at [address]; HI RISK IND  6/21/2016         HX GI      COLONOSCOPY    HX HEART CATHETERIZATION      HX OTHER SURGICAL      artificial testicle    HX PACEMAKER  10/6/15    MA COLONOSCOPY FLX DX W/COLLJ SPEC WHEN PFRMD  4/8/2011         MA UNLISTED PROCEDURE CARDIAC SURGERY      cabg x4 vessels 1999    UPPER GI ENDOSCOPY,BIOPSY  12/14/2016          Allergies   Allergen Reactions    Caffeine Unknown (comments)     Sweating AND WOOZY AND CAN'T SLEEP    Codeine Other (comments)     Lightheaded, WOOZY AND CAN'T SLEEP    Percocet [Oxycodone-Acetaminophen] Nausea and Vomiting       REVIEW OF SYSTEMS:  General: negative for - chills or fever, or weight loss or gain  ENT: negative for - headaches, nasal congestion or tinnitus  Eyes: no blurred or visual changes  Neck: No stiffness or swollen nodes  Respiratory: negative for - cough, hemoptysis, shortness of breath or wheezing  Cardiovascular : negative for - chest pain, edema, palpitations or shortness of breath  Gastrointestinal: negative for - abdominal pain, blood in stools, heartburn or nausea/vomiting  Genito-Urinary: no dysuria, trouble voiding, or hematuria  Musculoskeletal: negative for - gait disturbance, joint pain, joint stiffness or joint swelling  Neurological: no TIA or stroke symptoms  Hematologic: no bruises, no bleeding  Lymphatic: no swollen glands  Integument: no lumps, mole changes, nail changes or rash  Endocrine:no malaise/lethargy poly uria or polydipsia or unexpected weight changes        Social History     Socioeconomic History    Marital status:    Tobacco Use    Smoking status: Never    Smokeless tobacco: Never   Vaping Use    Vaping Use: Never used   Substance and Sexual Activity    Alcohol use: No    Drug use: No     Family History   Problem Relation Age of Onset    Heart Disease Mother     No Known Problems Father     Cancer Sister     Anesth Problems Neg Hx        OBJECTIVE:     Visit Vitals  /80 (BP 1 Location: Left arm, BP Patient Position: Sitting, BP Cuff Size: Adult)   Pulse 60   Temp 97.6 °F (36.4 °C) (Oral)   Resp 16   Ht 4' 11\" (1.499 m)   Wt 114 lb 1.6 oz (51.8 kg)   SpO2 98%   BMI 23.05 kg/m²     CONSTITUTIONAL:   well nourished, appears age appropriate  EYES: sclera anicteric, PERRL, EOMI  ENMT:nares clear, moist mucous membranes, pharynx clear  NECK: supple.  Thyroid normal, No JVD or bruits  RESPIRATORY: Chest: clear to ascultation and percussion, normal inspiratory effort  CARDIOVASCULAR: Heart: regular rate and rhythm no murmurs, rubs or gallops, PMI not displaced, No thrills, no peripheral edema  GASTROINTESTINAL: Abdomen: non distended, soft, non tender, bowel sounds normal  HEMATOLOGIC: no purpura, petechiae or bruising  LYMPHATIC: No lymph node enlargemant  MUSCULOSKELETAL: Extremities: no active synovitis, pulse 1+   INTEGUMENT: No unusual rashes or suspicious skin lesions noted. Nails appear normal.  PERIPHERAL VASCULAR: normal pulses femoral, PT and DP  NEUROLOGIC: non-focal exam, A & O X 3  PSYCHIATRIC:, appropriate affect     ASSESSMENT:   1. Essential hypertension    2. Controlled type 2 diabetes mellitus with diabetic polyneuropathy, without long-term current use of insulin ([facility]    3. Mixed hyperlipidemia    4. Cardiomyopathy, unspecified type ([address]    5. ASCVD (arteriosclerotic cardiovascular disease)    6. Primary osteoarthritis involving multiple joints      Impression  1. Hypertension that is controlled so continue current therapy reviewed with him. 2.  Diabetes repeat status pending prior lab reviewed  3. Hyperlipidemia prior lab reviewed repeat status pending  4 cardiomyopathy stable   5 ASCVD clinically stable continue aspirin daily  6. DJD stable except for his back bothering him which I did recommend taking Tylenol arthritis 2 twice a day  Follow-up 3 months or sooner if there is a problem. I will call with lab results in interim. PLAN:  .  Orders Placed This Encounter    LIPID PANEL    METABOLIC PANEL, COMPREHENSIVE    CK    HEMOGLOBIN A1C WITH EAG    polyethylene glycol (Purelax) 17 gram/dose powder         ATTENTION:   This medical record was transcribed using an electronic medical records system. Although proofread, it may and can contain electronic and spelling errors. Other human spelling and other errors may be present. Corrections may be executed at a later time. Please feel free to contact us for any clarifications as needed. Follow-up and Dispositions    Return in about 3 months (around 7/20/2023). No results found for any visits on 04/20/23.     Kermit Lopez MD    The patient verbalized understanding of the problems and plans as explained.

## 2023-04-20 ENCOUNTER — OFFICE VISIT (OUTPATIENT)
Dept: INTERNAL MEDICINE CLINIC | Age: 83
End: 2023-04-20
Payer: MEDICARE

## 2023-04-20 VITALS
HEIGHT: 60 IN | TEMPERATURE: 97.6 F | DIASTOLIC BLOOD PRESSURE: 80 MMHG | SYSTOLIC BLOOD PRESSURE: 110 MMHG | WEIGHT: 114.1 LBS | RESPIRATION RATE: 16 BRPM | HEART RATE: 60 BPM | OXYGEN SATURATION: 98 % | BODY MASS INDEX: 22.4 KG/M2

## 2023-04-20 DIAGNOSIS — E11.42 CONTROLLED TYPE 2 DIABETES MELLITUS WITH DIABETIC POLYNEUROPATHY, WITHOUT LONG-TERM CURRENT USE OF INSULIN (HCC): ICD-10-CM

## 2023-04-20 DIAGNOSIS — I10 ESSENTIAL HYPERTENSION: Primary | ICD-10-CM

## 2023-04-20 DIAGNOSIS — I42.9 CARDIOMYOPATHY, UNSPECIFIED TYPE (HCC): ICD-10-CM

## 2023-04-20 DIAGNOSIS — I25.10 ASCVD (ARTERIOSCLEROTIC CARDIOVASCULAR DISEASE): ICD-10-CM

## 2023-04-20 DIAGNOSIS — E78.2 MIXED HYPERLIPIDEMIA: ICD-10-CM

## 2023-04-20 DIAGNOSIS — M15.9 PRIMARY OSTEOARTHRITIS INVOLVING MULTIPLE JOINTS: ICD-10-CM

## 2023-04-20 PROCEDURE — 99214 OFFICE O/P EST MOD 30 MIN: CPT | Performed by: INTERNAL MEDICINE

## 2023-04-20 PROCEDURE — 1123F ACP DISCUSS/DSCN MKR DOCD: CPT | Performed by: INTERNAL MEDICINE

## 2023-04-20 PROCEDURE — G8510 SCR DEP NEG, NO PLAN REQD: HCPCS | Performed by: INTERNAL MEDICINE

## 2023-04-20 PROCEDURE — G8420 CALC BMI NORM PARAMETERS: HCPCS | Performed by: INTERNAL MEDICINE

## 2023-04-20 PROCEDURE — G8427 DOCREV CUR MEDS BY ELIG CLIN: HCPCS | Performed by: INTERNAL MEDICINE

## 2023-04-20 PROCEDURE — 3074F SYST BP LT 130 MM HG: CPT | Performed by: INTERNAL MEDICINE

## 2023-04-20 PROCEDURE — G8536 NO DOC ELDER MAL SCRN: HCPCS | Performed by: INTERNAL MEDICINE

## 2023-04-20 PROCEDURE — 1101F PT FALLS ASSESS-DOCD LE1/YR: CPT | Performed by: INTERNAL MEDICINE

## 2023-04-20 PROCEDURE — 3079F DIAST BP 80-89 MM HG: CPT | Performed by: INTERNAL MEDICINE

## 2023-04-20 PROCEDURE — 3044F HG A1C LEVEL LT 7.0%: CPT | Performed by: INTERNAL MEDICINE

## 2023-04-20 RX ORDER — POLYETHYLENE GLYCOL 3350 17 G/17G
POWDER, FOR SOLUTION ORAL
Qty: 850 G | Refills: 5 | Status: SHIPPED | OUTPATIENT
Start: 2023-04-20

## 2023-04-20 NOTE — PROGRESS NOTES
Chief Complaint   Patient presents with    Diabetes     Diabetes f/up, arthritis in back bothering Pt more than usual    Hypertension    Cholesterol Problem    1. Have you been to the ER, urgent care clinic since your last visit? Hospitalized since your last visit? No    2. Have you seen or consulted any other health care providers outside of the [address] [place] [address] since your last visit? Include any pap smears or colon screening.  No

## 2023-04-21 LAB
ALBUMIN SERPL-MCNC: 4 G/DL (ref 3.5–5)
ALBUMIN/GLOB SERPL: 1.2 (ref 1.1–2.2)
ALP SERPL-CCNC: 78 U/L (ref 45–117)
ALT SERPL-CCNC: 30 U/L (ref 12–78)
ANION GAP SERPL CALC-SCNC: 3 MMOL/L (ref 5–15)
AST SERPL-CCNC: 28 U/L (ref 15–37)
BILIRUB SERPL-MCNC: 1.1 MG/DL (ref 0.2–1)
BUN SERPL-MCNC: 18 MG/DL (ref 6–20)
BUN/CREAT SERPL: 21 (ref 12–20)
CALCIUM SERPL-MCNC: 10.2 MG/DL (ref 8.5–10.1)
CHLORIDE SERPL-SCNC: 108 MMOL/L (ref 97–108)
CHOLEST SERPL-MCNC: 155 MG/DL
CK SERPL-CCNC: 176 U/L (ref 39–308)
CO2 SERPL-SCNC: 29 MMOL/L (ref 21–32)
CREAT SERPL-MCNC: 0.86 MG/DL (ref 0.7–1.3)
EST. AVERAGE GLUCOSE BLD GHB EST-MCNC: 126 MG/DL
GLOBULIN SER CALC-MCNC: 3.4 G/DL (ref 2–4)
GLUCOSE SERPL-MCNC: 78 MG/DL (ref 65–100)
HBA1C MFR BLD: 6 % (ref 4–5.6)
HDLC SERPL-MCNC: 62 MG/DL
HDLC SERPL: 2.5 (ref 0–5)
LDLC SERPL CALC-MCNC: 79.8 MG/DL (ref 0–100)
POTASSIUM SERPL-SCNC: 4.4 MMOL/L (ref 3.5–5.1)
PROT SERPL-MCNC: 7.4 G/DL (ref 6.4–8.2)
SODIUM SERPL-SCNC: 140 MMOL/L (ref 136–145)
TRIGL SERPL-MCNC: 66 MG/DL (ref ?–150)
VLDLC SERPL CALC-MCNC: 13.2 MG/DL

## 2023-04-27 ENCOUNTER — OFFICE VISIT (OUTPATIENT)
Dept: INTERNAL MEDICINE CLINIC | Age: 83
End: 2023-04-27
Payer: MEDICARE

## 2023-04-27 VITALS
DIASTOLIC BLOOD PRESSURE: 76 MMHG | TEMPERATURE: 97.7 F | BODY MASS INDEX: 22.63 KG/M2 | WEIGHT: 115.25 LBS | HEART RATE: 60 BPM | OXYGEN SATURATION: 96 % | SYSTOLIC BLOOD PRESSURE: 134 MMHG | RESPIRATION RATE: 16 BRPM | HEIGHT: 60 IN

## 2023-04-27 DIAGNOSIS — I10 ESSENTIAL HYPERTENSION: Primary | ICD-10-CM

## 2023-04-27 DIAGNOSIS — I42.9 CARDIOMYOPATHY, UNSPECIFIED TYPE (HCC): ICD-10-CM

## 2023-04-27 DIAGNOSIS — R60.9 EDEMA, UNSPECIFIED TYPE: ICD-10-CM

## 2023-04-27 PROCEDURE — G8536 NO DOC ELDER MAL SCRN: HCPCS | Performed by: INTERNAL MEDICINE

## 2023-04-27 PROCEDURE — 99213 OFFICE O/P EST LOW 20 MIN: CPT | Performed by: INTERNAL MEDICINE

## 2023-04-27 PROCEDURE — 1123F ACP DISCUSS/DSCN MKR DOCD: CPT | Performed by: INTERNAL MEDICINE

## 2023-04-27 PROCEDURE — G8510 SCR DEP NEG, NO PLAN REQD: HCPCS | Performed by: INTERNAL MEDICINE

## 2023-04-27 PROCEDURE — G8427 DOCREV CUR MEDS BY ELIG CLIN: HCPCS | Performed by: INTERNAL MEDICINE

## 2023-04-27 PROCEDURE — 3078F DIAST BP <80 MM HG: CPT | Performed by: INTERNAL MEDICINE

## 2023-04-27 PROCEDURE — 1101F PT FALLS ASSESS-DOCD LE1/YR: CPT | Performed by: INTERNAL MEDICINE

## 2023-04-27 PROCEDURE — G8420 CALC BMI NORM PARAMETERS: HCPCS | Performed by: INTERNAL MEDICINE

## 2023-04-27 PROCEDURE — 3075F SYST BP GE 130 - 139MM HG: CPT | Performed by: INTERNAL MEDICINE

## 2023-04-27 RX ORDER — BUMETANIDE 0.5 MG/1
0.5 TABLET ORAL DAILY
Qty: 30 TABLET | Status: SHIPPED | OUTPATIENT
Start: 2023-04-27

## 2023-04-27 NOTE — PROGRESS NOTES
Subjective:   Eda Tabor is a 79 y.o. male      Chief Complaint   Patient presents with    Foot Swelling    Medication Evaluation        History of present illness: He presents today noting his feet have been swelling toward the end of the day. He is curious as to why that he has. He notes no shortness of breath, chest pain, palpitations, PND, orthopnea or other cardiac respiratory complaints. There are no current GI or  complaints. He notes no headaches, dizziness or neurologic complaints. He has no current active arthritic complaints and there are no other complaints noted.     Patient Active Problem List   Diagnosis Code    Cardiomyopathy ([facility] I42.9    ASCVD (arteriosclerotic cardiovascular disease) I25.10    Essential hypertension I10    Mixed hyperlipidemia E78.2    Primary osteoarthritis involving multiple joints M15.9    Diverticulosis K57.90    Neuropathy G62.9    Hypogonadism male E29.1    ED (erectile dysfunction) N52.9    Controlled type 2 diabetes mellitus with diabetic polyneuropathy, without long-term current use of insulin ([facility]) E11.42    Constipation, chronic K59.09    Bradycardia R00.1    Anxiety F41.9    Epigastric pain R10.13    Neck pain M54.2    Chest pain R07.9    Alcohol screening Z13.39    Vitamin D deficiency E55.9    Gross hematuria R31.0    Edema R60.9      Past Medical History:   Diagnosis Date    Anxiety 8/5/2017    Arrhythmia     Arthritis     Atherosclerotic heart disease of native coronary artery without angina pectoris 8/5/2017    Bradycardia 8/5/2017    CAD (coronary artery disease)     Constipation, chronic 8/5/2017    Diabetes ([facility]     diet controlled    Diabetes mellitus ([facility] 8/5/2017    Diverticulosis 8/5/2017    ED (erectile dysfunction) 8/5/2017    GERD (gastroesophageal reflux disease)     Hypertension     Hypogonadism male 8/5/2017    Kidney stone     Neuropathy 8/5/2017    On statin therapy 8/5/2017    Right foot pain 8/5/2017      Allergies   Allergen Reactions Caffeine Unknown (comments)     Sweating AND WOOZY AND CAN'T SLEEP    Codeine Other (comments)     Lightheaded, WOOZY AND CAN'T SLEEP    Percocet [Oxycodone-Acetaminophen] Nausea and Vomiting      Family History   Problem Relation Age of Onset    Heart Disease Mother     No Known Problems Father     Cancer Sister     Anesth Problems Neg Hx       Social History     Socioeconomic History    Marital status:      Spouse name: Not on file    Number of children: Not on file    Years of education: Not on file    Highest education level: Not on file   Occupational History    Not on file   Tobacco Use    Smoking status: Never    Smokeless tobacco: Never   Vaping Use    Vaping Use: Never used   Substance and Sexual Activity    Alcohol use: No    Drug use: No    Sexual activity: Not on file   Other Topics Concern    Not on file   Social History Narrative    Not on file     Social Determinants of Health     Financial Resource Strain: Not on file   Food Insecurity: Not on file   Transportation Needs: Not on file   Physical Activity: Not on file   Stress: Not on file   Social Connections: Not on file   Intimate Partner Violence: Not on file   Housing Stability: Not on file     Prior to Admission medications    Medication Sig Start Date End Date Taking? Authorizing Provider   bumetanide (BUMEX) 0.5 mg tablet Take 1 Tablet by mouth daily. 4/27/23  Yes Lyric Metz MD   polyethylene glycol (Purelax) 17 gram/dose powder MIX 17G IN WATER AND DRINK DAILY 4/20/23  Yes Lyric Metz MD   metFORMIN (GLUCOPHAGE) 500 mg tablet TAKE 1 TABLET BY MOUTH TWICE A DAY WITH MEALS  Patient taking differently: Indications: patient is taking once daily 11/2/22  Yes Lyric Metz MD   atorvastatin (LIPITOR) 20 mg tablet TAKE 1 TABLET BY MOUTH EVERY DAY 7/28/22  Yes Lyric Metz MD   amLODIPine (NORVASC) 5 mg tablet Take 1 Tablet by mouth daily.  6/27/22  Yes Lyric Metz MD   ramipriL (ALTACE) 10 mg capsule TAKE 2 TABLETS BY MOUTH EVERY DAY 5/2/22  Yes Aparna Perez MD   metoprolol succinate (TOPROL-XL) 25 mg XL tablet TAKE 1 TABLET BY MOUTH EVERY DAY 5/2/22  Yes Aparna Perez MD   fluoride, sodium, 1.1 % pste BRUSH WITH A PEA SIZED AMOUNT FOR 2 MINUTES BEFORE BEDTIME. DO NOT RINSE/DRINK/EAT AFTER BRUSHING. 5/5/21  Yes Provider, Historical   Blood-Glucose Meter (True Metrix Glucose Meter) misc Use daily as needed for diabetes management. Diagnosis E11.9 7/26/21  Yes Aparna Perez MD   glucose blood VI test strips (True Metrix Glucose Test Strip) strip Use 1 to 2 times daily as needed for Diabetes management. Diagnosis is 411.9. 7/26/21  Yes Aparna Perez MD   omeprazole (PRILOSEC) 20 mg capsule TAKE ONE CAPSULE BY MOUTH ONCE DAILY  Patient taking differently: as needed. TAKE ONE CAPSULE BY MOUTH ONCE DAILY 10/28/19  Yes Aparna Perez MD   OTHER    Yes Provider, Historical   glucose blood VI test strips (TRUETEST TEST STRIPS) strip by Does Not Apply route See Admin Instructions. 11/20/17  Yes Aparna Perez MD   UBIDECARENONE (CO Q-10 PO) Take  by mouth. Yes Provider, Historical   OTHER Take 7 Tabs by mouth daily. [address]   Yes Provider, Historical   aspirin 81 mg tablet Take 1 Tablet by mouth daily. Indications: taking one a day 4/7/11  Yes Provider, Historical   chlorproMAZINE (THORAZINE) 25 mg tablet Take 1 Tablet by mouth three (3) times daily. 10/5/22   Aparna Perez MD        Review of Systems              Constitutional:  He denies fever, weight loss, sweats or fatigue. EYES: No blurred or double vision,               ENT: no nasal congestion, no headache or dizziness. No difficulty with               swallowing, taste, speech or smell. Respiratory:  No cough, wheezing or shortness of breath. No sputum production. Cardiac:  Denies chest pain, palpitations, unexplained indigestion, syncope, edema, PND or orthopnea.     GI:  No changes in bowel movements, no abdominal pain, no bloating, anorexia, nausea, vomiting or heartburn. :  No frequency or dysuria. Denies incontinence or sexual dysfunction. Extremities:  No joint pain, stiffness but swelling in the feet noted in today  Back:.no pain or soreness  Skin:  No recent rashes or mole changes. Neurological:  No numbness, tingling, burning paresthesias or loss of motor strength. No syncope, dizziness, frequent headaches or memory loss. Hematologic:  No easy bruising  Lymphatic: No lymph node enlargement    Objective:     Vitals:    04/27/23 1105 04/27/23 1108 04/27/23 1124   BP: (!) 145/83 138/81 134/76   Pulse: 60     Resp: 16     Temp: 97.7 °F (36.5 °C)     TempSrc: Oral     SpO2: 96%     Weight: 115 lb 4 oz (52.3 kg)     Height: 4' 11\" (1.499 m)     PainSc:   0 - No pain         Body mass index is 23.28 kg/m². Physical Examination:              General Appearance:  Well-developed, well-nourished, no acute distress. HEENT:      Ears:  The TMs and ear canals were clear. Eyes:  The pupillary responses were normal.  Extraocular muscle function intact. Lids and conjunctiva not injected. Funduscopic exam revealed sharp disc margins. Nares: Clear w/o edema or erythema  Pharynx:  Clear with teeth in good repair. No masses were noted. Neck:  Supple without thyromegaly or adenopathy. No JVD noted. No carotid                bruits. Lungs:  Clear to auscultation and percussion. Cardiac:  Regular rate and rhythm without murmur. PMI not displaced. No gallop, rub or click. Abdominal: Soft, non-tender, no hepata-spleenomegally or masses  Extremities:  No clubbing, cyanosis but with trace bilateral edema. Skin:  No rash or unusual mole changes noted. Lymph Nodes:  None felt in the cervical, supraclavicular, axillary or inguinal region. Neurological: . DTRs 2+ and symmetric.   Station and gait normal.   Hematologic:   No purpura or petechiae        Assessment/Plan: 1. Essential hypertension    2. Edema, unspecified type    3. Cardiomyopathy, unspecified type ([address]        Impressions/Plan:  Impression  1. Hypertension is controlled  2. Peripheral edema question etiology possibly related to the Norvasc but at this point I will add Bumex 0.5 daily and get him back in a week. 3.  Cardiomyopathy may have to repeat an echocardiogram  Follow-up 1 week or sooner if there is a problem. Orders Placed This Encounter    bumetanide (BUMEX) 0.5 mg tablet       Follow-up and Dispositions    Return in about 1 week (around 5/4/2023). No results found for any visits on 04/27/23. Luis Meraz MD    The patient was given after the visit summary the patient verbalized an understanding of the plans and problems as explained.

## 2023-04-27 NOTE — PROGRESS NOTES
Chief Complaint   Patient presents with    Foot Swelling    Medication Evaluation       Vitals:    04/27/23 1105 04/27/23 1108   BP: (!) 145/83 138/81   Pulse: 60    Resp: 16    Temp: 97.7 °F (36.5 °C)    TempSrc: Oral    SpO2: 96%    Weight: 115 lb 4 oz (52.3 kg)    Height: 4' 11\" (1.499 m)    PainSc:   0 - No pain          Health Maintenance will be followed up by PCP. 1. Have you been to the ER, urgent care clinic since your last visit? Hospitalized since your last visit? No    2. Have you seen or consulted any other health care providers outside of the [address] [place] [address] since your last visit? Include any pap smears or colon screening.  No

## 2023-04-29 DIAGNOSIS — I10 ESSENTIAL HYPERTENSION: ICD-10-CM

## 2023-05-01 RX ORDER — METOPROLOL SUCCINATE 25 MG/1
TABLET, EXTENDED RELEASE ORAL
Qty: 90 TABLET | Refills: 3 | Status: SHIPPED | OUTPATIENT
Start: 2023-05-01 | End: 2023-05-01 | Stop reason: SDUPTHER

## 2023-05-01 RX ORDER — RAMIPRIL 10 MG/1
CAPSULE ORAL
Qty: 180 CAPSULE | Refills: 3 | Status: SHIPPED | OUTPATIENT
Start: 2023-05-01 | End: 2023-05-01 | Stop reason: SDUPTHER

## 2023-05-01 NOTE — TELEPHONE ENCOUNTER
RX refill request from the patient/pharmacy. Patient last seen 04- with labs, and next appt. scheduled for 05-  Requested Prescriptions     Pending Prescriptions Disp Refills    metoprolol succinate (TOPROL-XL) 25 mg XL tablet [Pharmacy Med Name: METOPROLOL SUCC ER 25 MG TAB] 90 Tablet 3     Sig: TAKE 1 TABLET BY MOUTH EVERY DAY    ramipriL (ALTACE) 10 mg capsule [Pharmacy Med Name: RAMIPRIL 10 MG CAPSULE] 180 Capsule 3     Sig: TAKE 2 CAPSULES BY MOUTH EVERY DAY    .

## 2023-05-04 ENCOUNTER — OFFICE VISIT (OUTPATIENT)
Dept: INTERNAL MEDICINE CLINIC | Age: 83
End: 2023-05-04
Payer: MEDICARE

## 2023-05-04 VITALS
TEMPERATURE: 97.9 F | RESPIRATION RATE: 16 BRPM | HEART RATE: 65 BPM | DIASTOLIC BLOOD PRESSURE: 80 MMHG | SYSTOLIC BLOOD PRESSURE: 132 MMHG | WEIGHT: 113.7 LBS | BODY MASS INDEX: 22.32 KG/M2 | OXYGEN SATURATION: 98 % | HEIGHT: 60 IN

## 2023-05-04 DIAGNOSIS — I10 ESSENTIAL HYPERTENSION: Primary | ICD-10-CM

## 2023-05-04 DIAGNOSIS — I42.9 CARDIOMYOPATHY, UNSPECIFIED TYPE (HCC): ICD-10-CM

## 2023-05-04 DIAGNOSIS — R60.9 EDEMA, UNSPECIFIED TYPE: ICD-10-CM

## 2023-05-04 PROCEDURE — G8536 NO DOC ELDER MAL SCRN: HCPCS | Performed by: INTERNAL MEDICINE

## 2023-05-04 PROCEDURE — 99213 OFFICE O/P EST LOW 20 MIN: CPT | Performed by: INTERNAL MEDICINE

## 2023-05-04 PROCEDURE — 1101F PT FALLS ASSESS-DOCD LE1/YR: CPT | Performed by: INTERNAL MEDICINE

## 2023-05-04 PROCEDURE — G8420 CALC BMI NORM PARAMETERS: HCPCS | Performed by: INTERNAL MEDICINE

## 2023-05-04 PROCEDURE — 3075F SYST BP GE 130 - 139MM HG: CPT | Performed by: INTERNAL MEDICINE

## 2023-05-04 PROCEDURE — G8427 DOCREV CUR MEDS BY ELIG CLIN: HCPCS | Performed by: INTERNAL MEDICINE

## 2023-05-04 PROCEDURE — 3078F DIAST BP <80 MM HG: CPT | Performed by: INTERNAL MEDICINE

## 2023-05-04 PROCEDURE — 1123F ACP DISCUSS/DSCN MKR DOCD: CPT | Performed by: INTERNAL MEDICINE

## 2023-05-04 PROCEDURE — G8510 SCR DEP NEG, NO PLAN REQD: HCPCS | Performed by: INTERNAL MEDICINE

## 2023-05-05 LAB
ANION GAP SERPL CALC-SCNC: 5 MMOL/L (ref 5–15)
BUN SERPL-MCNC: 20 MG/DL (ref 6–20)
BUN/CREAT SERPL: 22 (ref 12–20)
CALCIUM SERPL-MCNC: 10 MG/DL (ref 8.5–10.1)
CHLORIDE SERPL-SCNC: 103 MMOL/L (ref 97–108)
CO2 SERPL-SCNC: 30 MMOL/L (ref 21–32)
CREAT SERPL-MCNC: 0.93 MG/DL (ref 0.7–1.3)
GLUCOSE SERPL-MCNC: 96 MG/DL (ref 65–100)
POTASSIUM SERPL-SCNC: 4.4 MMOL/L (ref 3.5–5.1)
SODIUM SERPL-SCNC: 138 MMOL/L (ref 136–145)

## 2023-05-23 ENCOUNTER — OFFICE VISIT (OUTPATIENT)
Facility: CLINIC | Age: 83
End: 2023-05-23
Payer: MEDICARE

## 2023-05-23 VITALS
TEMPERATURE: 98 F | HEART RATE: 60 BPM | HEIGHT: 60 IN | WEIGHT: 111.9 LBS | DIASTOLIC BLOOD PRESSURE: 74 MMHG | SYSTOLIC BLOOD PRESSURE: 138 MMHG | OXYGEN SATURATION: 98 % | BODY MASS INDEX: 21.97 KG/M2

## 2023-05-23 DIAGNOSIS — M15.9 PRIMARY OSTEOARTHRITIS INVOLVING MULTIPLE JOINTS: ICD-10-CM

## 2023-05-23 DIAGNOSIS — M16.11 PRIMARY OSTEOARTHRITIS OF RIGHT HIP: ICD-10-CM

## 2023-05-23 DIAGNOSIS — M25.551 ARTHRALGIA OF RIGHT SIDE OF PELVIS: Primary | ICD-10-CM

## 2023-05-23 PROCEDURE — 1123F ACP DISCUSS/DSCN MKR DOCD: CPT | Performed by: PHYSICIAN ASSISTANT

## 2023-05-23 PROCEDURE — 99213 OFFICE O/P EST LOW 20 MIN: CPT | Performed by: PHYSICIAN ASSISTANT

## 2023-05-23 PROCEDURE — 3075F SYST BP GE 130 - 139MM HG: CPT | Performed by: PHYSICIAN ASSISTANT

## 2023-05-23 PROCEDURE — 3078F DIAST BP <80 MM HG: CPT | Performed by: PHYSICIAN ASSISTANT

## 2023-05-23 PROCEDURE — G8427 DOCREV CUR MEDS BY ELIG CLIN: HCPCS | Performed by: PHYSICIAN ASSISTANT

## 2023-05-23 PROCEDURE — G8420 CALC BMI NORM PARAMETERS: HCPCS | Performed by: PHYSICIAN ASSISTANT

## 2023-05-23 PROCEDURE — 1036F TOBACCO NON-USER: CPT | Performed by: PHYSICIAN ASSISTANT

## 2023-05-23 ASSESSMENT — PATIENT HEALTH QUESTIONNAIRE - PHQ9
1. LITTLE INTEREST OR PLEASURE IN DOING THINGS: 0
SUM OF ALL RESPONSES TO PHQ QUESTIONS 1-9: 0
SUM OF ALL RESPONSES TO PHQ9 QUESTIONS 1 & 2: 0
SUM OF ALL RESPONSES TO PHQ QUESTIONS 1-9: 0
2. FEELING DOWN, DEPRESSED OR HOPELESS: 0

## 2023-05-23 NOTE — PROGRESS NOTES
Chief Complaint   Patient presents with    Lower Back Pain     On his right side x 3-4 days      1. Have you been to the ER, urgent care clinic since your last visit? Hospitalized since your last visit? No    2. Have you seen or consulted any other health care providers outside of the [address] [place] [address] since your last visit? Include any pap smears or colon screening.  No

## 2023-05-23 NOTE — PROGRESS NOTES
HPI:  79 y.o.  presents for acute visit c/o right side back pain x 3 days. He has chronic, intermittent low back pain, but this feels in a slightly different location and more severe. He takes an OTC arthritis pill that he just started after last visit 5/4/23, for the low back pain, and it did not seem to help this new right side pain. No radiation to buttocks, groin, or down leg. Notes the pain when he positions to get in the car and while moving the leg when driving. Past Medical History:   Diagnosis Date    Anxiety 8/5/2017    Arrhythmia     Arthritis     Atherosclerotic heart disease of native coronary artery without angina pectoris 8/5/2017    Bradycardia 8/5/2017    CAD (coronary artery disease)     Constipation, chronic 8/5/2017    Diabetes ([address]     diet controlled    Diabetes mellitus ([facility] 8/5/2017    Diverticulosis 8/5/2017    ED (erectile dysfunction) 8/5/2017    GERD (gastroesophageal reflux disease)     Hypertension     Hypogonadism male 8/5/2017    Kidney stone     Neuropathy 8/5/2017    On statin therapy 8/5/2017    Right foot pain 8/5/2017       Social History     Tobacco Use    Smoking status: Never    Smokeless tobacco: Never   Vaping Use    Vaping Use: Never used   Substance Use Topics    Alcohol use: No    Drug use: No           Allergies   Allergen Reactions    Caffeine      Other reaction(s): Unknown (comments)  Sweating AND WOOZY AND CAN'T SLEEP    Codeine Other (See Comments)     Lightheaded, WOOZY AND CAN'T SLEEP    Oxycodone-Acetaminophen Nausea And Vomiting       ROS:  ROS negative except as per HPI.       PE:  /74 (Site: Left Upper Arm, Position: Sitting, Cuff Size: Medium Adult)   Pulse 60   Temp 98 °F (36.7 °C)   Ht 4' 11\" (1.499 m)   Wt 111 lb 14.4 oz (50.8 kg)   SpO2 98%   BMI 22.60 kg/m²   Gen: alert, oriented, no acute distress  Head: normocephalic, atraumatic  Neck: supple  Resp: no increase work of breathing, lungs clear to ausculation bilaterally, no wheezing,

## 2023-06-19 PROBLEM — E55.9 VITAMIN D DEFICIENCY: Status: ACTIVE | Noted: 2022-09-26

## 2023-06-19 PROBLEM — M15.9 PRIMARY OSTEOARTHRITIS INVOLVING MULTIPLE JOINTS: Status: ACTIVE | Noted: 2017-01-03

## 2023-06-19 PROBLEM — E11.42 CONTROLLED TYPE 2 DIABETES MELLITUS WITH DIABETIC POLYNEUROPATHY, WITHOUT LONG-TERM CURRENT USE OF INSULIN (HCC): Status: ACTIVE | Noted: 2017-08-05

## 2023-06-19 PROBLEM — I25.10 ASCVD (ARTERIOSCLEROTIC CARDIOVASCULAR DISEASE): Status: ACTIVE | Noted: 2017-01-03

## 2023-06-19 PROBLEM — G62.9 NEUROPATHY: Status: ACTIVE | Noted: 2017-08-05

## 2023-06-19 PROBLEM — K57.90 DIVERTICULOSIS: Status: ACTIVE | Noted: 2017-08-05

## 2023-06-19 PROBLEM — I10 ESSENTIAL HYPERTENSION: Status: ACTIVE | Noted: 2017-01-03

## 2023-06-19 PROBLEM — E78.2 MIXED HYPERLIPIDEMIA: Status: ACTIVE | Noted: 2017-01-03

## 2023-06-19 PROBLEM — M15.0 PRIMARY OSTEOARTHRITIS INVOLVING MULTIPLE JOINTS: Status: ACTIVE | Noted: 2017-01-03

## 2023-06-19 PROBLEM — I42.9 CARDIOMYOPATHY (HCC): Status: ACTIVE | Noted: 2017-01-03

## 2023-06-19 PROBLEM — F41.9 ANXIETY: Status: ACTIVE | Noted: 2017-08-05

## 2023-06-19 PROBLEM — R00.1 BRADYCARDIA: Status: ACTIVE | Noted: 2017-08-05

## 2023-06-20 ENCOUNTER — OFFICE VISIT (OUTPATIENT)
Facility: CLINIC | Age: 83
End: 2023-06-20
Payer: MEDICARE

## 2023-06-20 VITALS
SYSTOLIC BLOOD PRESSURE: 137 MMHG | WEIGHT: 112.5 LBS | RESPIRATION RATE: 16 BRPM | DIASTOLIC BLOOD PRESSURE: 82 MMHG | BODY MASS INDEX: 22.09 KG/M2 | TEMPERATURE: 97.9 F | HEIGHT: 60 IN | OXYGEN SATURATION: 98 % | HEART RATE: 60 BPM

## 2023-06-20 DIAGNOSIS — R60.9 EDEMA, UNSPECIFIED TYPE: Primary | ICD-10-CM

## 2023-06-20 DIAGNOSIS — M54.50 CHRONIC MIDLINE LOW BACK PAIN WITHOUT SCIATICA: ICD-10-CM

## 2023-06-20 DIAGNOSIS — I10 ESSENTIAL HYPERTENSION: ICD-10-CM

## 2023-06-20 DIAGNOSIS — I42.9 CARDIOMYOPATHY, UNSPECIFIED TYPE (HCC): ICD-10-CM

## 2023-06-20 DIAGNOSIS — G89.29 CHRONIC MIDLINE LOW BACK PAIN WITHOUT SCIATICA: ICD-10-CM

## 2023-06-20 PROCEDURE — 1036F TOBACCO NON-USER: CPT | Performed by: INTERNAL MEDICINE

## 2023-06-20 PROCEDURE — G8427 DOCREV CUR MEDS BY ELIG CLIN: HCPCS | Performed by: INTERNAL MEDICINE

## 2023-06-20 PROCEDURE — 1123F ACP DISCUSS/DSCN MKR DOCD: CPT | Performed by: INTERNAL MEDICINE

## 2023-06-20 PROCEDURE — 99213 OFFICE O/P EST LOW 20 MIN: CPT | Performed by: INTERNAL MEDICINE

## 2023-06-20 PROCEDURE — G8420 CALC BMI NORM PARAMETERS: HCPCS | Performed by: INTERNAL MEDICINE

## 2023-06-20 PROCEDURE — 3079F DIAST BP 80-89 MM HG: CPT | Performed by: INTERNAL MEDICINE

## 2023-06-20 PROCEDURE — 3075F SYST BP GE 130 - 139MM HG: CPT | Performed by: INTERNAL MEDICINE

## 2023-06-20 ASSESSMENT — PATIENT HEALTH QUESTIONNAIRE - PHQ9
2. FEELING DOWN, DEPRESSED OR HOPELESS: 0
SUM OF ALL RESPONSES TO PHQ9 QUESTIONS 1 & 2: 0
SUM OF ALL RESPONSES TO PHQ QUESTIONS 1-9: 0
1. LITTLE INTEREST OR PLEASURE IN DOING THINGS: 0
SUM OF ALL RESPONSES TO PHQ QUESTIONS 1-9: 0

## 2023-06-20 NOTE — PROGRESS NOTES
Chief Complaint   Patient presents with    Follow-up       SUBJECTIVE:    Tiago Almonte is a 79 y.o. male who returns in follow-up regarding his cardiomyopathy, recent evaluation for mild peripheral edema, and hypertension. He also has noted a little bit of low back pain and is curious what to take for that. He does note good diuresis with the Bumex 0.5 that was started. He currently denies any chest pain, shortness of breath or cardiorespiratory complaints. There are no GI or  complaints. He has no other complaints on complete review of systems other than the low back pain. He has been taking 1 Tylenol arthritis for that. Current Outpatient Medications   Medication Sig Dispense Refill    diclofenac sodium (VOLTAREN) 1 % GEL Apply 4 g topically 4 times daily 350 g 2    amLODIPine (NORVASC) 5 MG tablet Take 1 tablet by mouth daily      atorvastatin (LIPITOR) 20 MG tablet Take 1 tablet by mouth daily      metFORMIN (GLUCOPHAGE) 500 MG tablet Take 1 tablet by mouth 2 times daily (with meals)      metoprolol succinate (TOPROL XL) 25 MG extended release tablet Take 1 tablet by mouth daily      omeprazole (PRILOSEC) 20 MG delayed release capsule Take 1 capsule by mouth daily      polyethylene glycol (GLYCOLAX) 17 GM/SCOOP powder MIX 17G IN WATER AND DRINK DAILY      ramipril (ALTACE) 10 MG capsule Take 2 tablets by mouth daily      SODIUM FLUORIDE, DENTAL GEL, 1.1 % GEL BRUSH WITH A PEA SIZED AMOUNT FOR 2 MINUTES BEFORE BEDTIME. DO NOT RINSE/DRINK/EAT AFTER BRUSHING. No current facility-administered medications for this visit.      Past Medical History:   Diagnosis Date    Anxiety 8/5/2017    Arrhythmia     Arthritis     Atherosclerotic heart disease of native coronary artery without angina pectoris 8/5/2017    Bradycardia 8/5/2017    CAD (coronary artery disease)     Constipation, chronic 8/5/2017    Diabetes ([facility]     diet controlled    Diabetes mellitus ([facility] 8/5/2017    Diverticulosis 8/5/2017

## 2023-06-20 NOTE — PROGRESS NOTES
Chief Complaint   Patient presents with    Follow-up       /82 (Site: Left Upper Arm, Position: Sitting, Cuff Size: Small Adult)   Pulse 60   Temp 97.9 °F (36.6 °C) (Oral)   Resp 16   Ht 4' 11\" (1.499 m)   Wt 112 lb 8 oz (51 kg)   SpO2 98%   BMI 22.72 kg/m²     1. \"Have you been to the ER, urgent care clinic since your last visit? Hospitalized since your last visit? \" no    2. \"Have you seen or consulted any other health care providers outside of the [address] [place] [address] since your last visit? \" no     3. For patients aged 39-70: Has the patient had a colonoscopy / FIT/ Cologuard? No      If the patient is female:    4. For patients aged 41-77: Has the patient had a mammogram within the past 2 years?  no

## 2023-07-20 RX ORDER — ATORVASTATIN CALCIUM 20 MG/1
TABLET, FILM COATED ORAL
Qty: 90 TABLET | Refills: 3 | Status: SHIPPED | OUTPATIENT
Start: 2023-07-20

## 2023-07-20 NOTE — TELEPHONE ENCOUNTER
RX refill request from the patient/pharmacy. Patient last seen 06- with labs, and next appt. scheduled for 07-  Requested Prescriptions     Pending Prescriptions Disp Refills    atorvastatin (LIPITOR) 20 MG tablet [Pharmacy Med Name: ATORVASTATIN 20 MG TABLET] 90 tablet 3     Sig: TAKE 1 TABLET BY MOUTH EVERY DAY    .

## 2023-07-25 ENCOUNTER — OFFICE VISIT (OUTPATIENT)
Facility: CLINIC | Age: 83
End: 2023-07-25
Payer: MEDICARE

## 2023-07-25 VITALS
TEMPERATURE: 97.9 F | HEART RATE: 60 BPM | DIASTOLIC BLOOD PRESSURE: 80 MMHG | BODY MASS INDEX: 21.95 KG/M2 | RESPIRATION RATE: 16 BRPM | HEIGHT: 60 IN | SYSTOLIC BLOOD PRESSURE: 128 MMHG | WEIGHT: 111.8 LBS | OXYGEN SATURATION: 97 %

## 2023-07-25 DIAGNOSIS — I42.9 CARDIOMYOPATHY, UNSPECIFIED TYPE (HCC): ICD-10-CM

## 2023-07-25 DIAGNOSIS — I25.10 ASCVD (ARTERIOSCLEROTIC CARDIOVASCULAR DISEASE): ICD-10-CM

## 2023-07-25 DIAGNOSIS — I10 ESSENTIAL HYPERTENSION: Primary | ICD-10-CM

## 2023-07-25 DIAGNOSIS — E11.42 CONTROLLED TYPE 2 DIABETES MELLITUS WITH DIABETIC POLYNEUROPATHY, WITHOUT LONG-TERM CURRENT USE OF INSULIN (HCC): ICD-10-CM

## 2023-07-25 DIAGNOSIS — F41.9 ANXIETY: ICD-10-CM

## 2023-07-25 DIAGNOSIS — M15.9 PRIMARY OSTEOARTHRITIS INVOLVING MULTIPLE JOINTS: ICD-10-CM

## 2023-07-25 DIAGNOSIS — E78.2 MIXED HYPERLIPIDEMIA: ICD-10-CM

## 2023-07-25 PROCEDURE — 99214 OFFICE O/P EST MOD 30 MIN: CPT | Performed by: INTERNAL MEDICINE

## 2023-07-25 PROCEDURE — G8427 DOCREV CUR MEDS BY ELIG CLIN: HCPCS | Performed by: INTERNAL MEDICINE

## 2023-07-25 PROCEDURE — G8420 CALC BMI NORM PARAMETERS: HCPCS | Performed by: INTERNAL MEDICINE

## 2023-07-25 PROCEDURE — 1123F ACP DISCUSS/DSCN MKR DOCD: CPT | Performed by: INTERNAL MEDICINE

## 2023-07-25 PROCEDURE — 3044F HG A1C LEVEL LT 7.0%: CPT | Performed by: INTERNAL MEDICINE

## 2023-07-25 PROCEDURE — 1036F TOBACCO NON-USER: CPT | Performed by: INTERNAL MEDICINE

## 2023-07-25 PROCEDURE — 3079F DIAST BP 80-89 MM HG: CPT | Performed by: INTERNAL MEDICINE

## 2023-07-25 PROCEDURE — 3074F SYST BP LT 130 MM HG: CPT | Performed by: INTERNAL MEDICINE

## 2023-07-25 ASSESSMENT — PATIENT HEALTH QUESTIONNAIRE - PHQ9
2. FEELING DOWN, DEPRESSED OR HOPELESS: 0
SUM OF ALL RESPONSES TO PHQ QUESTIONS 1-9: 0
SUM OF ALL RESPONSES TO PHQ9 QUESTIONS 1 & 2: 0
1. LITTLE INTEREST OR PLEASURE IN DOING THINGS: 0
SUM OF ALL RESPONSES TO PHQ QUESTIONS 1-9: 0

## 2023-07-26 LAB
ALBUMIN SERPL-MCNC: 3.9 G/DL (ref 3.5–5)
ALBUMIN/GLOB SERPL: 1.1 (ref 1.1–2.2)
ALP SERPL-CCNC: 77 U/L (ref 45–117)
ALT SERPL-CCNC: 27 U/L (ref 12–78)
ANION GAP SERPL CALC-SCNC: 3 MMOL/L (ref 5–15)
AST SERPL-CCNC: 26 U/L (ref 15–37)
BILIRUB SERPL-MCNC: 0.7 MG/DL (ref 0.2–1)
BUN SERPL-MCNC: 22 MG/DL (ref 6–20)
BUN/CREAT SERPL: 22 (ref 12–20)
CALCIUM SERPL-MCNC: 9.9 MG/DL (ref 8.5–10.1)
CHLORIDE SERPL-SCNC: 106 MMOL/L (ref 97–108)
CHOLEST SERPL-MCNC: 148 MG/DL
CK SERPL-CCNC: 99 U/L (ref 39–308)
CO2 SERPL-SCNC: 31 MMOL/L (ref 21–32)
CREAT SERPL-MCNC: 0.99 MG/DL (ref 0.7–1.3)
EST. AVERAGE GLUCOSE BLD GHB EST-MCNC: 126 MG/DL
GLOBULIN SER CALC-MCNC: 3.4 G/DL (ref 2–4)
GLUCOSE SERPL-MCNC: 96 MG/DL (ref 65–100)
HBA1C MFR BLD: 6 % (ref 4–5.6)
HDLC SERPL-MCNC: 54 MG/DL
HDLC SERPL: 2.7 (ref 0–5)
LDLC SERPL CALC-MCNC: 82.8 MG/DL (ref 0–100)
POTASSIUM SERPL-SCNC: 4.3 MMOL/L (ref 3.5–5.1)
PROT SERPL-MCNC: 7.3 G/DL (ref 6.4–8.2)
SODIUM SERPL-SCNC: 140 MMOL/L (ref 136–145)
TRIGL SERPL-MCNC: 56 MG/DL
VLDLC SERPL CALC-MCNC: 11.2 MG/DL

## 2023-07-31 RX ORDER — AMLODIPINE BESYLATE 5 MG/1
TABLET ORAL
Qty: 90 TABLET | Refills: 3 | Status: SHIPPED | OUTPATIENT
Start: 2023-07-31

## 2023-07-31 NOTE — TELEPHONE ENCOUNTER
RX refill request from the patient/pharmacy. Patient last seen 07- with labs, and next appt. scheduled for 09-  Requested Prescriptions     Pending Prescriptions Disp Refills    amLODIPine (NORVASC) 5 MG tablet [Pharmacy Med Name: AMLODIPINE BESYLATE 5 MG TAB] 90 tablet 3     Sig: TAKE 1 TABLET BY MOUTH EVERY DAY    .

## 2023-09-11 ENCOUNTER — OFFICE VISIT (OUTPATIENT)
Facility: CLINIC | Age: 83
End: 2023-09-11
Payer: MEDICARE

## 2023-09-11 ENCOUNTER — HOSPITAL ENCOUNTER (OUTPATIENT)
Facility: HOSPITAL | Age: 83
Discharge: HOME | End: 2023-09-14
Attending: INTERNAL MEDICINE
Payer: MEDICARE

## 2023-09-11 VITALS
HEART RATE: 60 BPM | TEMPERATURE: 97.6 F | WEIGHT: 110.7 LBS | DIASTOLIC BLOOD PRESSURE: 82 MMHG | SYSTOLIC BLOOD PRESSURE: 154 MMHG | OXYGEN SATURATION: 98 % | BODY MASS INDEX: 22.36 KG/M2

## 2023-09-11 DIAGNOSIS — E11.42 CONTROLLED TYPE 2 DIABETES MELLITUS WITH DIABETIC POLYNEUROPATHY, WITHOUT LONG-TERM CURRENT USE OF INSULIN (HCC): ICD-10-CM

## 2023-09-11 DIAGNOSIS — I63.9 CEREBROVASCULAR ACCIDENT (CVA), UNSPECIFIED MECHANISM (HCC): ICD-10-CM

## 2023-09-11 DIAGNOSIS — I42.8 NONOBSTRUCTIVE CARDIOMYOPATHY (HCC): ICD-10-CM

## 2023-09-11 DIAGNOSIS — H53.8 BLURRED VISION, BILATERAL: Primary | ICD-10-CM

## 2023-09-11 DIAGNOSIS — I42.9 CARDIOMYOPATHY, UNSPECIFIED TYPE (HCC): ICD-10-CM

## 2023-09-11 DIAGNOSIS — H53.8 BLURRED VISION, BILATERAL: ICD-10-CM

## 2023-09-11 DIAGNOSIS — I10 ESSENTIAL HYPERTENSION: ICD-10-CM

## 2023-09-11 PROCEDURE — 3079F DIAST BP 80-89 MM HG: CPT | Performed by: INTERNAL MEDICINE

## 2023-09-11 PROCEDURE — G8420 CALC BMI NORM PARAMETERS: HCPCS | Performed by: INTERNAL MEDICINE

## 2023-09-11 PROCEDURE — 99214 OFFICE O/P EST MOD 30 MIN: CPT | Performed by: INTERNAL MEDICINE

## 2023-09-11 PROCEDURE — 70450 CT HEAD/BRAIN W/O DYE: CPT

## 2023-09-11 PROCEDURE — 3044F HG A1C LEVEL LT 7.0%: CPT | Performed by: INTERNAL MEDICINE

## 2023-09-11 PROCEDURE — 1123F ACP DISCUSS/DSCN MKR DOCD: CPT | Performed by: INTERNAL MEDICINE

## 2023-09-11 PROCEDURE — 1036F TOBACCO NON-USER: CPT | Performed by: INTERNAL MEDICINE

## 2023-09-11 PROCEDURE — 3077F SYST BP >= 140 MM HG: CPT | Performed by: INTERNAL MEDICINE

## 2023-09-11 PROCEDURE — G8427 DOCREV CUR MEDS BY ELIG CLIN: HCPCS | Performed by: INTERNAL MEDICINE

## 2023-09-11 NOTE — PROGRESS NOTES
Subjective:   Steve Luu is a 79 y.o. male      Chief Complaint   Patient presents with    Headache    Blurred Vision     Same day. ..headaches. .blurry vision        History of present illness: He presents today for an acute visit for an event which occurred 2 days ago. He started 2 days ago with headaches predominantly a frontal headache and on the left side of his head. That headache has persisted. He is also started that same day with blurred vision which affects both eyes equally. He notes no nausea vomiting. There is no ataxia. He notes no arm or leg weakness. There is no speech impediment and he denies any other specific complaints. He does note when he is trying to drive the lines in the curve first to one side tender to the other side. He denies any chest pain, shortness of breath, palpitations, PND, orthopnea or other cardiac or respiratory complaints. He notes no current GI or  complaints. He notes no headaches, dizziness or neurologic complaints other than those noted above. No other complaints on complete review of systems.     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    Chest pain    Epigastric pain    Cardiomyopathy ([address])    Controlled type 2 diabetes mellitus with diabetic polyneuropathy, without long-term current use of insulin (HCC)    Constipation, chronic    Anxiety    Neuropathy    Diverticulosis    Essential hypertension    Neck pain    Mixed hyperlipidemia    Alcohol screening    Bradycardia    Primary osteoarthritis involving multiple joints    ED (erectile dysfunction)    Vitamin D deficiency    Gross hematuria    Edema      Past Medical History:   Diagnosis Date    Anxiety 8/5/2017    Arrhythmia     Arthritis     Atherosclerotic heart disease of native coronary artery without angina pectoris 8/5/2017    Bradycardia 8/5/2017    CAD (coronary artery disease)     Constipation, chronic 8/5/2017    Diabetes ([address])

## 2023-09-11 NOTE — PROGRESS NOTES
Chief Complaint   Patient presents with    Headache    Blurred Vision     Same day. ..headaches. .blurry vision    1. Have you been to the ER, urgent care clinic since your last visit? Hospitalized since your last visit?no    2. Have you seen or consulted any other health care providers outside of the [address] [place] [address] Avenue since your last visit? Include any pap smears or colon screening.  no

## 2023-09-12 LAB
ANION GAP SERPL CALC-SCNC: 6 MMOL/L (ref 5–15)
BASOPHILS # BLD: 0 K/UL (ref 0–0.1)
BASOPHILS NFR BLD: 0 % (ref 0–1)
BUN SERPL-MCNC: 14 MG/DL (ref 6–20)
BUN/CREAT SERPL: 17 (ref 12–20)
CALCIUM SERPL-MCNC: 10 MG/DL (ref 8.5–10.1)
CHLORIDE SERPL-SCNC: 104 MMOL/L (ref 97–108)
CO2 SERPL-SCNC: 29 MMOL/L (ref 21–32)
CREAT SERPL-MCNC: 0.82 MG/DL (ref 0.7–1.3)
DIFFERENTIAL METHOD BLD: ABNORMAL
EOSINOPHIL # BLD: 0.1 K/UL (ref 0–0.4)
EOSINOPHIL NFR BLD: 2 % (ref 0–7)
ERYTHROCYTE [DISTWIDTH] IN BLOOD BY AUTOMATED COUNT: 13.7 % (ref 11.5–14.5)
GLUCOSE SERPL-MCNC: 90 MG/DL (ref 65–100)
HCT VFR BLD AUTO: 47.9 % (ref 36.6–50.3)
HGB BLD-MCNC: 15.4 G/DL (ref 12.1–17)
IMM GRANULOCYTES # BLD AUTO: 0 K/UL (ref 0–0.04)
IMM GRANULOCYTES NFR BLD AUTO: 1 % (ref 0–0.5)
LYMPHOCYTES # BLD: 2.7 K/UL (ref 0.8–3.5)
LYMPHOCYTES NFR BLD: 33 % (ref 12–49)
MCH RBC QN AUTO: 32.9 PG (ref 26–34)
MCHC RBC AUTO-ENTMCNC: 32.2 G/DL (ref 30–36.5)
MCV RBC AUTO: 102.4 FL (ref 80–99)
MONOCYTES # BLD: 0.9 K/UL (ref 0–1)
MONOCYTES NFR BLD: 11 % (ref 5–13)
NEUTS SEG # BLD: 4.5 K/UL (ref 1.8–8)
NEUTS SEG NFR BLD: 53 % (ref 32–75)
NRBC # BLD: 0 K/UL (ref 0–0.01)
NRBC BLD-RTO: 0 PER 100 WBC
PLATELET # BLD AUTO: 231 K/UL (ref 150–400)
PMV BLD AUTO: 10.2 FL (ref 8.9–12.9)
POTASSIUM SERPL-SCNC: 4.3 MMOL/L (ref 3.5–5.1)
RBC # BLD AUTO: 4.68 M/UL (ref 4.1–5.7)
SODIUM SERPL-SCNC: 139 MMOL/L (ref 136–145)
WBC # BLD AUTO: 8.3 K/UL (ref 4.1–11.1)

## 2023-09-24 PROBLEM — Z00.00 MEDICARE ANNUAL WELLNESS VISIT, SUBSEQUENT: Status: ACTIVE | Noted: 2017-08-07

## 2023-09-24 PROBLEM — Z12.5 PROSTATE CANCER SCREENING: Status: ACTIVE | Noted: 2021-09-14

## 2023-09-24 PROBLEM — R07.9 CHEST PAIN: Status: RESOLVED | Noted: 2020-02-17 | Resolved: 2023-09-24

## 2023-09-26 ENCOUNTER — OFFICE VISIT (OUTPATIENT)
Facility: CLINIC | Age: 83
End: 2023-09-26
Payer: MEDICARE

## 2023-09-26 VITALS
TEMPERATURE: 98 F | DIASTOLIC BLOOD PRESSURE: 76 MMHG | WEIGHT: 113 LBS | HEIGHT: 60 IN | OXYGEN SATURATION: 98 % | RESPIRATION RATE: 18 BRPM | SYSTOLIC BLOOD PRESSURE: 118 MMHG | HEART RATE: 60 BPM | BODY MASS INDEX: 22.19 KG/M2

## 2023-09-26 DIAGNOSIS — I42.9 CARDIOMYOPATHY, UNSPECIFIED TYPE (HCC): ICD-10-CM

## 2023-09-26 DIAGNOSIS — I10 ESSENTIAL HYPERTENSION: Primary | ICD-10-CM

## 2023-09-26 DIAGNOSIS — K57.90 DIVERTICULOSIS: ICD-10-CM

## 2023-09-26 DIAGNOSIS — R00.1 BRADYCARDIA: ICD-10-CM

## 2023-09-26 DIAGNOSIS — E55.9 VITAMIN D DEFICIENCY: ICD-10-CM

## 2023-09-26 DIAGNOSIS — Z12.5 PROSTATE CANCER SCREENING: ICD-10-CM

## 2023-09-26 DIAGNOSIS — E11.42 CONTROLLED TYPE 2 DIABETES MELLITUS WITH DIABETIC POLYNEUROPATHY, WITHOUT LONG-TERM CURRENT USE OF INSULIN (HCC): ICD-10-CM

## 2023-09-26 DIAGNOSIS — Z13.39 ALCOHOL SCREENING: ICD-10-CM

## 2023-09-26 DIAGNOSIS — G62.9 NEUROPATHY: ICD-10-CM

## 2023-09-26 DIAGNOSIS — E78.2 MIXED HYPERLIPIDEMIA: ICD-10-CM

## 2023-09-26 DIAGNOSIS — Z00.00 MEDICARE ANNUAL WELLNESS VISIT, SUBSEQUENT: ICD-10-CM

## 2023-09-26 DIAGNOSIS — F41.9 ANXIETY: ICD-10-CM

## 2023-09-26 DIAGNOSIS — I25.10 ASCVD (ARTERIOSCLEROTIC CARDIOVASCULAR DISEASE): ICD-10-CM

## 2023-09-26 DIAGNOSIS — M15.9 PRIMARY OSTEOARTHRITIS INVOLVING MULTIPLE JOINTS: ICD-10-CM

## 2023-09-26 PROCEDURE — G0439 PPPS, SUBSEQ VISIT: HCPCS | Performed by: INTERNAL MEDICINE

## 2023-09-26 PROCEDURE — 3044F HG A1C LEVEL LT 7.0%: CPT | Performed by: INTERNAL MEDICINE

## 2023-09-26 PROCEDURE — 1036F TOBACCO NON-USER: CPT | Performed by: INTERNAL MEDICINE

## 2023-09-26 PROCEDURE — 3078F DIAST BP <80 MM HG: CPT | Performed by: INTERNAL MEDICINE

## 2023-09-26 PROCEDURE — 1123F ACP DISCUSS/DSCN MKR DOCD: CPT | Performed by: INTERNAL MEDICINE

## 2023-09-26 PROCEDURE — G0008 ADMIN INFLUENZA VIRUS VAC: HCPCS | Performed by: INTERNAL MEDICINE

## 2023-09-26 PROCEDURE — 93000 ELECTROCARDIOGRAM COMPLETE: CPT | Performed by: INTERNAL MEDICINE

## 2023-09-26 PROCEDURE — 99214 OFFICE O/P EST MOD 30 MIN: CPT | Performed by: INTERNAL MEDICINE

## 2023-09-26 PROCEDURE — G8420 CALC BMI NORM PARAMETERS: HCPCS | Performed by: INTERNAL MEDICINE

## 2023-09-26 PROCEDURE — 3074F SYST BP LT 130 MM HG: CPT | Performed by: INTERNAL MEDICINE

## 2023-09-26 PROCEDURE — 90694 VACC AIIV4 NO PRSRV 0.5ML IM: CPT | Performed by: INTERNAL MEDICINE

## 2023-09-26 PROCEDURE — G0442 ANNUAL ALCOHOL SCREEN 15 MIN: HCPCS | Performed by: INTERNAL MEDICINE

## 2023-09-26 PROCEDURE — G8427 DOCREV CUR MEDS BY ELIG CLIN: HCPCS | Performed by: INTERNAL MEDICINE

## 2023-09-26 RX ORDER — PREDNISONE 20 MG/1
TABLET ORAL
COMMUNITY
Start: 2023-09-12

## 2023-09-26 SDOH — ECONOMIC STABILITY: HOUSING INSECURITY
IN THE LAST 12 MONTHS, WAS THERE A TIME WHEN YOU DID NOT HAVE A STEADY PLACE TO SLEEP OR SLEPT IN A SHELTER (INCLUDING NOW)?: NO

## 2023-09-26 SDOH — ECONOMIC STABILITY: FOOD INSECURITY: WITHIN THE PAST 12 MONTHS, THE FOOD YOU BOUGHT JUST DIDN'T LAST AND YOU DIDN'T HAVE MONEY TO GET MORE.: NEVER TRUE

## 2023-09-26 SDOH — ECONOMIC STABILITY: FOOD INSECURITY: WITHIN THE PAST 12 MONTHS, YOU WORRIED THAT YOUR FOOD WOULD RUN OUT BEFORE YOU GOT MONEY TO BUY MORE.: NEVER TRUE

## 2023-09-26 SDOH — ECONOMIC STABILITY: INCOME INSECURITY: HOW HARD IS IT FOR YOU TO PAY FOR THE VERY BASICS LIKE FOOD, HOUSING, MEDICAL CARE, AND HEATING?: NOT HARD AT ALL

## 2023-09-26 ASSESSMENT — PATIENT HEALTH QUESTIONNAIRE - PHQ9
SUM OF ALL RESPONSES TO PHQ9 QUESTIONS 1 & 2: 0
SUM OF ALL RESPONSES TO PHQ QUESTIONS 1-9: 0
SUM OF ALL RESPONSES TO PHQ QUESTIONS 1-9: 0
2. FEELING DOWN, DEPRESSED OR HOPELESS: 0
SUM OF ALL RESPONSES TO PHQ QUESTIONS 1-9: 0
SUM OF ALL RESPONSES TO PHQ QUESTIONS 1-9: 0
1. LITTLE INTEREST OR PLEASURE IN DOING THINGS: 0

## 2023-09-26 ASSESSMENT — LIFESTYLE VARIABLES
HOW OFTEN DO YOU HAVE A DRINK CONTAINING ALCOHOL: NEVER
HOW MANY STANDARD DRINKS CONTAINING ALCOHOL DO YOU HAVE ON A TYPICAL DAY: PATIENT DOES NOT DRINK

## 2023-09-27 LAB
25(OH)D3 SERPL-MCNC: 16 NG/ML (ref 30–100)
ALBUMIN SERPL-MCNC: 3.6 G/DL (ref 3.5–5)
ALBUMIN/GLOB SERPL: 1.3 (ref 1.1–2.2)
ALP SERPL-CCNC: 85 U/L (ref 45–117)
ALT SERPL-CCNC: 32 U/L (ref 12–78)
ANION GAP SERPL CALC-SCNC: 6 MMOL/L (ref 5–15)
APPEARANCE UR: CLEAR
AST SERPL-CCNC: 15 U/L (ref 15–37)
BACTERIA URNS QL MICRO: ABNORMAL /HPF
BASOPHILS # BLD: 0.1 K/UL (ref 0–0.1)
BASOPHILS NFR BLD: 0 % (ref 0–1)
BILIRUB SERPL-MCNC: 0.7 MG/DL (ref 0.2–1)
BILIRUB UR QL: NEGATIVE
BUN SERPL-MCNC: 22 MG/DL (ref 6–20)
BUN/CREAT SERPL: 23 (ref 12–20)
CALCIUM SERPL-MCNC: 10.1 MG/DL (ref 8.5–10.1)
CAOX CRY URNS QL MICRO: ABNORMAL
CHLORIDE SERPL-SCNC: 102 MMOL/L (ref 97–108)
CHOLEST SERPL-MCNC: 221 MG/DL
CK SERPL-CCNC: 46 U/L (ref 39–308)
CO2 SERPL-SCNC: 33 MMOL/L (ref 21–32)
COLOR UR: ABNORMAL
CREAT SERPL-MCNC: 0.97 MG/DL (ref 0.7–1.3)
CREAT UR-MCNC: 109 MG/DL
DIFFERENTIAL METHOD BLD: ABNORMAL
EOSINOPHIL # BLD: 0.1 K/UL (ref 0–0.4)
EOSINOPHIL NFR BLD: 0 % (ref 0–7)
EPITH CASTS URNS QL MICRO: ABNORMAL /LPF
ERYTHROCYTE [DISTWIDTH] IN BLOOD BY AUTOMATED COUNT: 14.3 % (ref 11.5–14.5)
EST. AVERAGE GLUCOSE BLD GHB EST-MCNC: 140 MG/DL
GLOBULIN SER CALC-MCNC: 2.8 G/DL (ref 2–4)
GLUCOSE SERPL-MCNC: 150 MG/DL (ref 65–100)
GLUCOSE UR STRIP.AUTO-MCNC: 100 MG/DL
HBA1C MFR BLD: 6.5 % (ref 4–5.6)
HCT VFR BLD AUTO: 50.4 % (ref 36.6–50.3)
HDLC SERPL-MCNC: 77 MG/DL
HDLC SERPL: 2.9 (ref 0–5)
HGB BLD-MCNC: 16.5 G/DL (ref 12.1–17)
HGB UR QL STRIP: ABNORMAL
HYALINE CASTS URNS QL MICRO: ABNORMAL /LPF (ref 0–5)
IMM GRANULOCYTES # BLD AUTO: 0.2 K/UL (ref 0–0.04)
IMM GRANULOCYTES NFR BLD AUTO: 1 % (ref 0–0.5)
KETONES UR QL STRIP.AUTO: NEGATIVE MG/DL
LDLC SERPL CALC-MCNC: 127.6 MG/DL (ref 0–100)
LEUKOCYTE ESTERASE UR QL STRIP.AUTO: ABNORMAL
LYMPHOCYTES # BLD: 3.4 K/UL (ref 0.8–3.5)
LYMPHOCYTES NFR BLD: 25 % (ref 12–49)
MCH RBC QN AUTO: 32.7 PG (ref 26–34)
MCHC RBC AUTO-ENTMCNC: 32.7 G/DL (ref 30–36.5)
MCV RBC AUTO: 99.8 FL (ref 80–99)
MICROALBUMIN UR-MCNC: 9.12 MG/DL
MICROALBUMIN/CREAT UR-RTO: 84 MG/G (ref 0–30)
MONOCYTES # BLD: 1.2 K/UL (ref 0–1)
MONOCYTES NFR BLD: 9 % (ref 5–13)
NEUTS SEG # BLD: 8.8 K/UL (ref 1.8–8)
NEUTS SEG NFR BLD: 65 % (ref 32–75)
NITRITE UR QL STRIP.AUTO: NEGATIVE
NRBC # BLD: 0.02 K/UL (ref 0–0.01)
NRBC BLD-RTO: 0.1 PER 100 WBC
PH UR STRIP: 6.5 (ref 5–8)
PLATELET # BLD AUTO: 199 K/UL (ref 150–400)
PMV BLD AUTO: 10.1 FL (ref 8.9–12.9)
POTASSIUM SERPL-SCNC: 4.3 MMOL/L (ref 3.5–5.1)
PROT SERPL-MCNC: 6.4 G/DL (ref 6.4–8.2)
PROT UR STRIP-MCNC: ABNORMAL MG/DL
PSA SERPL-MCNC: 1.3 NG/ML (ref 0.01–4)
RBC # BLD AUTO: 5.05 M/UL (ref 4.1–5.7)
RBC #/AREA URNS HPF: ABNORMAL /HPF (ref 0–5)
SODIUM SERPL-SCNC: 141 MMOL/L (ref 136–145)
SP GR UR REFRACTOMETRY: 1.02 (ref 1–1.03)
T4 FREE SERPL-MCNC: 1.2 NG/DL (ref 0.8–1.5)
TRIGL SERPL-MCNC: 82 MG/DL
TSH SERPL DL<=0.05 MIU/L-ACNC: 2.46 UIU/ML (ref 0.36–3.74)
UROBILINOGEN UR QL STRIP.AUTO: 1 EU/DL (ref 0.2–1)
VLDLC SERPL CALC-MCNC: 16.4 MG/DL
WBC # BLD AUTO: 13.7 K/UL (ref 4.1–11.1)
WBC URNS QL MICRO: ABNORMAL /HPF (ref 0–4)

## 2023-09-29 ENCOUNTER — TELEPHONE (OUTPATIENT)
Facility: CLINIC | Age: 83
End: 2023-09-29

## 2023-09-29 RX ORDER — ATORVASTATIN CALCIUM 40 MG/1
40 TABLET, FILM COATED ORAL DAILY
Qty: 90 TABLET | Refills: 3 | Status: SHIPPED | OUTPATIENT
Start: 2023-09-29

## 2023-09-29 NOTE — TELEPHONE ENCOUNTER
RX refill request from the patient/pharmacy. Patient last seen 09- with labs, and next appt. scheduled for 10-  Requested Prescriptions     Pending Prescriptions Disp Refills    atorvastatin (LIPITOR) 40 MG tablet 90 tablet 3     Sig: Take 1 tablet by mouth daily    .

## 2023-09-29 NOTE — TELEPHONE ENCOUNTER
Informed patient that his blood sugar and glyco were elevated due to his recent taking of steroids. Patient states he just tapered off of them and finished yesterday. Also his Vitamin D level is low. Need to start Vitamin D 1000 units daily. Cholesterol and LDL elevated. Need to increase his Lipitor to 40 mg daily. Rx sent to Dr. Cody Rowland to review and sign.

## 2023-10-24 PROBLEM — Z12.5 PROSTATE CANCER SCREENING: Status: RESOLVED | Noted: 2021-09-14 | Resolved: 2023-10-24

## 2023-10-24 PROBLEM — Z00.00 MEDICARE ANNUAL WELLNESS VISIT, SUBSEQUENT: Status: RESOLVED | Noted: 2017-08-07 | Resolved: 2023-10-24

## 2023-11-03 NOTE — TELEPHONE ENCOUNTER
Requested Prescriptions     Pending Prescriptions Disp Refills    metFORMIN (GLUCOPHAGE) 500 MG tablet [Pharmacy Med Name: METFORMIN  MG TABLET] 180 tablet 3     Sig: TAKE 1 TABLET BY MOUTH TWICE A DAY WITH MEALS       RX refill request from the patient/pharmacy. Patient last seen 9/26/23 with labs, and next appt. scheduled for 19/24.

## 2024-01-08 NOTE — PROGRESS NOTES
Chief Complaint   Patient presents with    Hypertension     3 month        SUBJECTIVE:    Leonides Appiah is a 83 y.o. male who returns in follow-up for his medical problems include hypertension, diabetes, hyperlipidemia, cardiomyopathy, ASCVD and him medical problems.  He is taking his medication trying to follow his diet remains physically active.  Currently he denies any chest pain, shortness of breath or cardiac respiratory complaints.  There are no GI or  complaints.  There are no headaches, dizziness or neurologic complaints.  He has no current active arthritic complaints and there are no other complaints on complete review of systems.      Current Outpatient Medications   Medication Sig Dispense Refill    metFORMIN (GLUCOPHAGE) 500 MG tablet TAKE 1 TABLET BY MOUTH TWICE A DAY WITH MEALS (Patient taking differently: Take 1 tablet by mouth daily (with breakfast)) 180 tablet 3    atorvastatin (LIPITOR) 40 MG tablet Take 1 tablet by mouth daily 90 tablet 3    amLODIPine (NORVASC) 5 MG tablet TAKE 1 TABLET BY MOUTH EVERY DAY 90 tablet 3    metoprolol succinate (TOPROL XL) 25 MG extended release tablet Take 1 tablet by mouth daily      omeprazole (PRILOSEC) 20 MG delayed release capsule Take 1 capsule by mouth daily      polyethylene glycol (GLYCOLAX) 17 GM/SCOOP powder MIX 17G IN WATER AND DRINK DAILY      ramipril (ALTACE) 10 MG capsule Take 2 tablets by mouth daily      SODIUM FLUORIDE, DENTAL GEL, 1.1 % GEL BRUSH WITH A PEA SIZED AMOUNT FOR 2 MINUTES BEFORE BEDTIME. DO NOT RINSE/DRINK/EAT AFTER BRUSHING.       No current facility-administered medications for this visit.     Past Medical History:   Diagnosis Date    Anxiety 8/5/2017    Arrhythmia     Arthritis     Atherosclerotic heart disease of native coronary artery without angina pectoris 8/5/2017    Bradycardia 8/5/2017    CAD (coronary artery disease)     Constipation, chronic 8/5/2017    Diabetes (HCC)     diet controlled    Diabetes mellitus (HCC)

## 2024-01-09 ENCOUNTER — OFFICE VISIT (OUTPATIENT)
Facility: CLINIC | Age: 84
End: 2024-01-09
Payer: MEDICARE

## 2024-01-09 VITALS
DIASTOLIC BLOOD PRESSURE: 74 MMHG | HEART RATE: 60 BPM | OXYGEN SATURATION: 97 % | WEIGHT: 113.7 LBS | SYSTOLIC BLOOD PRESSURE: 116 MMHG | BODY MASS INDEX: 22.32 KG/M2 | HEIGHT: 60 IN | RESPIRATION RATE: 18 BRPM | TEMPERATURE: 97.4 F

## 2024-01-09 DIAGNOSIS — I42.9 CARDIOMYOPATHY, UNSPECIFIED TYPE (HCC): ICD-10-CM

## 2024-01-09 DIAGNOSIS — M15.9 PRIMARY OSTEOARTHRITIS INVOLVING MULTIPLE JOINTS: ICD-10-CM

## 2024-01-09 DIAGNOSIS — I10 ESSENTIAL HYPERTENSION: Primary | ICD-10-CM

## 2024-01-09 DIAGNOSIS — G62.9 NEUROPATHY: ICD-10-CM

## 2024-01-09 DIAGNOSIS — E11.42 CONTROLLED TYPE 2 DIABETES MELLITUS WITH DIABETIC POLYNEUROPATHY, WITHOUT LONG-TERM CURRENT USE OF INSULIN (HCC): ICD-10-CM

## 2024-01-09 DIAGNOSIS — E78.2 MIXED HYPERLIPIDEMIA: ICD-10-CM

## 2024-01-09 DIAGNOSIS — I25.10 ASCVD (ARTERIOSCLEROTIC CARDIOVASCULAR DISEASE): ICD-10-CM

## 2024-01-09 LAB
ALBUMIN SERPL-MCNC: 3.9 G/DL (ref 3.5–5)
ALBUMIN/GLOB SERPL: 1.2 (ref 1.1–2.2)
ALP SERPL-CCNC: 79 U/L (ref 45–117)
ALT SERPL-CCNC: 23 U/L (ref 12–78)
ANION GAP SERPL CALC-SCNC: 5 MMOL/L (ref 5–15)
AST SERPL-CCNC: 18 U/L (ref 15–37)
BILIRUB SERPL-MCNC: 0.9 MG/DL (ref 0.2–1)
BUN SERPL-MCNC: 16 MG/DL (ref 6–20)
BUN/CREAT SERPL: 20 (ref 12–20)
CALCIUM SERPL-MCNC: 9.8 MG/DL (ref 8.5–10.1)
CHLORIDE SERPL-SCNC: 106 MMOL/L (ref 97–108)
CHOLEST SERPL-MCNC: 179 MG/DL
CK SERPL-CCNC: 95 U/L (ref 39–308)
CO2 SERPL-SCNC: 30 MMOL/L (ref 21–32)
CREAT SERPL-MCNC: 0.81 MG/DL (ref 0.7–1.3)
EST. AVERAGE GLUCOSE BLD GHB EST-MCNC: 131 MG/DL
GLOBULIN SER CALC-MCNC: 3.3 G/DL (ref 2–4)
GLUCOSE SERPL-MCNC: 81 MG/DL (ref 65–100)
HBA1C MFR BLD: 6.2 % (ref 4–5.6)
HDLC SERPL-MCNC: 60 MG/DL
HDLC SERPL: 3 (ref 0–5)
LDLC SERPL CALC-MCNC: 105.4 MG/DL (ref 0–100)
POTASSIUM SERPL-SCNC: 4.5 MMOL/L (ref 3.5–5.1)
PROT SERPL-MCNC: 7.2 G/DL (ref 6.4–8.2)
SODIUM SERPL-SCNC: 141 MMOL/L (ref 136–145)
TRIGL SERPL-MCNC: 68 MG/DL
VLDLC SERPL CALC-MCNC: 13.6 MG/DL

## 2024-01-09 PROCEDURE — 1036F TOBACCO NON-USER: CPT | Performed by: INTERNAL MEDICINE

## 2024-01-09 PROCEDURE — 1123F ACP DISCUSS/DSCN MKR DOCD: CPT | Performed by: INTERNAL MEDICINE

## 2024-01-09 PROCEDURE — 3074F SYST BP LT 130 MM HG: CPT | Performed by: INTERNAL MEDICINE

## 2024-01-09 PROCEDURE — 3078F DIAST BP <80 MM HG: CPT | Performed by: INTERNAL MEDICINE

## 2024-01-09 PROCEDURE — G8427 DOCREV CUR MEDS BY ELIG CLIN: HCPCS | Performed by: INTERNAL MEDICINE

## 2024-01-09 PROCEDURE — G8420 CALC BMI NORM PARAMETERS: HCPCS | Performed by: INTERNAL MEDICINE

## 2024-01-09 PROCEDURE — G8484 FLU IMMUNIZE NO ADMIN: HCPCS | Performed by: INTERNAL MEDICINE

## 2024-01-09 PROCEDURE — 99214 OFFICE O/P EST MOD 30 MIN: CPT | Performed by: INTERNAL MEDICINE

## 2024-01-09 ASSESSMENT — PATIENT HEALTH QUESTIONNAIRE - PHQ9
SUM OF ALL RESPONSES TO PHQ QUESTIONS 1-9: 0
2. FEELING DOWN, DEPRESSED OR HOPELESS: 0
SUM OF ALL RESPONSES TO PHQ QUESTIONS 1-9: 0
1. LITTLE INTEREST OR PLEASURE IN DOING THINGS: 0
SUM OF ALL RESPONSES TO PHQ9 QUESTIONS 1 & 2: 0
SUM OF ALL RESPONSES TO PHQ QUESTIONS 1-9: 0
SUM OF ALL RESPONSES TO PHQ QUESTIONS 1-9: 0

## 2024-01-09 NOTE — PROGRESS NOTES
Chief Complaint   Patient presents with    Hypertension     3 month        1. Have you been to the ER, urgent care clinic since your last visit?  Hospitalized since your last visit?No    2. Have you seen or consulted any other health care providers outside of the [facility] since your last visit?  Include any pap smears or colon screening. No    BP (!) 155/77 (Site: Left Upper Arm, Position: Sitting)   Pulse 60   Temp 97.4 °F (36.3 °C) (Oral)   Resp 18   Ht 1.499 m (4' 11\")   Wt 51.6 kg (113 lb 11.2 oz)   SpO2 97%   BMI 22.96 kg/m²

## 2024-04-15 NOTE — PROGRESS NOTES
Chief Complaint   Patient presents with    3 Month Follow-Up       SUBJECTIVE:    Leonides Appiah is a 83 y.o. male who returns in follow-up for his medical problems include hypertension, hyperlipidemia, diabetes, DJD, cardiomyopathy, ASCVD and other medical problems.  He is noting some low back pain is to the point where he would like to get an x-ray of his back and see what is going on there.  It does not radiate to either of his legs.  He notes no numbness or paresthesias in his legs.  He notes no chest pain, shortness of breath or cardiorespiratory complaints.  There are no GI or  complaints and he notes no other complaints.      Current Outpatient Medications   Medication Sig Dispense Refill    metFORMIN (GLUCOPHAGE) 500 MG tablet TAKE 1 TABLET BY MOUTH TWICE A DAY WITH MEALS (Patient taking differently: Take 1 tablet by mouth daily (with breakfast)) 180 tablet 3    atorvastatin (LIPITOR) 40 MG tablet Take 1 tablet by mouth daily 90 tablet 3    amLODIPine (NORVASC) 5 MG tablet TAKE 1 TABLET BY MOUTH EVERY DAY 90 tablet 3    metoprolol succinate (TOPROL XL) 25 MG extended release tablet Take 1 tablet by mouth daily      omeprazole (PRILOSEC) 20 MG delayed release capsule Take 1 capsule by mouth daily      polyethylene glycol (GLYCOLAX) 17 GM/SCOOP powder MIX 17G IN WATER AND DRINK DAILY      ramipril (ALTACE) 10 MG capsule Take 2 tablets by mouth daily      SODIUM FLUORIDE, DENTAL GEL, 1.1 % GEL BRUSH WITH A PEA SIZED AMOUNT FOR 2 MINUTES BEFORE BEDTIME. DO NOT RINSE/DRINK/EAT AFTER BRUSHING.       No current facility-administered medications for this visit.     Past Medical History:   Diagnosis Date    Anxiety 8/5/2017    Arrhythmia     Arthritis     Atherosclerotic heart disease of native coronary artery without angina pectoris 8/5/2017    Bradycardia 8/5/2017    CAD (coronary artery disease)     Constipation, chronic 8/5/2017    Diabetes (HCC)     diet controlled    Diabetes mellitus (HCC) 8/5/2017

## 2024-04-16 ENCOUNTER — OFFICE VISIT (OUTPATIENT)
Facility: CLINIC | Age: 84
End: 2024-04-16
Payer: MEDICARE

## 2024-04-16 VITALS
TEMPERATURE: 97.3 F | RESPIRATION RATE: 16 BRPM | OXYGEN SATURATION: 98 % | SYSTOLIC BLOOD PRESSURE: 110 MMHG | HEART RATE: 60 BPM | DIASTOLIC BLOOD PRESSURE: 80 MMHG | HEIGHT: 60 IN | BODY MASS INDEX: 22.48 KG/M2 | WEIGHT: 114.5 LBS

## 2024-04-16 DIAGNOSIS — G89.29 CHRONIC MIDLINE LOW BACK PAIN WITHOUT SCIATICA: ICD-10-CM

## 2024-04-16 DIAGNOSIS — M54.50 CHRONIC MIDLINE LOW BACK PAIN WITHOUT SCIATICA: ICD-10-CM

## 2024-04-16 DIAGNOSIS — M15.9 PRIMARY OSTEOARTHRITIS INVOLVING MULTIPLE JOINTS: ICD-10-CM

## 2024-04-16 DIAGNOSIS — I25.10 ASCVD (ARTERIOSCLEROTIC CARDIOVASCULAR DISEASE): ICD-10-CM

## 2024-04-16 DIAGNOSIS — I10 ESSENTIAL HYPERTENSION: Primary | ICD-10-CM

## 2024-04-16 DIAGNOSIS — I42.9 CARDIOMYOPATHY, UNSPECIFIED TYPE (HCC): ICD-10-CM

## 2024-04-16 DIAGNOSIS — E78.2 MIXED HYPERLIPIDEMIA: ICD-10-CM

## 2024-04-16 DIAGNOSIS — E11.42 CONTROLLED TYPE 2 DIABETES MELLITUS WITH DIABETIC POLYNEUROPATHY, WITHOUT LONG-TERM CURRENT USE OF INSULIN (HCC): ICD-10-CM

## 2024-04-16 LAB
ALBUMIN SERPL-MCNC: 3.9 G/DL (ref 3.5–5)
ALBUMIN/GLOB SERPL: 1.1 (ref 1.1–2.2)
ALP SERPL-CCNC: 100 U/L (ref 45–117)
ALT SERPL-CCNC: 32 U/L (ref 12–78)
ANION GAP SERPL CALC-SCNC: 3 MMOL/L (ref 5–15)
AST SERPL-CCNC: 27 U/L (ref 15–37)
BILIRUB SERPL-MCNC: 1 MG/DL (ref 0.2–1)
BUN SERPL-MCNC: 21 MG/DL (ref 6–20)
BUN/CREAT SERPL: 24 (ref 12–20)
CALCIUM SERPL-MCNC: 10.3 MG/DL (ref 8.5–10.1)
CHLORIDE SERPL-SCNC: 105 MMOL/L (ref 97–108)
CHOLEST SERPL-MCNC: 142 MG/DL
CK SERPL-CCNC: 100 U/L (ref 39–308)
CO2 SERPL-SCNC: 32 MMOL/L (ref 21–32)
CREAT SERPL-MCNC: 0.88 MG/DL (ref 0.7–1.3)
EST. AVERAGE GLUCOSE BLD GHB EST-MCNC: 126 MG/DL
GLOBULIN SER CALC-MCNC: 3.6 G/DL (ref 2–4)
GLUCOSE SERPL-MCNC: 96 MG/DL (ref 65–100)
HBA1C MFR BLD: 6 % (ref 4–5.6)
HDLC SERPL-MCNC: 60 MG/DL
HDLC SERPL: 2.4 (ref 0–5)
LDLC SERPL CALC-MCNC: 67.8 MG/DL (ref 0–100)
POTASSIUM SERPL-SCNC: 4.5 MMOL/L (ref 3.5–5.1)
PROT SERPL-MCNC: 7.5 G/DL (ref 6.4–8.2)
SODIUM SERPL-SCNC: 140 MMOL/L (ref 136–145)
TRIGL SERPL-MCNC: 71 MG/DL
VLDLC SERPL CALC-MCNC: 14.2 MG/DL

## 2024-04-16 PROCEDURE — 1036F TOBACCO NON-USER: CPT | Performed by: INTERNAL MEDICINE

## 2024-04-16 PROCEDURE — 3044F HG A1C LEVEL LT 7.0%: CPT | Performed by: INTERNAL MEDICINE

## 2024-04-16 PROCEDURE — 3079F DIAST BP 80-89 MM HG: CPT | Performed by: INTERNAL MEDICINE

## 2024-04-16 PROCEDURE — G8420 CALC BMI NORM PARAMETERS: HCPCS | Performed by: INTERNAL MEDICINE

## 2024-04-16 PROCEDURE — G8427 DOCREV CUR MEDS BY ELIG CLIN: HCPCS | Performed by: INTERNAL MEDICINE

## 2024-04-16 PROCEDURE — 99214 OFFICE O/P EST MOD 30 MIN: CPT | Performed by: INTERNAL MEDICINE

## 2024-04-16 PROCEDURE — 1123F ACP DISCUSS/DSCN MKR DOCD: CPT | Performed by: INTERNAL MEDICINE

## 2024-04-16 PROCEDURE — 3074F SYST BP LT 130 MM HG: CPT | Performed by: INTERNAL MEDICINE

## 2024-04-16 NOTE — PROGRESS NOTES
Leonides Appiah is a 83 y.o. male     Chief Complaint   Patient presents with    3 Month Follow-Up       /80 (Site: Left Upper Arm, Position: Sitting, Cuff Size: Large Adult)   Pulse 60   Temp 97.3 °F (36.3 °C) (Oral)   Resp 16   Ht 1.499 m (4' 11\")   Wt 51.9 kg (114 lb 8 oz)   SpO2 98%   BMI 23.13 kg/m²     Health Maintenance Due   Topic Date Due    DTaP/Tdap/Td vaccine (1 - Tdap) Never done    Respiratory Syncytial Virus (RSV) Pregnant or age 60 yrs+ (1 - 1-dose 60+ series) Never done    Pneumococcal 65+ years Vaccine (2 of 2 - PPSV23 or PCV20) 11/09/2015    COVID-19 Vaccine (6 - 2023-24 season) 11/29/2023         \"Have you been to the ER, urgent care clinic since your last visit?  Hospitalized since your last visit?\"    NO    “Have you seen or consulted any other health care providers outside of [facility] since your last visit?”    NO

## 2024-04-30 RX ORDER — BUMETANIDE 0.5 MG/1
0.5 TABLET ORAL DAILY
Qty: 90 TABLET | Refills: 3 | Status: SHIPPED | OUTPATIENT
Start: 2024-04-30

## 2024-04-30 NOTE — TELEPHONE ENCOUNTER
RX refill request from the patient/pharmacy. Patient last seen 04- with labs, and next appt. scheduled for 07-  Requested Prescriptions     Pending Prescriptions Disp Refills    bumetanide (BUMEX) 0.5 MG tablet [Pharmacy Med Name: BUMETANIDE 0.5 MG TABLET] 90 tablet 3     Sig: TAKE 1 TABLET BY MOUTH EVERY DAY    .

## 2024-05-01 DIAGNOSIS — I10 ESSENTIAL (PRIMARY) HYPERTENSION: ICD-10-CM

## 2024-05-01 RX ORDER — RAMIPRIL 10 MG/1
20 CAPSULE ORAL DAILY
Qty: 180 CAPSULE | Refills: 3 | Status: SHIPPED | OUTPATIENT
Start: 2024-05-01

## 2024-05-01 NOTE — TELEPHONE ENCOUNTER
PCP: Fredi Santizo MD    Last appt: 4/16/2024  Future Appointments   Date Time Provider Department Center   7/16/2024  9:10 AM Fredi Santizo MD [facility]       Requested Prescriptions     Pending Prescriptions Disp Refills    ramipril (ALTACE) 10 MG capsule [Pharmacy Med Name: RAMIPRIL 10 MG CAPSULE] 180 capsule 3     Sig: TAKE 2 CAPSULES BY MOUTH EVERY DAY       Prior labs and Blood pressures:  BP Readings from Last 3 Encounters:   04/16/24 110/80   01/09/24 116/74   09/26/23 118/76     Lab Results   Component Value Date/Time     04/16/2024 10:15 AM    K 4.5 04/16/2024 10:15 AM     04/16/2024 10:15 AM    CO2 32 04/16/2024 10:15 AM    BUN 21 04/16/2024 10:15 AM    GFRAA >60 09/27/2022 11:11 AM     No results found for: \"HBA1C\", \"PGF0LGFQ\"  Lab Results   Component Value Date/Time    CHOL 142 04/16/2024 10:15 AM    HDL 60 04/16/2024 10:15 AM    LDL 67.8 04/16/2024 10:15 AM    VLDL 17 11/20/2020 10:48 AM     No results found for: \"VITD3\", \"VD3RIA\"        Lab Results   Component Value Date/Time    TSH 1.41 11/02/2022 02:06 PM

## 2024-05-02 RX ORDER — METOPROLOL SUCCINATE 25 MG/1
25 TABLET, EXTENDED RELEASE ORAL DAILY
Qty: 30 TABLET | Refills: 5 | Status: SHIPPED | OUTPATIENT
Start: 2024-05-02

## 2024-05-02 NOTE — TELEPHONE ENCOUNTER
RX refill request from the patient/pharmacy. Patient last seen 04- with labs, and next appt. scheduled for 7-  Requested Prescriptions     Pending Prescriptions Disp Refills    metoprolol succinate (TOPROL XL) 25 MG extended release tablet 30 tablet 5     Sig: Take 1 tablet by mouth daily    .

## 2024-05-02 NOTE — TELEPHONE ENCOUNTER
Rx refill:    metoprolol succinate (TOPROL XL) 25 MG extended release tablet [[id number]]    Order Details  Dose: 1 tablet Route: Oral Frequency: DAILY   Dispense Quantity: -- Refills: --          Sig: Take 1 tablet by mouth daily       Pharmacy    [facility]/pharmacy #[id number] - [place], [place] - [facility] [facility] -  [phone] - F [phone]  [facility] [facility] [address]  Phone: [phone]  Fax: [phone]

## 2024-06-24 ENCOUNTER — OFFICE VISIT (OUTPATIENT)
Facility: CLINIC | Age: 84
End: 2024-06-24
Payer: MEDICARE

## 2024-06-24 VITALS
RESPIRATION RATE: 16 BRPM | HEIGHT: 60 IN | WEIGHT: 110.5 LBS | TEMPERATURE: 98 F | BODY MASS INDEX: 21.69 KG/M2 | OXYGEN SATURATION: 97 % | HEART RATE: 60 BPM | SYSTOLIC BLOOD PRESSURE: 130 MMHG | DIASTOLIC BLOOD PRESSURE: 80 MMHG

## 2024-06-24 DIAGNOSIS — R10.9 RIGHT FLANK PAIN: Primary | ICD-10-CM

## 2024-06-24 PROCEDURE — 1123F ACP DISCUSS/DSCN MKR DOCD: CPT | Performed by: INTERNAL MEDICINE

## 2024-06-24 PROCEDURE — 3075F SYST BP GE 130 - 139MM HG: CPT | Performed by: INTERNAL MEDICINE

## 2024-06-24 PROCEDURE — 3079F DIAST BP 80-89 MM HG: CPT | Performed by: INTERNAL MEDICINE

## 2024-06-24 PROCEDURE — G8420 CALC BMI NORM PARAMETERS: HCPCS | Performed by: INTERNAL MEDICINE

## 2024-06-24 PROCEDURE — 99214 OFFICE O/P EST MOD 30 MIN: CPT | Performed by: INTERNAL MEDICINE

## 2024-06-24 PROCEDURE — G8427 DOCREV CUR MEDS BY ELIG CLIN: HCPCS | Performed by: INTERNAL MEDICINE

## 2024-06-24 PROCEDURE — 1036F TOBACCO NON-USER: CPT | Performed by: INTERNAL MEDICINE

## 2024-06-24 NOTE — PROGRESS NOTES
Subjective:   Leonides Appiah is a 83 y.o. male      Chief Complaint   Patient presents with    Back Pain     R side lower back pain        History of present illness: He presents with some right lower back/right flank pain that started yesterday and has persisted.  He notes it feels a little better if it rubs on it at times but he really cannot reproduce it by rubbing on his back.  He notes it just seems to be a little bit better when he gets in certain positions sometimes.  If he lays down it does not seem to hurt him.  He does note his balance is been a little slow to last few days.  He notes no urinary frequency or dysuria.  He has noted no fevers or chills.  He does get a little nauseated time but has not thrown up.  He notes no other specific complaints.    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    Epigastric pain    Cardiomyopathy (HCC)    Controlled type 2 diabetes mellitus with diabetic polyneuropathy, without long-term current use of insulin (HCC)    Constipation, chronic    Anxiety    Neuropathy    Diverticulosis    Essential hypertension    Neck pain    Mixed hyperlipidemia    Alcohol screening    Bradycardia    Primary osteoarthritis involving multiple joints    ED (erectile dysfunction)    Vitamin D deficiency    Gross hematuria    Edema      Past Medical History:   Diagnosis Date    Anxiety 8/5/2017    Arrhythmia     Arthritis     Atherosclerotic heart disease of native coronary artery without angina pectoris 8/5/2017    Bradycardia 8/5/2017    CAD (coronary artery disease)     Constipation, chronic 8/5/2017    Diabetes (HCC)     diet controlled    Diabetes mellitus (HCC) 8/5/2017    Diverticulosis 8/5/2017    ED (erectile dysfunction) 8/5/2017    GERD (gastroesophageal reflux disease)     Hypertension     Hypogonadism male 8/5/2017    Kidney stone     Neuropathy 8/5/2017    On statin therapy 8/5/2017    Right foot pain 8/5/2017

## 2024-06-24 NOTE — PROGRESS NOTES
Leonides Appiah is a 83 y.o. male     Chief Complaint   Patient presents with    Back Pain     R side lower back pain       /80 (Site: Left Upper Arm, Position: Sitting, Cuff Size: Large Adult)   Pulse 60   Temp 98 °F (36.7 °C) (Oral)   Resp 16   Ht 1.499 m (4' 11\")   Wt 50.1 kg (110 lb 8 oz)   SpO2 97%   BMI 22.32 kg/m²     Health Maintenance Due   Topic Date Due    DTaP/Tdap/Td vaccine (1 - Tdap) Never done    Respiratory Syncytial Virus (RSV) Pregnant or age 60 yrs+ (1 - 1-dose 60+ series) Never done    Pneumococcal 65+ years Vaccine (2 of 2 - PPSV23 or PCV20) 11/09/2015    COVID-19 Vaccine (6 - 2023-24 season) 11/29/2023         \"Have you been to the ER, urgent care clinic since your last visit?  Hospitalized since your last visit?\"    NO    “Have you seen or consulted any other health care providers outside of [facility] since your last visit?”    NO

## 2024-06-25 ENCOUNTER — HOSPITAL ENCOUNTER (OUTPATIENT)
Age: 84
Discharge: HOME | End: 2024-06-28
Payer: MEDICARE

## 2024-06-25 DIAGNOSIS — N20.0 KIDNEY STONES: Primary | ICD-10-CM

## 2024-06-25 DIAGNOSIS — N20.0 KIDNEY STONES: ICD-10-CM

## 2024-06-25 DIAGNOSIS — R10.9 RIGHT FLANK PAIN: ICD-10-CM

## 2024-06-25 LAB
APPEARANCE UR: CLEAR
BACTERIA URNS QL MICRO: NEGATIVE /HPF
BASOPHILS # BLD: 0 K/UL (ref 0–0.1)
BASOPHILS NFR BLD: 0 % (ref 0–1)
BILIRUB UR QL: NEGATIVE
COLOR UR: ABNORMAL
DIFFERENTIAL METHOD BLD: ABNORMAL
EOSINOPHIL # BLD: 0.1 K/UL (ref 0–0.4)
EOSINOPHIL NFR BLD: 0 % (ref 0–7)
EPITH CASTS URNS QL MICRO: ABNORMAL /LPF
ERYTHROCYTE [DISTWIDTH] IN BLOOD BY AUTOMATED COUNT: 13.3 % (ref 11.5–14.5)
GLUCOSE UR STRIP.AUTO-MCNC: NEGATIVE MG/DL
HCT VFR BLD AUTO: 47.4 % (ref 36.6–50.3)
HGB BLD-MCNC: 15.4 G/DL (ref 12.1–17)
HGB UR QL STRIP: ABNORMAL
HYALINE CASTS URNS QL MICRO: ABNORMAL /LPF (ref 0–5)
IMM GRANULOCYTES # BLD AUTO: 0.1 K/UL (ref 0–0.04)
IMM GRANULOCYTES NFR BLD AUTO: 1 % (ref 0–0.5)
KETONES UR QL STRIP.AUTO: NEGATIVE MG/DL
LEUKOCYTE ESTERASE UR QL STRIP.AUTO: ABNORMAL
LYMPHOCYTES # BLD: 3.2 K/UL (ref 0.8–3.5)
LYMPHOCYTES NFR BLD: 28 % (ref 12–49)
MCH RBC QN AUTO: 32.9 PG (ref 26–34)
MCHC RBC AUTO-ENTMCNC: 32.5 G/DL (ref 30–36.5)
MCV RBC AUTO: 101.3 FL (ref 80–99)
MONOCYTES # BLD: 1.1 K/UL (ref 0–1)
MONOCYTES NFR BLD: 10 % (ref 5–13)
NEUTS SEG # BLD: 6.9 K/UL (ref 1.8–8)
NEUTS SEG NFR BLD: 61 % (ref 32–75)
NITRITE UR QL STRIP.AUTO: NEGATIVE
NRBC # BLD: 0 K/UL (ref 0–0.01)
NRBC BLD-RTO: 0 PER 100 WBC
PH UR STRIP: 7 (ref 5–8)
PLATELET # BLD AUTO: 239 K/UL (ref 150–400)
PMV BLD AUTO: 10.5 FL (ref 8.9–12.9)
PROT UR STRIP-MCNC: NEGATIVE MG/DL
RBC # BLD AUTO: 4.68 M/UL (ref 4.1–5.7)
RBC #/AREA URNS HPF: ABNORMAL /HPF (ref 0–5)
SP GR UR REFRACTOMETRY: 1.02 (ref 1–1.03)
UROBILINOGEN UR QL STRIP.AUTO: 0.2 EU/DL (ref 0.2–1)
WBC # BLD AUTO: 11.3 K/UL (ref 4.1–11.1)
WBC URNS QL MICRO: ABNORMAL /HPF (ref 0–4)

## 2024-06-25 PROCEDURE — 74177 CT ABD & PELVIS W/CONTRAST: CPT

## 2024-06-25 PROCEDURE — 6360000004 HC RX CONTRAST MEDICATION: Performed by: INTERNAL MEDICINE

## 2024-06-25 RX ORDER — KETOROLAC TROMETHAMINE 10 MG/1
10 TABLET, FILM COATED ORAL EVERY 6 HOURS PRN
Qty: 20 TABLET | Refills: 0 | Status: SHIPPED | OUTPATIENT
Start: 2024-06-25 | End: 2024-06-26

## 2024-06-25 RX ADMIN — IOPAMIDOL 100 ML: 755 INJECTION, SOLUTION INTRAVENOUS at 09:25

## 2024-06-25 NOTE — TELEPHONE ENCOUNTER
RX refill request from the patient/pharmacy. Patient last seen 06- with labs, and next appt. scheduled for 07-  Requested Prescriptions     Pending Prescriptions Disp Refills    ketorolac (TORADOL) 10 MG tablet 20 tablet 0     Sig: Take 1 tablet by mouth every 6 hours as needed for Pain    .

## 2024-06-26 ENCOUNTER — HOSPITAL ENCOUNTER (EMERGENCY)
Facility: HOSPITAL | Age: 84
Discharge: HOME | End: 2024-06-26
Attending: EMERGENCY MEDICINE
Payer: MEDICARE

## 2024-06-26 VITALS
OXYGEN SATURATION: 98 % | TEMPERATURE: 97.5 F | WEIGHT: 109.35 LBS | DIASTOLIC BLOOD PRESSURE: 70 MMHG | BODY MASS INDEX: 22.09 KG/M2 | HEART RATE: 60 BPM | SYSTOLIC BLOOD PRESSURE: 112 MMHG | RESPIRATION RATE: 16 BRPM

## 2024-06-26 DIAGNOSIS — R10.9 RIGHT FLANK PAIN: Primary | ICD-10-CM

## 2024-06-26 LAB
ALBUMIN SERPL-MCNC: 3.4 G/DL (ref 3.5–5)
ALBUMIN/GLOB SERPL: 1.2 (ref 1.1–2.2)
ALP SERPL-CCNC: 73 U/L (ref 45–117)
ALT SERPL-CCNC: 22 U/L (ref 12–78)
ANION GAP SERPL CALC-SCNC: 5 MMOL/L (ref 5–15)
AST SERPL-CCNC: 34 U/L (ref 15–37)
BACTERIA SPEC CULT: NORMAL
BASOPHILS # BLD: 0 K/UL (ref 0–0.1)
BASOPHILS NFR BLD: 0 % (ref 0–1)
BILIRUB SERPL-MCNC: 0.7 MG/DL (ref 0.2–1)
BUN SERPL-MCNC: 13 MG/DL (ref 6–20)
BUN/CREAT SERPL: 15 (ref 12–20)
CALCIUM SERPL-MCNC: 9.8 MG/DL (ref 8.5–10.1)
CHLORIDE SERPL-SCNC: 105 MMOL/L (ref 97–108)
CO2 SERPL-SCNC: 31 MMOL/L (ref 21–32)
CREAT SERPL-MCNC: 0.87 MG/DL (ref 0.7–1.3)
DIFFERENTIAL METHOD BLD: ABNORMAL
EOSINOPHIL # BLD: 0.1 K/UL (ref 0–0.4)
EOSINOPHIL NFR BLD: 1 % (ref 0–7)
ERYTHROCYTE [DISTWIDTH] IN BLOOD BY AUTOMATED COUNT: 13 % (ref 11.5–14.5)
GLOBULIN SER CALC-MCNC: 2.9 G/DL (ref 2–4)
GLUCOSE SERPL-MCNC: 85 MG/DL (ref 65–100)
HCT VFR BLD AUTO: 44.2 % (ref 36.6–50.3)
HGB BLD-MCNC: 14.7 G/DL (ref 12.1–17)
IMM GRANULOCYTES # BLD AUTO: 0 K/UL (ref 0–0.04)
IMM GRANULOCYTES NFR BLD AUTO: 0 % (ref 0–0.5)
LYMPHOCYTES # BLD: 2.2 K/UL (ref 0.8–3.5)
LYMPHOCYTES NFR BLD: 29 % (ref 12–49)
MCH RBC QN AUTO: 33.3 PG (ref 26–34)
MCHC RBC AUTO-ENTMCNC: 33.3 G/DL (ref 30–36.5)
MCV RBC AUTO: 100.2 FL (ref 80–99)
MONOCYTES # BLD: 1 K/UL (ref 0–1)
MONOCYTES NFR BLD: 13 % (ref 5–13)
NEUTS SEG # BLD: 4.2 K/UL (ref 1.8–8)
NEUTS SEG NFR BLD: 57 % (ref 32–75)
NRBC # BLD: 0 K/UL (ref 0–0.01)
NRBC BLD-RTO: 0 PER 100 WBC
PLATELET # BLD AUTO: 212 K/UL (ref 150–400)
PMV BLD AUTO: 9.5 FL (ref 8.9–12.9)
POTASSIUM SERPL-SCNC: 4.5 MMOL/L (ref 3.5–5.1)
PROT SERPL-MCNC: 6.3 G/DL (ref 6.4–8.2)
RBC # BLD AUTO: 4.41 M/UL (ref 4.1–5.7)
SERVICE CMNT-IMP: NORMAL
SODIUM SERPL-SCNC: 141 MMOL/L (ref 136–145)
WBC # BLD AUTO: 7.5 K/UL (ref 4.1–11.1)

## 2024-06-26 PROCEDURE — 80053 COMPREHEN METABOLIC PANEL: CPT

## 2024-06-26 PROCEDURE — 6370000000 HC RX 637 (ALT 250 FOR IP): Performed by: EMERGENCY MEDICINE

## 2024-06-26 PROCEDURE — 96374 THER/PROPH/DIAG INJ IV PUSH: CPT

## 2024-06-26 PROCEDURE — 36415 COLL VENOUS BLD VENIPUNCTURE: CPT

## 2024-06-26 PROCEDURE — 6360000002 HC RX W HCPCS: Performed by: EMERGENCY MEDICINE

## 2024-06-26 PROCEDURE — 85025 COMPLETE CBC W/AUTO DIFF WBC: CPT

## 2024-06-26 PROCEDURE — 99284 EMERGENCY DEPT VISIT MOD MDM: CPT

## 2024-06-26 RX ORDER — LIDOCAINE 4 G/G
1 PATCH TOPICAL
Status: DISCONTINUED | OUTPATIENT
Start: 2024-06-26 | End: 2024-06-26 | Stop reason: HOSPADM

## 2024-06-26 RX ORDER — MELOXICAM 7.5 MG/1
7.5 TABLET ORAL DAILY
Qty: 90 TABLET | Refills: 0 | Status: SHIPPED | OUTPATIENT
Start: 2024-06-26

## 2024-06-26 RX ORDER — LIDOCAINE 50 MG/G
1 PATCH TOPICAL DAILY
Qty: 10 PATCH | Refills: 0 | Status: SHIPPED | OUTPATIENT
Start: 2024-06-26 | End: 2024-07-06

## 2024-06-26 RX ORDER — KETOROLAC TROMETHAMINE 30 MG/ML
15 INJECTION, SOLUTION INTRAMUSCULAR; INTRAVENOUS
Status: COMPLETED | OUTPATIENT
Start: 2024-06-26 | End: 2024-06-26

## 2024-06-26 RX ADMIN — KETOROLAC TROMETHAMINE 15 MG: 30 INJECTION, SOLUTION INTRAMUSCULAR at 13:25

## 2024-06-26 ASSESSMENT — PAIN SCALES - GENERAL: PAINLEVEL_OUTOF10: 7

## 2024-06-26 NOTE — ED PROVIDER NOTES
[facility] EMERGENCY DEPT  EMERGENCY DEPARTMENT ENCOUNTER       Pt Name: Leonides Appiah  MRN: [id number]  Birthdate 1940  Date of evaluation: 6/26/2024  Provider: Eduardo Fisher MD   PCP: Fredi Santizo MD  Note Started: 2:18 PM EDT 6/26/24     CHIEF COMPLAINT       Chief Complaint   Patient presents with    Side Pain     Patient arrives ambulatory to triage with cc of right side pain. The pain originated in the flank area but has since moved lower. He denies and /GI. The pain does not radiate and gets better with lying down. He denies injury        HISTORY OF PRESENT ILLNESS: 1 or more elements      History From: Patient and medical records, History limited by:   none     Leonides Appiah is a 83 y.o. male patient with history of coronary artery disease, diabetes, hypertension, here for right side pain.  The pain started a week ago.  The patient denies any trauma or fall.  Denies fever, dysuria, abdominal pain, chest pain, shortness of breath.  He saw his primary care for his symptoms, and a CT abdomen pelvis with contrast yesterday, which show bilateral nonobstructive kidney stones and no hydronephrosis.  He also had a urinalysis which was unremarkable.  A prescription for Toradol was called in for him yesterday, but he has not picked it up yet.  He has not taken anything for pain since his symptoms started.  He says the pain can be a 7 out of 10 in severity, but is mild or nonexistent at rest.       Please See MDM for Additional Details of the HPI/PMH  Nursing Notes were all reviewed and agreed with or any disagreements were addressed in the HPI.     REVIEW OF SYSTEMS        Positives and Pertinent negatives as per HPI.    PAST HISTORY     Past Medical History:  Past Medical History:   Diagnosis Date    Anxiety 8/5/2017    Arrhythmia     Arthritis     Atherosclerotic heart disease of native coronary artery without angina pectoris 8/5/2017    Bradycardia 8/5/2017    CAD (coronary artery disease)     Constipation,

## 2024-06-26 NOTE — TELEPHONE ENCOUNTER
PCP: Fredi Santizo MD    Last appt: 6/24/2024    Future Appointments   Date Time Provider Department Center   7/16/2024  9:10 AM Fredi Santizo MD [facility]       Requested Prescriptions     Pending Prescriptions Disp Refills    meloxicam (MOBIC) 7.5 MG tablet [Pharmacy Med Name: MELOXICAM 7.5 MG TABLET] 90 tablet 0     Sig: Take 1 tablet by mouth daily

## 2024-07-11 RX ORDER — LIDOCAINE 50 MG/G
1 PATCH TOPICAL DAILY
Qty: 10 PATCH | Refills: 0 | Status: SHIPPED | OUTPATIENT
Start: 2024-07-11 | End: 2024-07-21

## 2024-07-11 NOTE — TELEPHONE ENCOUNTER
PCP: Fredi Santizo MD    Last appt: 6/24/2024    Future Appointments   Date Time Provider Department Center   7/16/2024  9:10 AM Fredi Santioz MD [facility]       Requested Prescriptions     Pending Prescriptions Disp Refills    lidocaine (LIDODERM) 5 % [Pharmacy Med Name: LIDOCAINE 5% PATCH] 10 patch 0     Sig: PLACE 1 PATCH ONTO THE SKIN DAILY FOR 10 DAYS 12 HOURS ON, 12 HOURS OFF.

## 2024-07-12 NOTE — PATIENT INSTRUCTIONS
Wound Care Consult     Visit Date: 7/12/2024      Patient Name: Fernando Cuevas         MRN: [id number]           YOB: 1961     Reason for Consult: NPWT dressing change         Pertinent Labs:   Albumin   Date Value Ref Range Status   06/21/2024 2.9 (L) 3.4 - 5.0 g/dL Final       Wound Assessment:  Wound 06/21/24 Incision Abdomen Medial;Upper (Active)   Wound Image   07/12/24 1546   Site Assessment Unable to assess 07/10/24 2050   Sparkle-Wound Assessment Unable to assess 07/09/24 0400   Shape vertical 06/30/24 2025   Wound Length (cm) 9 cm 07/12/24 1546   Wound Width (cm) 2 cm 07/12/24 1546   Wound Surface Area (cm^2) 18 cm^2 07/12/24 1546   Wound Depth (cm) 1.3 cm 07/12/24 1546   Wound Volume (cm^3) 23.4 cm^3 07/12/24 1546   Wound Healing % 35 07/12/24 1546   Tunneling 0.6 cm 07/12/24 1546   Tunneling Clock Position of Wound 12 07/12/24 1546   Margins Attached edges 06/30/24 2025   Closure Closure device 07/10/24 2050   Sutures/Staple Line Approximated 07/01/24 0926   Drainage Description Red 07/10/24 2050   Drainage Amount Small 07/04/24 0900   Dressing Dry dressing 07/11/24 2130   Dressing Changed New 07/04/24 0900   Dressing Status Clean;Occlusive 07/11/24 2130       Wound Team Summary Assessment: Wound Vac applied to midline   (2)  Mepitel   (2)  black foam   (medium)Granufoam       Pressure; 125 mmHg    Plan:  Change wound Vac 2 times a week and as needed.     Consider medicating the patient 30 - 60 minutes prior to dressing change. If there is mamta blood in the tubing (active bleeding), stop the suction and call physician. If the suction is off for greater than 2 hours, remove foam dressing and replace with moist saline dressing. Change canister every week or when full. Remove foam dressing and replace with saline dressing for transfer or discharge.    If patient is discharged prior to next dressing change, please disconnect VAC machine, remove foam dressing apply moist saline dressing. Then  Anxiety Disorder: Care Instructions Your Care Instructions Anxiety is a normal reaction to stress. Difficult situations can cause you to have symptoms such as sweaty palms and a nervous feeling. In an anxiety disorder, the symptoms are far more severe. Constant worry, muscle tension, trouble sleeping, nausea and diarrhea, and other symptoms can make normal daily activities difficult or impossible. These symptoms may occur for no reason, and they can affect your work, school, or social life. Medicines, counseling, and self-care can all help. Follow-up care is a key part of your treatment and safety. Be sure to make and go to all appointments, and call your doctor if you are having problems. It's also a good idea to know your test results and keep a list of the medicines you take. How can you care for yourself at home? · Take medicines exactly as directed. Call your doctor if you think you are having a problem with your medicine. · Go to your counseling sessions and follow-up appointments. · Recognize and accept your anxiety. Then, when you are in a situation that makes you anxious, say to yourself, \"This is not an emergency. I feel uncomfortable, but I am not in danger. I can keep going even if I feel anxious. \" · Be kind to your body: 
? Relieve tension with exercise or a massage. ? Get enough rest. 
? Avoid alcohol, caffeine, nicotine, and illegal drugs. They can increase your anxiety level and cause sleep problems. ? Learn and do relaxation techniques. See below for more about these techniques. · Engage your mind. Get out and do something you enjoy. Go to a funny movie, or take a walk or hike. Plan your day. Having too much or too little to do can make you anxious. · Keep a record of your symptoms. Discuss your fears with a good friend or family member, or join a support group for people with similar problems. Talking to others sometimes relieves stress.  
· Get involved in social groups, or place machine in the front soiled utility room on the unit.       Wound Team Plan: WOC will complete dressing change two times a week and as needed     While in bed patient should only be on one fitted sheet, and one chux. Please, do not use brief while patient is resting in bed. Elevate heels off the bed surface at all times. Turn and reposition at least every 2 hours.        Brianna Hill RN BSN,[facility],CWN  [phone]   7/12/2024  5:25 PM         volunteer to help others. Being alone sometimes makes things seem worse than they are. · Get at least 30 minutes of exercise on most days of the week to relieve stress. Walking is a good choice. You also may want to do other activities, such as running, swimming, cycling, or playing tennis or team sports. Relaxation techniques Do relaxation exercises 10 to 20 minutes a day. You can play soothing, relaxing music while you do them, if you wish. · Tell others in your house that you are going to do your relaxation exercises. Ask them not to disturb you. · Find a comfortable place, away from all distractions and noise. · Lie down on your back, or sit with your back straight. · Focus on your breathing. Make it slow and steady. · Breathe in through your nose. Breathe out through either your nose or mouth. · Breathe deeply, filling up the area between your navel and your rib cage. Breathe so that your belly goes up and down. · Do not hold your breath. · Breathe like this for 5 to 10 minutes. Notice the feeling of calmness throughout your whole body. As you continue to breathe slowly and deeply, relax by doing the following for another 5 to 10 minutes: · Tighten and relax each muscle group in your body. You can begin at your toes and work your way up to your head. · Imagine your muscle groups relaxing and becoming heavy. · Empty your mind of all thoughts. · Let yourself relax more and more deeply. · Become aware of the state of calmness that surrounds you. · When your relaxation time is over, you can bring yourself back to alertness by moving your fingers and toes and then your hands and feet and then stretching and moving your entire body. Sometimes people fall asleep during relaxation, but they usually wake up shortly afterward. · Always give yourself time to return to full alertness before you drive a car or do anything that might cause an accident if you are not fully alert.  Never play a relaxation tape while you drive a car. When should you call for help? Call 911 anytime you think you may need emergency care. For example, call if: 
  · You feel you cannot stop from hurting yourself or someone else. Keep the numbers for these national suicide hotlines: 3-174-031-TALK (2-288.153.3506) and 5-047-XVECVRW (2-966.646.2486). If you or someone you know talks about suicide or feeling hopeless, get help right away. Watch closely for changes in your health, and be sure to contact your doctor if: 
  · You have anxiety or fear that affects your life.  
  · You have symptoms of anxiety that are new or different from those you had before. Where can you learn more? Go to http://www.torres.com/ Enter P754 in the search box to learn more about \"Anxiety Disorder: Care Instructions. \" Current as of: September 23, 2020               Content Version: 12.8 © 2006-2021 [employer]. Care instructions adapted under license by [employer] (which disclaims liability or warranty for this information). If you have questions about a medical condition or this instruction, always ask your healthcare professional. [address] any warranty or liability for your use of this information.

## 2024-07-15 NOTE — PROGRESS NOTES
Chief Complaint   Patient presents with    Follow-up       SUBJECTIVE:    Leonides Appiah is a 84 y.o. male who returns in follow-up for his medical problems include hypertension, hyperlipidemia, diabetes, DJD, ASCVD, cardiomyopathy and other medical problems.  He is taking his medication transformers diet remains physically active.  Currently he notes no chest pain, shortness of breath or cardiac Rester complaints except a little swelling in his left ankle.  He has no significant swelling on the right side.  He notes no GI or  complaints.  He has no headaches, dizziness or neurologic complaints.  He has no current active arthritic complaints and there are no other complaints on complete review of systems.      Current Outpatient Medications   Medication Sig Dispense Refill    Coenzyme Q10 (COQ-10) 100 MG CAPS Take 1 capsule by mouth daily      lidocaine (LIDODERM) 5 % PLACE 1 PATCH ONTO THE SKIN DAILY FOR 10 DAYS 12 HOURS ON, 12 HOURS OFF. 10 patch 0    metoprolol succinate (TOPROL XL) 25 MG extended release tablet Take 1 tablet by mouth daily 30 tablet 5    ramipril (ALTACE) 10 MG capsule TAKE 2 CAPSULES BY MOUTH EVERY  capsule 3    bumetanide (BUMEX) 0.5 MG tablet TAKE 1 TABLET BY MOUTH EVERY DAY 90 tablet 3    metFORMIN (GLUCOPHAGE) 500 MG tablet TAKE 1 TABLET BY MOUTH TWICE A DAY WITH MEALS (Patient taking differently: Take 1 tablet by mouth daily (with breakfast)) 180 tablet 3    atorvastatin (LIPITOR) 40 MG tablet Take 1 tablet by mouth daily 90 tablet 3    amLODIPine (NORVASC) 5 MG tablet TAKE 1 TABLET BY MOUTH EVERY DAY 90 tablet 3    omeprazole (PRILOSEC) 20 MG delayed release capsule Take 1 capsule by mouth daily      SODIUM FLUORIDE, DENTAL GEL, 1.1 % GEL BRUSH WITH A PEA SIZED AMOUNT FOR 2 MINUTES BEFORE BEDTIME. DO NOT RINSE/DRINK/EAT AFTER BRUSHING.      polyethylene glycol (GLYCOLAX) 17 GM/SCOOP powder MIX 17G IN WATER AND DRINK DAILY (Patient not taking: Reported on 7/16/2024)       No

## 2024-07-16 ENCOUNTER — OFFICE VISIT (OUTPATIENT)
Facility: CLINIC | Age: 84
End: 2024-07-16
Payer: MEDICARE

## 2024-07-16 VITALS
TEMPERATURE: 97.9 F | BODY MASS INDEX: 21.64 KG/M2 | RESPIRATION RATE: 16 BRPM | WEIGHT: 110.2 LBS | DIASTOLIC BLOOD PRESSURE: 78 MMHG | HEIGHT: 60 IN | HEART RATE: 64 BPM | OXYGEN SATURATION: 98 % | SYSTOLIC BLOOD PRESSURE: 134 MMHG

## 2024-07-16 DIAGNOSIS — I25.10 ASCVD (ARTERIOSCLEROTIC CARDIOVASCULAR DISEASE): ICD-10-CM

## 2024-07-16 DIAGNOSIS — I42.9 CARDIOMYOPATHY, UNSPECIFIED TYPE (HCC): ICD-10-CM

## 2024-07-16 DIAGNOSIS — E78.2 MIXED HYPERLIPIDEMIA: ICD-10-CM

## 2024-07-16 DIAGNOSIS — I10 ESSENTIAL HYPERTENSION: Primary | ICD-10-CM

## 2024-07-16 DIAGNOSIS — M15.9 PRIMARY OSTEOARTHRITIS INVOLVING MULTIPLE JOINTS: ICD-10-CM

## 2024-07-16 DIAGNOSIS — E11.42 CONTROLLED TYPE 2 DIABETES MELLITUS WITH DIABETIC POLYNEUROPATHY, WITHOUT LONG-TERM CURRENT USE OF INSULIN (HCC): ICD-10-CM

## 2024-07-16 LAB
ALBUMIN SERPL-MCNC: 3.9 G/DL (ref 3.5–5)
ALBUMIN/GLOB SERPL: 1.2 (ref 1.1–2.2)
ALP SERPL-CCNC: 84 U/L (ref 45–117)
ALT SERPL-CCNC: 22 U/L (ref 12–78)
ANION GAP SERPL CALC-SCNC: 8 MMOL/L (ref 5–15)
AST SERPL-CCNC: 24 U/L (ref 15–37)
BILIRUB SERPL-MCNC: 1.2 MG/DL (ref 0.2–1)
BUN SERPL-MCNC: 15 MG/DL (ref 6–20)
BUN/CREAT SERPL: 16 (ref 12–20)
CALCIUM SERPL-MCNC: 10.3 MG/DL (ref 8.5–10.1)
CHLORIDE SERPL-SCNC: 104 MMOL/L (ref 97–108)
CHOLEST SERPL-MCNC: 152 MG/DL
CK SERPL-CCNC: 108 U/L (ref 39–308)
CO2 SERPL-SCNC: 29 MMOL/L (ref 21–32)
CREAT SERPL-MCNC: 0.92 MG/DL (ref 0.7–1.3)
EST. AVERAGE GLUCOSE BLD GHB EST-MCNC: 123 MG/DL
GLOBULIN SER CALC-MCNC: 3.3 G/DL (ref 2–4)
GLUCOSE SERPL-MCNC: 98 MG/DL (ref 65–100)
HBA1C MFR BLD: 5.9 % (ref 4–5.6)
HDLC SERPL-MCNC: 57 MG/DL
HDLC SERPL: 2.7 (ref 0–5)
LDLC SERPL CALC-MCNC: 79.2 MG/DL (ref 0–100)
POTASSIUM SERPL-SCNC: 4.5 MMOL/L (ref 3.5–5.1)
PROT SERPL-MCNC: 7.2 G/DL (ref 6.4–8.2)
SODIUM SERPL-SCNC: 141 MMOL/L (ref 136–145)
TRIGL SERPL-MCNC: 79 MG/DL
VLDLC SERPL CALC-MCNC: 15.8 MG/DL

## 2024-07-16 PROCEDURE — G8427 DOCREV CUR MEDS BY ELIG CLIN: HCPCS | Performed by: INTERNAL MEDICINE

## 2024-07-16 PROCEDURE — 3075F SYST BP GE 130 - 139MM HG: CPT | Performed by: INTERNAL MEDICINE

## 2024-07-16 PROCEDURE — 3044F HG A1C LEVEL LT 7.0%: CPT | Performed by: INTERNAL MEDICINE

## 2024-07-16 PROCEDURE — 1123F ACP DISCUSS/DSCN MKR DOCD: CPT | Performed by: INTERNAL MEDICINE

## 2024-07-16 PROCEDURE — 3078F DIAST BP <80 MM HG: CPT | Performed by: INTERNAL MEDICINE

## 2024-07-16 PROCEDURE — 99214 OFFICE O/P EST MOD 30 MIN: CPT | Performed by: INTERNAL MEDICINE

## 2024-07-16 PROCEDURE — G8420 CALC BMI NORM PARAMETERS: HCPCS | Performed by: INTERNAL MEDICINE

## 2024-07-16 PROCEDURE — 1036F TOBACCO NON-USER: CPT | Performed by: INTERNAL MEDICINE

## 2024-07-16 NOTE — PROGRESS NOTES
Leonides Appiah is a 84 y.o. male     Chief Complaint   Patient presents with    Follow-up       /78 (Site: Left Upper Arm, Position: Sitting, Cuff Size: Medium Adult)   Pulse 64   Temp 97.9 °F (36.6 °C) (Oral)   Resp 16   Ht 1.499 m (4' 11\")   Wt 50 kg (110 lb 3.2 oz)   SpO2 98%   BMI 22.26 kg/m²     Health Maintenance Due   Topic Date Due    DTaP/Tdap/Td vaccine (1 - Tdap) Never done    Respiratory Syncytial Virus (RSV) Pregnant or age 60 yrs+ (1 - 1-dose 60+ series) Never done    Pneumococcal 65+ years Vaccine (2 of 2 - PPSV23 or PCV20) 11/09/2015    COVID-19 Vaccine (6 - 2023-24 season) 11/29/2023         \"Have you been to the ER, urgent care clinic since your last visit?  Hospitalized since your last visit?\"    YES - When: approximately 1 months ago.  Where and Why: [facility] Right flank pain.    “Have you seen or consulted any other health care providers outside of [facility] since your last visit?”    NO

## 2024-07-29 DIAGNOSIS — E78.2 MIXED HYPERLIPIDEMIA: ICD-10-CM

## 2024-07-29 DIAGNOSIS — I10 ESSENTIAL HYPERTENSION: Primary | ICD-10-CM

## 2024-07-29 RX ORDER — ATORVASTATIN CALCIUM 40 MG/1
40 TABLET, FILM COATED ORAL DAILY
Qty: 90 TABLET | Refills: 0 | Status: SHIPPED | OUTPATIENT
Start: 2024-07-29

## 2024-07-29 RX ORDER — AMLODIPINE BESYLATE 5 MG/1
TABLET ORAL
Qty: 90 TABLET | Refills: 0 | Status: SHIPPED | OUTPATIENT
Start: 2024-07-29

## 2024-07-29 NOTE — TELEPHONE ENCOUNTER
RX refill request from the patient/pharmacy. Patient last seen 07- with labs, and next appt. scheduled for 11-  Requested Prescriptions     Pending Prescriptions Disp Refills    amLODIPine (NORVASC) 5 MG tablet [Pharmacy Med Name: AMLODIPINE BESYLATE 5 MG TAB] 90 tablet 3     Sig: TAKE 1 TABLET BY MOUTH EVERY DAY    atorvastatin (LIPITOR) 40 MG tablet [Pharmacy Med Name: ATORVASTATIN 40 MG TABLET] 90 tablet 3     Sig: TAKE 1 TABLET BY MOUTH EVERY DAY    .

## 2024-09-10 RX ORDER — METOPROLOL SUCCINATE 25 MG/1
25 TABLET, EXTENDED RELEASE ORAL DAILY
Qty: 90 TABLET | Refills: 3 | Status: SHIPPED | OUTPATIENT
Start: 2024-09-10

## 2024-09-18 ENCOUNTER — TELEPHONE (OUTPATIENT)
Facility: CLINIC | Age: 84
End: 2024-09-18

## 2024-10-20 PROBLEM — Z01.810 PREOPERATIVE CARDIOVASCULAR EXAMINATION: Status: ACTIVE | Noted: 2024-10-20

## 2024-10-20 NOTE — PROGRESS NOTES
HPI:   84 y.o.  presents for preoperative medical consultation and clearance for planned bilateral cataract surgery to be done at [facility] by Dr. Tad Marinelli.  He is to have the first done on 11/4/2024 with the second on 11/25/2024.  He is followed by me regular for hypertension, diabetes, hyperlipidemia, ASCVD, cardiomyopathy and other multiple medical problems.  He currently notes no chest pain, shortness of breath, palpitations, PND, orthopnea or other cardiac respiratory complaints.  He notes no current GI or  complaints.  He has no headaches, dizziness or neurologic complaints.  He notes no current arthritic complaints and there are no other complaints on complete review of systems.    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    Epigastric pain    Cardiomyopathy (HCC)    Controlled type 2 diabetes mellitus with diabetic polyneuropathy, without long-term current use of insulin (HCC)    Constipation, chronic    Anxiety    Neuropathy    Diverticulosis    Essential hypertension    Neck pain    Mixed hyperlipidemia    Alcohol screening    Bradycardia    Primary osteoarthritis involving multiple joints    ED (erectile dysfunction)    Vitamin D deficiency    Gross hematuria    Edema    Preoperative cardiovascular examination    Age-related cataract of both eyes        Past Medical History:   Diagnosis Date    Anxiety 8/5/2017    Arrhythmia     Arthritis     Atherosclerotic heart disease of native coronary artery without angina pectoris 8/5/2017    Bradycardia 8/5/2017    CAD (coronary artery disease)     Constipation, chronic 8/5/2017    Diabetes (HCC)     diet controlled    Diabetes mellitus (HCC) 8/5/2017    Diverticulosis 8/5/2017    ED (erectile dysfunction) 8/5/2017    GERD (gastroesophageal reflux disease)     Hypertension     Hypogonadism male 8/5/2017    Kidney stone     Neuropathy 8/5/2017    On statin therapy

## 2024-10-21 ENCOUNTER — OFFICE VISIT (OUTPATIENT)
Facility: CLINIC | Age: 84
End: 2024-10-21
Payer: MEDICARE

## 2024-10-21 VITALS
HEIGHT: 60 IN | DIASTOLIC BLOOD PRESSURE: 80 MMHG | OXYGEN SATURATION: 97 % | HEART RATE: 60 BPM | TEMPERATURE: 97.9 F | RESPIRATION RATE: 16 BRPM | SYSTOLIC BLOOD PRESSURE: 110 MMHG | WEIGHT: 107.5 LBS | BODY MASS INDEX: 21.1 KG/M2

## 2024-10-21 DIAGNOSIS — Z01.810 PREOPERATIVE CARDIOVASCULAR EXAMINATION: Primary | ICD-10-CM

## 2024-10-21 DIAGNOSIS — I42.9 CARDIOMYOPATHY, UNSPECIFIED TYPE (HCC): ICD-10-CM

## 2024-10-21 DIAGNOSIS — I10 ESSENTIAL HYPERTENSION: ICD-10-CM

## 2024-10-21 DIAGNOSIS — E11.42 CONTROLLED TYPE 2 DIABETES MELLITUS WITH DIABETIC POLYNEUROPATHY, WITHOUT LONG-TERM CURRENT USE OF INSULIN (HCC): ICD-10-CM

## 2024-10-21 DIAGNOSIS — H25.9 AGE-RELATED CATARACT OF BOTH EYES, UNSPECIFIED AGE-RELATED CATARACT TYPE: ICD-10-CM

## 2024-10-21 PROCEDURE — G8484 FLU IMMUNIZE NO ADMIN: HCPCS | Performed by: INTERNAL MEDICINE

## 2024-10-21 PROCEDURE — 3074F SYST BP LT 130 MM HG: CPT | Performed by: INTERNAL MEDICINE

## 2024-10-21 PROCEDURE — G8427 DOCREV CUR MEDS BY ELIG CLIN: HCPCS | Performed by: INTERNAL MEDICINE

## 2024-10-21 PROCEDURE — 3079F DIAST BP 80-89 MM HG: CPT | Performed by: INTERNAL MEDICINE

## 2024-10-21 PROCEDURE — 1036F TOBACCO NON-USER: CPT | Performed by: INTERNAL MEDICINE

## 2024-10-21 PROCEDURE — 3044F HG A1C LEVEL LT 7.0%: CPT | Performed by: INTERNAL MEDICINE

## 2024-10-21 PROCEDURE — 99214 OFFICE O/P EST MOD 30 MIN: CPT | Performed by: INTERNAL MEDICINE

## 2024-10-21 PROCEDURE — 1123F ACP DISCUSS/DSCN MKR DOCD: CPT | Performed by: INTERNAL MEDICINE

## 2024-10-21 PROCEDURE — G8420 CALC BMI NORM PARAMETERS: HCPCS | Performed by: INTERNAL MEDICINE

## 2024-10-21 NOTE — PROGRESS NOTES
Leonides Appiah is a 84 y.o. male     Chief Complaint   Patient presents with    Pre-op Exam     Pre-op cataract       /80 (Site: Left Upper Arm, Position: Sitting, Cuff Size: Large Adult)   Pulse 60   Temp 97.9 °F (36.6 °C) (Oral)   Resp 16   Ht 1.499 m (4' 11\")   Wt 48.8 kg (107 lb 8 oz)   SpO2 97%   BMI 21.71 kg/m²     Health Maintenance Due   Topic Date Due    DTaP/Tdap/Td vaccine (1 - Tdap) Never done    Respiratory Syncytial Virus (RSV) Pregnant or age 60 yrs+ (1 - 1-dose 60+ series) Never done    Pneumococcal 65+ years Vaccine (2 of 2 - PPSV23 or PCV20) 11/09/2015    Annual Wellness Visit (Medicare)  09/26/2024         \"Have you been to the ER, urgent care clinic since your last visit?  Hospitalized since your last visit?\"    NO    “Have you seen or consulted any other health care providers outside of [facility] since your last visit?”    NO

## 2024-10-26 DIAGNOSIS — E78.2 MIXED HYPERLIPIDEMIA: ICD-10-CM

## 2024-10-26 DIAGNOSIS — I10 ESSENTIAL HYPERTENSION: ICD-10-CM

## 2024-10-28 DIAGNOSIS — E78.2 MIXED HYPERLIPIDEMIA: ICD-10-CM

## 2024-10-28 DIAGNOSIS — I10 ESSENTIAL HYPERTENSION: ICD-10-CM

## 2024-10-28 RX ORDER — AMLODIPINE BESYLATE 5 MG/1
TABLET ORAL
Qty: 90 TABLET | Refills: 0 | Status: SHIPPED | OUTPATIENT
Start: 2024-10-28

## 2024-10-28 RX ORDER — AMLODIPINE BESYLATE 5 MG/1
5 TABLET ORAL DAILY
Qty: 90 TABLET | Refills: 3 | Status: SHIPPED | OUTPATIENT
Start: 2024-10-28

## 2024-10-28 RX ORDER — ATORVASTATIN CALCIUM 40 MG/1
40 TABLET, FILM COATED ORAL DAILY
Qty: 90 TABLET | Refills: 0 | Status: SHIPPED | OUTPATIENT
Start: 2024-10-28

## 2024-10-28 RX ORDER — ATORVASTATIN CALCIUM 40 MG/1
40 TABLET, FILM COATED ORAL DAILY
Qty: 90 TABLET | Refills: 3 | Status: SHIPPED | OUTPATIENT
Start: 2024-10-28

## 2024-10-28 NOTE — TELEPHONE ENCOUNTER
RX refill request from the patient/pharmacy. Patient last seen 10- with labs, and next appt. scheduled for 11-  Requested Prescriptions     Pending Prescriptions Disp Refills    amLODIPine (NORVASC) 5 MG tablet 90 tablet 3     Sig: Take 1 tablet by mouth daily    atorvastatin (LIPITOR) 40 MG tablet 90 tablet 3     Sig: Take 1 tablet by mouth daily    .

## 2024-10-28 NOTE — TELEPHONE ENCOUNTER
PCP: Fredi Santizo MD    Last appt: Visit date not found    Future Appointments   Date Time Provider Department Center   11/6/2024  9:20 AM Fredi Santizo MD [facility] DEP       Requested Prescriptions     Pending Prescriptions Disp Refills    atorvastatin (LIPITOR) 40 MG tablet [Pharmacy Med Name: ATORVASTATIN 40 MG TABLET] 90 tablet 0     Sig: TAKE 1 TABLET BY MOUTH EVERY DAY    amLODIPine (NORVASC) 5 MG tablet [Pharmacy Med Name: AMLODIPINE BESYLATE 5 MG TAB] 90 tablet 0     Sig: TAKE 1 TABLET BY MOUTH EVERY DAY

## 2024-10-30 RX ORDER — METOPROLOL SUCCINATE 25 MG/1
25 TABLET, EXTENDED RELEASE ORAL DAILY
Qty: 90 TABLET | Refills: 3 | Status: SHIPPED | OUTPATIENT
Start: 2024-10-30

## 2024-10-30 NOTE — TELEPHONE ENCOUNTER
Pharmacy: [address]      metoprolol succinate (TOPROL XL) 25 MG extended release tablet [[id number]]    Order Details  Dose: 25 mg Route: Oral Frequency: DAILY   Dispense Quantity: 90 tablet Refills: 3          Sig: TAKE 1 TABLET BY MOUTH EVERY DAY

## 2024-10-30 NOTE — TELEPHONE ENCOUNTER
RX refill request from the patient/pharmacy. Patient last seen 10- with labs, and next appt. scheduled for 11-  Requested Prescriptions     Pending Prescriptions Disp Refills    metoprolol succinate (TOPROL XL) 25 MG extended release tablet 90 tablet 3     Sig: Take 1 tablet by mouth daily    .

## 2024-11-06 ENCOUNTER — OFFICE VISIT (OUTPATIENT)
Facility: CLINIC | Age: 84
End: 2024-11-06

## 2024-11-06 VITALS
WEIGHT: 106.8 LBS | BODY MASS INDEX: 20.97 KG/M2 | HEIGHT: 60 IN | TEMPERATURE: 98 F | DIASTOLIC BLOOD PRESSURE: 80 MMHG | HEART RATE: 57 BPM | RESPIRATION RATE: 16 BRPM | SYSTOLIC BLOOD PRESSURE: 110 MMHG | OXYGEN SATURATION: 98 %

## 2024-11-06 DIAGNOSIS — Z00.00 MEDICARE ANNUAL WELLNESS VISIT, SUBSEQUENT: ICD-10-CM

## 2024-11-06 DIAGNOSIS — Z13.39 ALCOHOL SCREENING: ICD-10-CM

## 2024-11-06 DIAGNOSIS — E11.42 CONTROLLED TYPE 2 DIABETES MELLITUS WITH DIABETIC POLYNEUROPATHY, WITHOUT LONG-TERM CURRENT USE OF INSULIN (HCC): ICD-10-CM

## 2024-11-06 DIAGNOSIS — K57.90 DIVERTICULOSIS: ICD-10-CM

## 2024-11-06 DIAGNOSIS — M15.0 PRIMARY OSTEOARTHRITIS INVOLVING MULTIPLE JOINTS: ICD-10-CM

## 2024-11-06 DIAGNOSIS — E78.2 MIXED HYPERLIPIDEMIA: ICD-10-CM

## 2024-11-06 DIAGNOSIS — I10 ESSENTIAL HYPERTENSION: Primary | ICD-10-CM

## 2024-11-06 DIAGNOSIS — I25.10 ASCVD (ARTERIOSCLEROTIC CARDIOVASCULAR DISEASE): ICD-10-CM

## 2024-11-06 DIAGNOSIS — Z12.5 PROSTATE CANCER SCREENING: ICD-10-CM

## 2024-11-06 DIAGNOSIS — G62.9 NEUROPATHY: ICD-10-CM

## 2024-11-06 DIAGNOSIS — E55.9 VITAMIN D DEFICIENCY: ICD-10-CM

## 2024-11-06 DIAGNOSIS — F41.9 ANXIETY: ICD-10-CM

## 2024-11-06 DIAGNOSIS — I42.9 CARDIOMYOPATHY, UNSPECIFIED TYPE (HCC): ICD-10-CM

## 2024-11-06 DIAGNOSIS — R00.1 BRADYCARDIA: ICD-10-CM

## 2024-11-06 ASSESSMENT — PATIENT HEALTH QUESTIONNAIRE - PHQ9
SUM OF ALL RESPONSES TO PHQ QUESTIONS 1-9: 0
SUM OF ALL RESPONSES TO PHQ9 QUESTIONS 1 & 2: 0
1. LITTLE INTEREST OR PLEASURE IN DOING THINGS: NOT AT ALL
SUM OF ALL RESPONSES TO PHQ QUESTIONS 1-9: 0
2. FEELING DOWN, DEPRESSED OR HOPELESS: NOT AT ALL

## 2024-11-06 NOTE — PROGRESS NOTES
Leonides Appiah is a 84 y.o. male     Chief Complaint   Patient presents with    Medicare AWV       /80 (Site: Left Upper Arm, Position: Sitting, Cuff Size: Large Adult)   Pulse 57   Temp 98 °F (36.7 °C) (Oral)   Resp 16   Ht 1.499 m (4' 11\")   Wt 48.4 kg (106 lb 12.8 oz)   SpO2 98%   BMI 21.57 kg/m²     Health Maintenance Due   Topic Date Due    DTaP/Tdap/Td vaccine (1 - Tdap) Never done    Respiratory Syncytial Virus (RSV) Pregnant or age 60 yrs+ (1 - 1-dose 60+ series) Never done    Pneumococcal 65+ years Vaccine (2 of 2 - PPSV23 or PCV20) 11/09/2015    Annual Wellness Visit (Medicare)  09/26/2024         \"Have you been to the ER, urgent care clinic since your last visit?  Hospitalized since your last visit?\"    NO    “Have you seen or consulted any other health care providers outside of [facility] since your last visit?”    NO                    
   metoprolol succinate (TOPROL XL) 25 MG extended release tablet Take 1 tablet by mouth daily 90 tablet 3    amLODIPine (NORVASC) 5 MG tablet TAKE 1 TABLET BY MOUTH EVERY DAY 90 tablet 0    atorvastatin (LIPITOR) 40 MG tablet Take 1 tablet by mouth daily 90 tablet 3    Coenzyme Q10 (COQ-10) 100 MG CAPS Take 1 capsule by mouth daily      ramipril (ALTACE) 10 MG capsule TAKE 2 CAPSULES BY MOUTH EVERY  capsule 3    bumetanide (BUMEX) 0.5 MG tablet TAKE 1 TABLET BY MOUTH EVERY DAY 90 tablet 3    metFORMIN (GLUCOPHAGE) 500 MG tablet TAKE 1 TABLET BY MOUTH TWICE A DAY WITH MEALS (Patient taking differently: Take 1 tablet by mouth daily (with breakfast)) 180 tablet 3    polyethylene glycol (GLYCOLAX) 17 GM/SCOOP powder       SODIUM FLUORIDE, DENTAL GEL, 1.1 % GEL BRUSH WITH A PEA SIZED AMOUNT FOR 2 MINUTES BEFORE BEDTIME. DO NOT RINSE/DRINK/EAT AFTER BRUSHING.       No current facility-administered medications for this visit.       No results found for any visits on 11/06/24.    Verbal and written instructions (see AVS) provided.  Patient expresses understanding of diagnosis and treatment plan.    Fredi Santizo MD

## 2024-11-07 ENCOUNTER — TELEPHONE (OUTPATIENT)
Facility: CLINIC | Age: 84
End: 2024-11-07

## 2024-11-07 LAB
25(OH)D3 SERPL-MCNC: 37.1 NG/ML (ref 30–100)
ALBUMIN SERPL-MCNC: 4.1 G/DL (ref 3.5–5)
ALBUMIN/GLOB SERPL: 1.2 (ref 1.1–2.2)
ALP SERPL-CCNC: 79 U/L (ref 45–117)
ALT SERPL-CCNC: 21 U/L (ref 12–78)
ANION GAP SERPL CALC-SCNC: 4 MMOL/L (ref 2–12)
APPEARANCE UR: CLEAR
AST SERPL-CCNC: 22 U/L (ref 15–37)
BACTERIA URNS QL MICRO: ABNORMAL /HPF
BASOPHILS # BLD: 0 K/UL (ref 0–0.1)
BASOPHILS NFR BLD: 0 % (ref 0–1)
BILIRUB SERPL-MCNC: 1.1 MG/DL (ref 0.2–1)
BILIRUB UR QL: NEGATIVE
BUN SERPL-MCNC: 21 MG/DL (ref 6–20)
BUN/CREAT SERPL: 22 (ref 12–20)
CALCIUM SERPL-MCNC: 10.6 MG/DL (ref 8.5–10.1)
CHLORIDE SERPL-SCNC: 106 MMOL/L (ref 97–108)
CHOLEST SERPL-MCNC: 165 MG/DL
CK SERPL-CCNC: 79 U/L (ref 39–308)
CO2 SERPL-SCNC: 31 MMOL/L (ref 21–32)
COLOR UR: ABNORMAL
CREAT SERPL-MCNC: 0.97 MG/DL (ref 0.7–1.3)
CREAT UR-MCNC: 138 MG/DL
DIFFERENTIAL METHOD BLD: ABNORMAL
EOSINOPHIL # BLD: 0.1 K/UL (ref 0–0.4)
EOSINOPHIL NFR BLD: 1 % (ref 0–7)
EPITH CASTS URNS QL MICRO: ABNORMAL /LPF
ERYTHROCYTE [DISTWIDTH] IN BLOOD BY AUTOMATED COUNT: 14.5 % (ref 11.5–14.5)
EST. AVERAGE GLUCOSE BLD GHB EST-MCNC: 117 MG/DL
GLOBULIN SER CALC-MCNC: 3.4 G/DL (ref 2–4)
GLUCOSE SERPL-MCNC: 91 MG/DL (ref 65–100)
GLUCOSE UR STRIP.AUTO-MCNC: NEGATIVE MG/DL
HBA1C MFR BLD: 5.7 % (ref 4–5.6)
HCT VFR BLD AUTO: 47.8 % (ref 36.6–50.3)
HDLC SERPL-MCNC: 62 MG/DL
HDLC SERPL: 2.7 (ref 0–5)
HGB BLD-MCNC: 15.7 G/DL (ref 12.1–17)
HGB UR QL STRIP: NEGATIVE
IMM GRANULOCYTES # BLD AUTO: 0 K/UL (ref 0–0.04)
IMM GRANULOCYTES NFR BLD AUTO: 0 % (ref 0–0.5)
KETONES UR QL STRIP.AUTO: NEGATIVE MG/DL
LDLC SERPL CALC-MCNC: 83.2 MG/DL (ref 0–100)
LEUKOCYTE ESTERASE UR QL STRIP.AUTO: ABNORMAL
LYMPHOCYTES # BLD: 2.5 K/UL (ref 0.8–3.5)
LYMPHOCYTES NFR BLD: 33 % (ref 12–49)
MCH RBC QN AUTO: 32.9 PG (ref 26–34)
MCHC RBC AUTO-ENTMCNC: 32.8 G/DL (ref 30–36.5)
MCV RBC AUTO: 100.2 FL (ref 80–99)
MICROALBUMIN UR-MCNC: 5.97 MG/DL
MICROALBUMIN/CREAT UR-RTO: 43 MG/G (ref 0–30)
MONOCYTES # BLD: 0.7 K/UL (ref 0–1)
MONOCYTES NFR BLD: 10 % (ref 5–13)
NEUTS SEG # BLD: 4.2 K/UL (ref 1.8–8)
NEUTS SEG NFR BLD: 56 % (ref 32–75)
NITRITE UR QL STRIP.AUTO: NEGATIVE
NRBC # BLD: 0 K/UL (ref 0–0.01)
NRBC BLD-RTO: 0 PER 100 WBC
PH UR STRIP: 7 (ref 5–8)
PLATELET # BLD AUTO: 213 K/UL (ref 150–400)
PMV BLD AUTO: 10.8 FL (ref 8.9–12.9)
POTASSIUM SERPL-SCNC: 4.6 MMOL/L (ref 3.5–5.1)
PROT SERPL-MCNC: 7.5 G/DL (ref 6.4–8.2)
PROT UR STRIP-MCNC: ABNORMAL MG/DL
PSA SERPL-MCNC: 1.1 NG/ML (ref 0.01–4)
RBC # BLD AUTO: 4.77 M/UL (ref 4.1–5.7)
RBC #/AREA URNS HPF: ABNORMAL /HPF (ref 0–5)
SODIUM SERPL-SCNC: 141 MMOL/L (ref 136–145)
SP GR UR REFRACTOMETRY: 1.02 (ref 1–1.03)
T4 FREE SERPL-MCNC: 1.2 NG/DL (ref 0.8–1.5)
TRIGL SERPL-MCNC: 99 MG/DL
TSH SERPL DL<=0.05 MIU/L-ACNC: 1.27 UIU/ML (ref 0.36–3.74)
UROBILINOGEN UR QL STRIP.AUTO: 1 EU/DL (ref 0.2–1)
VLDLC SERPL CALC-MCNC: 19.8 MG/DL
WBC # BLD AUTO: 7.6 K/UL (ref 4.1–11.1)
WBC URNS QL MICRO: ABNORMAL /HPF (ref 0–4)

## 2024-11-07 NOTE — TELEPHONE ENCOUNTER
Patient called in hoping to verify if he is supposed to be taking three different medications as they aren't on his medication list. States that he believes he should be taking two different vitamins cardiomega-epa and provex-cv. Also believes he is supposed to be taking aspirin but it is not on his list as well.    Please advise.

## 2024-11-19 PROBLEM — Z01.810 PREOPERATIVE CARDIOVASCULAR EXAMINATION: Status: RESOLVED | Noted: 2024-10-20 | Resolved: 2024-11-19

## 2024-12-05 PROBLEM — Z00.00 MEDICARE ANNUAL WELLNESS VISIT, SUBSEQUENT: Status: RESOLVED | Noted: 2017-08-07 | Resolved: 2024-12-05

## 2024-12-05 PROBLEM — Z12.5 PROSTATE CANCER SCREENING: Status: RESOLVED | Noted: 2021-09-14 | Resolved: 2024-12-05

## 2025-01-24 NOTE — TELEPHONE ENCOUNTER
RX refill request from the patient/pharmacy. Patient last seen 11- with labs, and next appt. scheduled for 02-  Requested Prescriptions     Pending Prescriptions Disp Refills    metFORMIN (GLUCOPHAGE) 500 MG tablet [Pharmacy Med Name: METFORMIN  MG TABLET] 180 tablet 3     Sig: TAKE 1 TABLET BY MOUTH TWICE A DAY WITH FOOD    .

## 2025-01-27 ENCOUNTER — TELEPHONE (OUTPATIENT)
Facility: CLINIC | Age: 85
End: 2025-01-27

## 2025-01-27 NOTE — TELEPHONE ENCOUNTER
amLODIPine (NORVASC) 5 MG tablet 90 tablet 0 10/28/2024 --    Sig: TAKE 1 TABLET BY MOUTH EVERY DAY        Last visit 11/6/24  Future appt 2/6/25  Pharmacy [facility]

## 2025-01-28 DIAGNOSIS — E78.2 MIXED HYPERLIPIDEMIA: ICD-10-CM

## 2025-01-28 RX ORDER — AMLODIPINE BESYLATE 5 MG/1
5 TABLET ORAL DAILY
Qty: 90 TABLET | Refills: 3 | Status: SHIPPED | OUTPATIENT
Start: 2025-01-28

## 2025-01-28 NOTE — TELEPHONE ENCOUNTER
RX refill request from the patient/pharmacy. Patient last seen 11- with labs, and next appt. scheduled for 02-  Requested Prescriptions     Pending Prescriptions Disp Refills    amLODIPine (NORVASC) 5 MG tablet 90 tablet 3     Sig: Take 1 tablet by mouth daily    .

## 2025-02-06 ENCOUNTER — OFFICE VISIT (OUTPATIENT)
Facility: CLINIC | Age: 85
End: 2025-02-06

## 2025-02-06 VITALS
SYSTOLIC BLOOD PRESSURE: 110 MMHG | HEIGHT: 60 IN | RESPIRATION RATE: 16 BRPM | OXYGEN SATURATION: 98 % | BODY MASS INDEX: 20.89 KG/M2 | WEIGHT: 106.4 LBS | TEMPERATURE: 97.5 F | DIASTOLIC BLOOD PRESSURE: 60 MMHG | HEART RATE: 60 BPM

## 2025-02-06 DIAGNOSIS — I25.10 ASCVD (ARTERIOSCLEROTIC CARDIOVASCULAR DISEASE): ICD-10-CM

## 2025-02-06 DIAGNOSIS — E78.2 MIXED HYPERLIPIDEMIA: ICD-10-CM

## 2025-02-06 DIAGNOSIS — I10 ESSENTIAL HYPERTENSION: Primary | ICD-10-CM

## 2025-02-06 DIAGNOSIS — I42.9 CARDIOMYOPATHY, UNSPECIFIED TYPE (HCC): ICD-10-CM

## 2025-02-06 DIAGNOSIS — M15.0 PRIMARY OSTEOARTHRITIS INVOLVING MULTIPLE JOINTS: ICD-10-CM

## 2025-02-06 DIAGNOSIS — E11.42 CONTROLLED TYPE 2 DIABETES MELLITUS WITH DIABETIC POLYNEUROPATHY, WITHOUT LONG-TERM CURRENT USE OF INSULIN (HCC): ICD-10-CM

## 2025-02-06 DIAGNOSIS — G62.9 NEUROPATHY: ICD-10-CM

## 2025-02-06 LAB
ALBUMIN SERPL-MCNC: 4 G/DL (ref 3.5–5)
ALBUMIN/GLOB SERPL: 1.3 (ref 1.1–2.2)
ALP SERPL-CCNC: 74 U/L (ref 45–117)
ALT SERPL-CCNC: 17 U/L (ref 12–78)
ANION GAP SERPL CALC-SCNC: 4 MMOL/L (ref 2–12)
AST SERPL-CCNC: 24 U/L (ref 15–37)
BILIRUB SERPL-MCNC: 0.9 MG/DL (ref 0.2–1)
BUN SERPL-MCNC: 18 MG/DL (ref 6–20)
BUN/CREAT SERPL: 21 (ref 12–20)
CALCIUM SERPL-MCNC: 10.1 MG/DL (ref 8.5–10.1)
CHLORIDE SERPL-SCNC: 104 MMOL/L (ref 97–108)
CHOLEST SERPL-MCNC: 149 MG/DL
CK SERPL-CCNC: 102 U/L (ref 39–308)
CO2 SERPL-SCNC: 32 MMOL/L (ref 21–32)
CREAT SERPL-MCNC: 0.84 MG/DL (ref 0.7–1.3)
EST. AVERAGE GLUCOSE BLD GHB EST-MCNC: 126 MG/DL
GLOBULIN SER CALC-MCNC: 3.2 G/DL (ref 2–4)
GLUCOSE SERPL-MCNC: 87 MG/DL (ref 65–100)
HBA1C MFR BLD: 6 % (ref 4–5.6)
HDLC SERPL-MCNC: 60 MG/DL
HDLC SERPL: 2.5 (ref 0–5)
LDLC SERPL CALC-MCNC: 77.4 MG/DL (ref 0–100)
POTASSIUM SERPL-SCNC: 4.3 MMOL/L (ref 3.5–5.1)
PROT SERPL-MCNC: 7.2 G/DL (ref 6.4–8.2)
SODIUM SERPL-SCNC: 140 MMOL/L (ref 136–145)
TRIGL SERPL-MCNC: 58 MG/DL
VLDLC SERPL CALC-MCNC: 11.6 MG/DL

## 2025-02-06 ASSESSMENT — PATIENT HEALTH QUESTIONNAIRE - PHQ9
SUM OF ALL RESPONSES TO PHQ9 QUESTIONS 1 & 2: 0
SUM OF ALL RESPONSES TO PHQ QUESTIONS 1-9: 0
1. LITTLE INTEREST OR PLEASURE IN DOING THINGS: NOT AT ALL
SUM OF ALL RESPONSES TO PHQ QUESTIONS 1-9: 0
2. FEELING DOWN, DEPRESSED OR HOPELESS: NOT AT ALL

## 2025-02-06 NOTE — PROGRESS NOTES
Chief Complaint   Patient presents with    Diabetes     3 month f/u    Cholesterol Problem    Hypertension       SUBJECTIVE:    Leonides Appiah is a 84 y.o. male who returns in follow-up for his medical problems include hypertension, diabetes, hyperlipidemia, ASCVD, cardiomyopathy, diabetic neuropathy, DJD and other multiple medical problems.  He is taking his medications trying to follow his diet try and remain physically active.  Currently he notes no chest pain, shortness of breath or cardiac respiratory complaints.  He notes no current GI or  complaints.  He notes no headaches, dizziness or neurologic complaints.  He has no new arthritic complaints and there are no other complaints on complete review of systems.      Current Outpatient Medications   Medication Sig Dispense Refill    amLODIPine (NORVASC) 5 MG tablet Take 1 tablet by mouth daily 90 tablet 3    metFORMIN (GLUCOPHAGE) 500 MG tablet TAKE 1 TABLET BY MOUTH TWICE A DAY WITH FOOD 180 tablet 3    metoprolol succinate (TOPROL XL) 25 MG extended release tablet Take 1 tablet by mouth daily 90 tablet 3    atorvastatin (LIPITOR) 40 MG tablet Take 1 tablet by mouth daily 90 tablet 3    Coenzyme Q10 (COQ-10) 100 MG CAPS Take 1 capsule by mouth daily      ramipril (ALTACE) 10 MG capsule TAKE 2 CAPSULES BY MOUTH EVERY  capsule 3    bumetanide (BUMEX) 0.5 MG tablet TAKE 1 TABLET BY MOUTH EVERY DAY 90 tablet 3    polyethylene glycol (GLYCOLAX) 17 GM/SCOOP powder       SODIUM FLUORIDE, DENTAL GEL, 1.1 % GEL BRUSH WITH A PEA SIZED AMOUNT FOR 2 MINUTES BEFORE BEDTIME. DO NOT RINSE/DRINK/EAT AFTER BRUSHING.       No current facility-administered medications for this visit.     Past Medical History:   Diagnosis Date    Anxiety 8/5/2017    Arrhythmia     Arthritis     Atherosclerotic heart disease of native coronary artery without angina pectoris 8/5/2017    Bradycardia 8/5/2017    CAD (coronary artery disease)     Constipation, chronic 8/5/2017    Diabetes

## 2025-02-06 NOTE — PROGRESS NOTES
Leonides Appiah is a 84 y.o. male     Chief Complaint   Patient presents with    Diabetes     3 month f/u    Cholesterol Problem    Hypertension       /60 (Site: Left Upper Arm, Position: Sitting, Cuff Size: Large Adult)   Pulse 60   Temp 97.5 °F (36.4 °C) (Oral)   Resp 16   Ht 1.499 m (4' 11\")   Wt 48.3 kg (106 lb 6.4 oz)   SpO2 98%   BMI 21.49 kg/m²     Health Maintenance Due   Topic Date Due    DTaP/Tdap/Td vaccine (1 - Tdap) Never done    Respiratory Syncytial Virus (RSV) Pregnant or age 60 yrs+ (1 - 1-dose 75+ series) Never done    Pneumococcal 50+ years Vaccine (2 of 2 - PPSV23) 11/09/2015         \"Have you been to the ER, urgent care clinic since your last visit?  Hospitalized since your last visit?\"    NO    “Have you seen or consulted any other health care providers outside of [facility] since your last visit?”    NO

## 2025-03-12 ENCOUNTER — TELEPHONE (OUTPATIENT)
Facility: CLINIC | Age: 85
End: 2025-03-12

## 2025-03-12 NOTE — TELEPHONE ENCOUNTER
Patient called stating he received some information from [facility] advising him about getting the RSV vaccine.  Patient wanted to check with Dr. Santizo first to see if he should receive this vaccine.  Please advise.

## 2025-03-14 NOTE — TELEPHONE ENCOUNTER
Called and spoke with patient to let him know Dr. Santizo  was ok with getting his RSV vaccine.

## 2025-04-25 DIAGNOSIS — I10 ESSENTIAL (PRIMARY) HYPERTENSION: ICD-10-CM

## 2025-04-25 RX ORDER — BUMETANIDE 0.5 MG/1
0.5 TABLET ORAL DAILY
Qty: 90 TABLET | Refills: 1 | Status: SHIPPED | OUTPATIENT
Start: 2025-04-25

## 2025-04-25 RX ORDER — RAMIPRIL 10 MG/1
20 CAPSULE ORAL DAILY
Qty: 180 CAPSULE | Refills: 1 | Status: SHIPPED | OUTPATIENT
Start: 2025-04-25

## 2025-04-25 NOTE — TELEPHONE ENCOUNTER
RX refill request from the patient/pharmacy. Patient last seen 02/06/2025 with labs, and next appt. scheduled for 05/09/2025.   Requested Prescriptions     Pending Prescriptions Disp Refills    bumetanide (BUMEX) 0.5 MG tablet [Pharmacy Med Name: BUMETANIDE 0.5 MG TABLET] 90 tablet 1     Sig: TAKE 1 TABLET BY MOUTH EVERY DAY    ramipril (ALTACE) 10 MG capsule [Pharmacy Med Name: RAMIPRIL 10 MG CAPSULE] 180 capsule 1     Sig: TAKE 2 CAPSULES BY MOUTH EVERY DAY

## 2025-04-30 ENCOUNTER — TELEPHONE (OUTPATIENT)
Facility: CLINIC | Age: 85
End: 2025-04-30

## 2025-04-30 NOTE — TELEPHONE ENCOUNTER
Patient called in requesting an urgent call back regarding his medication, amlodipine. Advises he needs to speak to a nurse as soon as possible as he is unsure if he is supposed to be taking this medication. Please advise.

## 2025-04-30 NOTE — TELEPHONE ENCOUNTER
Called and spoke with pt and advised that he is supposed to take amlodipine. I also called the pharmacy and advised them to refill his medication.

## 2025-05-05 ENCOUNTER — OFFICE VISIT (OUTPATIENT)
Facility: CLINIC | Age: 85
End: 2025-05-05
Payer: MEDICARE

## 2025-05-05 VITALS
DIASTOLIC BLOOD PRESSURE: 72 MMHG | OXYGEN SATURATION: 95 % | TEMPERATURE: 97.8 F | SYSTOLIC BLOOD PRESSURE: 138 MMHG | BODY MASS INDEX: 21.89 KG/M2 | HEART RATE: 60 BPM | WEIGHT: 108.4 LBS

## 2025-05-05 DIAGNOSIS — H60.312 ACUTE DIFFUSE OTITIS EXTERNA OF LEFT EAR: Primary | ICD-10-CM

## 2025-05-05 PROCEDURE — 1159F MED LIST DOCD IN RCRD: CPT | Performed by: INTERNAL MEDICINE

## 2025-05-05 PROCEDURE — 1126F AMNT PAIN NOTED NONE PRSNT: CPT | Performed by: INTERNAL MEDICINE

## 2025-05-05 PROCEDURE — G8427 DOCREV CUR MEDS BY ELIG CLIN: HCPCS | Performed by: INTERNAL MEDICINE

## 2025-05-05 PROCEDURE — G8420 CALC BMI NORM PARAMETERS: HCPCS | Performed by: INTERNAL MEDICINE

## 2025-05-05 PROCEDURE — 99213 OFFICE O/P EST LOW 20 MIN: CPT | Performed by: INTERNAL MEDICINE

## 2025-05-05 PROCEDURE — 3075F SYST BP GE 130 - 139MM HG: CPT | Performed by: INTERNAL MEDICINE

## 2025-05-05 PROCEDURE — 4130F TOPICAL PREP RX AOE: CPT | Performed by: INTERNAL MEDICINE

## 2025-05-05 PROCEDURE — 1036F TOBACCO NON-USER: CPT | Performed by: INTERNAL MEDICINE

## 2025-05-05 PROCEDURE — 1123F ACP DISCUSS/DSCN MKR DOCD: CPT | Performed by: INTERNAL MEDICINE

## 2025-05-05 PROCEDURE — 3078F DIAST BP <80 MM HG: CPT | Performed by: INTERNAL MEDICINE

## 2025-05-05 RX ORDER — OFLOXACIN 3 MG/ML
5 SOLUTION AURICULAR (OTIC) 2 TIMES DAILY
Qty: 5 ML | Refills: 0 | Status: SHIPPED | OUTPATIENT
Start: 2025-05-05 | End: 2025-05-12

## 2025-05-05 SDOH — ECONOMIC STABILITY: FOOD INSECURITY: WITHIN THE PAST 12 MONTHS, THE FOOD YOU BOUGHT JUST DIDN'T LAST AND YOU DIDN'T HAVE MONEY TO GET MORE.: NEVER TRUE

## 2025-05-05 SDOH — ECONOMIC STABILITY: FOOD INSECURITY: WITHIN THE PAST 12 MONTHS, YOU WORRIED THAT YOUR FOOD WOULD RUN OUT BEFORE YOU GOT MONEY TO BUY MORE.: NEVER TRUE

## 2025-05-05 NOTE — PROGRESS NOTES
Subjective:   Leonides Appiah is a 84 y.o. male      Chief Complaint   Patient presents with    Ear Drainage     Pt states that he has blood in his left ear. Pt states that he was at the audiologist and was told that he had blood in his ear. Pt denies any pain.         History of present illness: He presents noting he went to his audiologist today was told he had blood in his left ear.  He also states that audiologist told him something in his ear that he could not tell what it was and he needed to be seen.  He notes no pain in the ear until I did examine at which time he noted ear felt swollen.  He has not used any Q-tips in the ear per his history.    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    Epigastric pain    Cardiomyopathy (HCC)    Controlled type 2 diabetes mellitus with diabetic polyneuropathy, without long-term current use of insulin (HCC)    Constipation, chronic    Anxiety    Neuropathy    Diverticulosis    Essential hypertension    Neck pain    Mixed hyperlipidemia    Alcohol screening    Bradycardia    Primary osteoarthritis involving multiple joints    ED (erectile dysfunction)    Vitamin D deficiency    Gross hematuria    Edema    Age-related cataract of both eyes      Past Medical History:   Diagnosis Date    Anxiety 8/5/2017    Arrhythmia     Arthritis     Atherosclerotic heart disease of native coronary artery without angina pectoris 8/5/2017    Bradycardia 8/5/2017    CAD (coronary artery disease)     Constipation, chronic 8/5/2017    Diabetes (HCC)     diet controlled    Diabetes mellitus (HCC) 8/5/2017    Diverticulosis 8/5/2017    ED (erectile dysfunction) 8/5/2017    GERD (gastroesophageal reflux disease)     Hypertension     Hypogonadism male 8/5/2017    Kidney stone     Neuropathy 8/5/2017    On statin therapy 8/5/2017    Right foot pain 8/5/2017      Allergies   Allergen Reactions    Caffeine      Other reaction(s):

## 2025-05-15 ENCOUNTER — OFFICE VISIT (OUTPATIENT)
Facility: CLINIC | Age: 85
End: 2025-05-15

## 2025-05-15 VITALS
BODY MASS INDEX: 21.01 KG/M2 | RESPIRATION RATE: 18 BRPM | HEIGHT: 60 IN | DIASTOLIC BLOOD PRESSURE: 80 MMHG | SYSTOLIC BLOOD PRESSURE: 122 MMHG | TEMPERATURE: 97.7 F | OXYGEN SATURATION: 98 % | WEIGHT: 107 LBS | HEART RATE: 84 BPM

## 2025-05-15 DIAGNOSIS — H92.20 HEMORRHAGE OF EAR CANAL: ICD-10-CM

## 2025-05-15 DIAGNOSIS — H60.312 ACUTE DIFFUSE OTITIS EXTERNA OF LEFT EAR: Primary | ICD-10-CM

## 2025-05-15 SDOH — ECONOMIC STABILITY: FOOD INSECURITY: WITHIN THE PAST 12 MONTHS, YOU WORRIED THAT YOUR FOOD WOULD RUN OUT BEFORE YOU GOT MONEY TO BUY MORE.: NEVER TRUE

## 2025-05-15 SDOH — ECONOMIC STABILITY: FOOD INSECURITY: WITHIN THE PAST 12 MONTHS, THE FOOD YOU BOUGHT JUST DIDN'T LAST AND YOU DIDN'T HAVE MONEY TO GET MORE.: NEVER TRUE

## 2025-05-15 NOTE — PROGRESS NOTES
The patient identity was confirmed with  and First/Last Name. Medications and Allergies reviewed with patient, as well as any new diagnosis/procedures. SDOH Screening done today.    Chief Complaint   Patient presents with    Medication Check        Vitals:    05/15/25 1144   BP: 122/80   Pulse: 84   Resp: 18   Temp: 97.7 °F (36.5 °C)   SpO2: 98%       Health Maintenance Due   Topic Date Due    DTaP/Tdap/Td vaccine (1 - Tdap) Never done    Respiratory Syncytial Virus (RSV) Pregnant or age 60 yrs+ (1 - 1-dose 75+ series) Never done    Pneumococcal 50+ years Vaccine (2 of 2 - PPSV23) 2015    COVID-19 Vaccine ( season) 2025          \"Have you been to the ER, urgent care clinic since your last visit?  Hospitalized since your last visit?\"    NO    “Have you seen or consulted any other health care providers outside our system since your last visit?”    NO            
noted. Nails appear normal.  PERIPHERAL VASCULAR: normal pulses femoral, PT and DP  NEUROLOGIC: non-focal exam, A & O X 3  PSYCHIATRIC:, appropriate affect     ASSESSMENT:   1. Acute diffuse otitis externa of left ear    2. Hemorrhage of ear canal      Impression  No acute otitis external left ear resolved hemorrhage no evidence of residual problem  Recheck per previous schedule    PLAN:  1. Acute diffuse otitis externa of left ear  2. Hemorrhage of ear canal          ATTENTION:   This medical record was transcribed using an electronic medical records system.  Although proofread, it may and can contain electronic and spelling errors.  Other human spelling and other errors may be present.  Corrections may be executed at a later time.  Please feel free to contact us for any clarifications as needed.      No follow-up provider specified.    No results found for any visits on 05/15/25.    Fredi Santizo MD    The patient verbalized understanding of the problems and plans as explained.

## 2025-05-30 ENCOUNTER — TELEPHONE (OUTPATIENT)
Facility: CLINIC | Age: 85
End: 2025-05-30

## 2025-05-30 NOTE — TELEPHONE ENCOUNTER
Patient would like to have purelax prescribed as needed. He states he can get it cheaper this way. Pharmacy- [facility] (on file)

## 2025-06-02 RX ORDER — POLYETHYLENE GLYCOL 3350 17 G/17G
17 POWDER, FOR SOLUTION ORAL DAILY
Qty: 510 G | Refills: 1 | Status: SHIPPED | OUTPATIENT
Start: 2025-06-02 | End: 2025-08-01

## 2025-06-02 NOTE — TELEPHONE ENCOUNTER
RX refill request from the patient/pharmacy. Patient last seen 05/15/2025 without labs, and next appt. scheduled for 08/18/2025  Requested Prescriptions     Pending Prescriptions Disp Refills    polyethylene glycol (GLYCOLAX) 17 GM/SCOOP powder 510 g 1     Sig: Take 17 g by mouth daily

## 2025-06-03 RX ORDER — POLYETHYLENE GLYCOL 3350 17 G/17G
17 POWDER, FOR SOLUTION ORAL DAILY
Qty: 1530 G | Refills: 1 | Status: SHIPPED | OUTPATIENT
Start: 2025-06-03 | End: 2025-11-30

## 2025-06-09 RX ORDER — OFLOXACIN 3 MG/ML
5 SOLUTION AURICULAR (OTIC) 2 TIMES DAILY
Qty: 5 ML | Refills: 0 | Status: SHIPPED | OUTPATIENT
Start: 2025-06-09 | End: 2025-06-16

## 2025-06-09 NOTE — TELEPHONE ENCOUNTER
PCP: Fredi Santizo MD    Last appt: 5/15/2025    Future Appointments   Date Time Provider Department Center   8/18/2025  1:00 PM Fredi Santizo MD [facility] DE[facility]       Requested Prescriptions     Pending Prescriptions Disp Refills    ofloxacin (FLOXIN) 0.3 % otic solution [Pharmacy Med Name: OFLOXACIN 0.3% EAR DROPS] 5 mL 0     Sig: PLACE 5 DROPS IN EAR(S) 2 TIMES DAILY FOR 7 DAYS   '